# Patient Record
Sex: FEMALE | Race: WHITE | NOT HISPANIC OR LATINO
[De-identification: names, ages, dates, MRNs, and addresses within clinical notes are randomized per-mention and may not be internally consistent; named-entity substitution may affect disease eponyms.]

---

## 2018-05-22 ENCOUNTER — APPOINTMENT (OUTPATIENT)
Dept: INTERNAL MEDICINE | Facility: CLINIC | Age: 38
End: 2018-05-22

## 2018-09-26 ENCOUNTER — RESULT REVIEW (OUTPATIENT)
Age: 38
End: 2018-09-26

## 2018-10-03 ENCOUNTER — LABORATORY RESULT (OUTPATIENT)
Age: 38
End: 2018-10-03

## 2018-10-03 ENCOUNTER — APPOINTMENT (OUTPATIENT)
Dept: INTERNAL MEDICINE | Facility: CLINIC | Age: 38
End: 2018-10-03
Payer: MEDICAID

## 2018-10-03 VITALS
WEIGHT: 153 LBS | HEART RATE: 84 BPM | HEIGHT: 68 IN | BODY MASS INDEX: 23.19 KG/M2 | SYSTOLIC BLOOD PRESSURE: 103 MMHG | DIASTOLIC BLOOD PRESSURE: 71 MMHG | OXYGEN SATURATION: 98 %

## 2018-10-03 DIAGNOSIS — D64.9 ANEMIA, UNSPECIFIED: ICD-10-CM

## 2018-10-03 DIAGNOSIS — F12.90 CANNABIS USE, UNSPECIFIED, UNCOMPLICATED: ICD-10-CM

## 2018-10-03 DIAGNOSIS — T78.40XA ALLERGY, UNSPECIFIED, INITIAL ENCOUNTER: ICD-10-CM

## 2018-10-03 DIAGNOSIS — Z80.7 FAMILY HISTORY OF OTHER MALIGNANT NEOPLASMS OF LYMPHOID, HEMATOPOIETIC AND RELATED TISSUES: ICD-10-CM

## 2018-10-03 DIAGNOSIS — Z87.440 PERSONAL HISTORY OF URINARY (TRACT) INFECTIONS: ICD-10-CM

## 2018-10-03 DIAGNOSIS — Z78.9 OTHER SPECIFIED HEALTH STATUS: ICD-10-CM

## 2018-10-03 PROCEDURE — 36415 COLL VENOUS BLD VENIPUNCTURE: CPT

## 2018-10-03 PROCEDURE — G0444 DEPRESSION SCREEN ANNUAL: CPT

## 2018-10-03 PROCEDURE — 99204 OFFICE O/P NEW MOD 45 MIN: CPT | Mod: 25

## 2018-10-07 LAB
ALBUMIN SERPL ELPH-MCNC: 4.4 G/DL
ALP BLD-CCNC: 57 U/L
ALT SERPL-CCNC: 7 U/L
ANA PAT FLD IF-IMP: ABNORMAL
ANA SER IF-ACNC: ABNORMAL
ANION GAP SERPL CALC-SCNC: 15 MMOL/L
APPEARANCE: CLEAR
AST SERPL-CCNC: 25 U/L
BASOPHILS # BLD AUTO: 0.03 K/UL
BASOPHILS NFR BLD AUTO: 0.5 %
BILIRUB SERPL-MCNC: 0.3 MG/DL
BILIRUBIN URINE: NEGATIVE
BLOOD URINE: NEGATIVE
BUN SERPL-MCNC: 15 MG/DL
C TRACH RRNA SPEC QL NAA+PROBE: NOT DETECTED
CALCIUM SERPL-MCNC: 10.1 MG/DL
CHLORIDE SERPL-SCNC: 100 MMOL/L
CHOLEST SERPL-MCNC: 197 MG/DL
CHOLEST/HDLC SERPL: 1.6 RATIO
CO2 SERPL-SCNC: 25 MMOL/L
COLOR: YELLOW
CREAT SERPL-MCNC: 1.09 MG/DL
CRP SERPL-MCNC: 0.1 MG/DL
EOSINOPHIL # BLD AUTO: 0.18 K/UL
EOSINOPHIL NFR BLD AUTO: 3.1 %
ERYTHROCYTE [SEDIMENTATION RATE] IN BLOOD BY WESTERGREN METHOD: 10 MM/HR
GLUCOSE QUALITATIVE U: NEGATIVE MG/DL
GLUCOSE SERPL-MCNC: 46 MG/DL
HBA1C MFR BLD HPLC: 5.2 %
HBV CORE IGG+IGM SER QL: NONREACTIVE
HBV SURFACE AB SER QL: REACTIVE
HBV SURFACE AG SER QL: NONREACTIVE
HCT VFR BLD CALC: 45.6 %
HCV AB SER QL: NONREACTIVE
HCV S/CO RATIO: 0.24 S/CO
HDLC SERPL-MCNC: 120 MG/DL
HEPATITIS A IGG ANTIBODY: REACTIVE
HGB BLD-MCNC: 14.4 G/DL
HIV1+2 AB SPEC QL IA.RAPID: NONREACTIVE
IMM GRANULOCYTES NFR BLD AUTO: 0.2 %
KETONES URINE: NEGATIVE
LDLC SERPL CALC-MCNC: 70 MG/DL
LEUKOCYTE ESTERASE URINE: ABNORMAL
LYMPHOCYTES # BLD AUTO: 2.49 K/UL
LYMPHOCYTES NFR BLD AUTO: 42.9 %
MAN DIFF?: NORMAL
MCHC RBC-ENTMCNC: 28.1 PG
MCHC RBC-ENTMCNC: 31.6 GM/DL
MCV RBC AUTO: 88.9 FL
MONOCYTES # BLD AUTO: 0.42 K/UL
MONOCYTES NFR BLD AUTO: 7.2 %
N GONORRHOEA RRNA SPEC QL NAA+PROBE: NOT DETECTED
NEUTROPHILS # BLD AUTO: 2.68 K/UL
NEUTROPHILS NFR BLD AUTO: 46.1 %
NITRITE URINE: NEGATIVE
PH URINE: 5.5
PLATELET # BLD AUTO: 304 K/UL
POTASSIUM SERPL-SCNC: 5 MMOL/L
PROT SERPL-MCNC: 8.2 G/DL
PROTEIN URINE: NEGATIVE MG/DL
RBC # BLD: 5.13 M/UL
RBC # FLD: 13.4 %
SODIUM SERPL-SCNC: 140 MMOL/L
SOURCE AMPLIFICATION: NORMAL
SPECIFIC GRAVITY URINE: 1.01
T PALLIDUM AB SER QL IA: NEGATIVE
TRIGL SERPL-MCNC: 37 MG/DL
TSH SERPL-ACNC: 1.42 UIU/ML
UROBILINOGEN URINE: NEGATIVE MG/DL
WBC # FLD AUTO: 5.81 K/UL

## 2018-10-26 ENCOUNTER — APPOINTMENT (OUTPATIENT)
Dept: RHEUMATOLOGY | Facility: CLINIC | Age: 38
End: 2018-10-26

## 2018-12-03 ENCOUNTER — APPOINTMENT (OUTPATIENT)
Dept: RHEUMATOLOGY | Facility: CLINIC | Age: 38
End: 2018-12-03
Payer: MEDICAID

## 2018-12-03 VITALS
DIASTOLIC BLOOD PRESSURE: 70 MMHG | OXYGEN SATURATION: 99 % | HEART RATE: 100 BPM | WEIGHT: 151 LBS | SYSTOLIC BLOOD PRESSURE: 104 MMHG | BODY MASS INDEX: 22.96 KG/M2

## 2018-12-03 PROCEDURE — 36415 COLL VENOUS BLD VENIPUNCTURE: CPT

## 2018-12-03 PROCEDURE — 99205 OFFICE O/P NEW HI 60 MIN: CPT | Mod: GC,25

## 2018-12-12 LAB
APTT IMM NP/PRE NP PPP: NORMAL SEC
APTT INV RATIO PPP: 29 SEC
B2 GLYCOPROT1 IGA SERPL IA-ACNC: <5 SAU
B2 GLYCOPROT1 IGG SER-ACNC: <5 SGU
B2 GLYCOPROT1 IGM SER-ACNC: 13.9 SMU
C3 SERPL-MCNC: 106 MG/DL
C4 SERPL-MCNC: 18 MG/DL
CARDIOLIPIN IGM SER-MCNC: 11.2 MPL
CARDIOLIPIN IGM SER-MCNC: <5 GPL
CONFIRM: 24 SEC
DEPRECATED CARDIOLIPIN IGA SER: <5 APL
DRVVT IMM 1:2 NP PPP: NORMAL
DRVVT SCREEN TO CONFIRM RATIO: 0.89 RATIO
DSDNA AB SER-ACNC: <12 IU/ML
ENA RNP AB SER IA-ACNC: 0.2 AL
ENA SCL70 IGG SER IA-ACNC: <0.2 AL
ENA SM AB SER IA-ACNC: <0.2 AL
ENA SS-A AB SER IA-ACNC: >8 AL
ENA SS-B AB SER IA-ACNC: >8 AL
NPP NORMAL POOLED PLASMA: NORMAL SEC
SCREEN DRVVT: 26.4 SEC
SILICA CLOTTING TIME INTERPRETATION: NORMAL
SILICA CLOTTING TIME S/C: 1.03 RATIO
THYROGLOB AB SERPL-ACNC: <20 IU/ML
THYROPEROXIDASE AB SERPL IA-ACNC: <10 IU/ML
TSH SERPL-ACNC: 1.16 UIU/ML

## 2018-12-31 DIAGNOSIS — F51.04 PSYCHOPHYSIOLOGIC INSOMNIA: ICD-10-CM

## 2019-01-11 ENCOUNTER — APPOINTMENT (OUTPATIENT)
Dept: SURGERY | Facility: CLINIC | Age: 39
End: 2019-01-11

## 2019-01-11 ENCOUNTER — OUTPATIENT (OUTPATIENT)
Dept: OUTPATIENT SERVICES | Facility: HOSPITAL | Age: 39
LOS: 1 days | End: 2019-01-11
Payer: MEDICAID

## 2019-01-11 VITALS
OXYGEN SATURATION: 98 % | SYSTOLIC BLOOD PRESSURE: 107 MMHG | TEMPERATURE: 99 F | DIASTOLIC BLOOD PRESSURE: 70 MMHG | WEIGHT: 156.53 LBS | RESPIRATION RATE: 16 BRPM | HEIGHT: 68 IN | HEART RATE: 78 BPM

## 2019-01-11 DIAGNOSIS — Z90.5 ACQUIRED ABSENCE OF KIDNEY: Chronic | ICD-10-CM

## 2019-01-11 DIAGNOSIS — Z90.49 ACQUIRED ABSENCE OF OTHER SPECIFIED PARTS OF DIGESTIVE TRACT: Chronic | ICD-10-CM

## 2019-01-11 DIAGNOSIS — Z98.89 OTHER SPECIFIED POSTPROCEDURAL STATES: Chronic | ICD-10-CM

## 2019-01-11 DIAGNOSIS — Z01.818 ENCOUNTER FOR OTHER PREPROCEDURAL EXAMINATION: ICD-10-CM

## 2019-01-11 DIAGNOSIS — N80.9 ENDOMETRIOSIS, UNSPECIFIED: ICD-10-CM

## 2019-01-11 DIAGNOSIS — Q63.9 CONGENITAL MALFORMATION OF KIDNEY, UNSPECIFIED: Chronic | ICD-10-CM

## 2019-01-11 DIAGNOSIS — K08.409 PARTIAL LOSS OF TEETH, UNSPECIFIED CAUSE, UNSPECIFIED CLASS: Chronic | ICD-10-CM

## 2019-01-11 LAB
ALBUMIN SERPL ELPH-MCNC: 4.3 G/DL — SIGNIFICANT CHANGE UP (ref 3.3–5)
ALP SERPL-CCNC: 54 U/L — SIGNIFICANT CHANGE UP (ref 40–120)
ALT FLD-CCNC: 11 U/L — SIGNIFICANT CHANGE UP (ref 10–45)
ANION GAP SERPL CALC-SCNC: 13 MMOL/L — SIGNIFICANT CHANGE UP (ref 5–17)
APPEARANCE UR: CLEAR — SIGNIFICANT CHANGE UP
APTT BLD: 28.6 SEC — SIGNIFICANT CHANGE UP (ref 27.5–36.3)
AST SERPL-CCNC: 18 U/L — SIGNIFICANT CHANGE UP (ref 10–40)
BACTERIA # UR AUTO: PRESENT /HPF
BILIRUB SERPL-MCNC: 0.2 MG/DL — SIGNIFICANT CHANGE UP (ref 0.2–1.2)
BILIRUB UR-MCNC: NEGATIVE — SIGNIFICANT CHANGE UP
BLD GP AB SCN SERPL QL: NEGATIVE — SIGNIFICANT CHANGE UP
BUN SERPL-MCNC: 13 MG/DL — SIGNIFICANT CHANGE UP (ref 7–23)
CALCIUM SERPL-MCNC: 9.5 MG/DL — SIGNIFICANT CHANGE UP (ref 8.4–10.5)
CHLORIDE SERPL-SCNC: 101 MMOL/L — SIGNIFICANT CHANGE UP (ref 96–108)
CO2 SERPL-SCNC: 26 MMOL/L — SIGNIFICANT CHANGE UP (ref 22–31)
COLOR SPEC: YELLOW — SIGNIFICANT CHANGE UP
CREAT SERPL-MCNC: 1.07 MG/DL — SIGNIFICANT CHANGE UP (ref 0.5–1.3)
DIFF PNL FLD: ABNORMAL
EPI CELLS # UR: ABNORMAL /HPF (ref 0–5)
GLUCOSE SERPL-MCNC: 74 MG/DL — SIGNIFICANT CHANGE UP (ref 70–99)
GLUCOSE UR QL: NEGATIVE — SIGNIFICANT CHANGE UP
HCG SERPL-ACNC: <.1 MIU/ML — SIGNIFICANT CHANGE UP
HCT VFR BLD CALC: 42.3 % — SIGNIFICANT CHANGE UP (ref 34.5–45)
HGB BLD-MCNC: 13.9 G/DL — SIGNIFICANT CHANGE UP (ref 11.5–15.5)
INR BLD: 0.91 — SIGNIFICANT CHANGE UP (ref 0.88–1.16)
KETONES UR-MCNC: NEGATIVE — SIGNIFICANT CHANGE UP
LEUKOCYTE ESTERASE UR-ACNC: NEGATIVE — SIGNIFICANT CHANGE UP
MCHC RBC-ENTMCNC: 27.1 PG — SIGNIFICANT CHANGE UP (ref 27–34)
MCHC RBC-ENTMCNC: 32.9 G/DL — SIGNIFICANT CHANGE UP (ref 32–36)
MCV RBC AUTO: 82.6 FL — SIGNIFICANT CHANGE UP (ref 80–100)
NITRITE UR-MCNC: NEGATIVE — SIGNIFICANT CHANGE UP
PH UR: 6 — SIGNIFICANT CHANGE UP (ref 5–8)
PLATELET # BLD AUTO: 305 K/UL — SIGNIFICANT CHANGE UP (ref 150–400)
POTASSIUM SERPL-MCNC: 5 MMOL/L — SIGNIFICANT CHANGE UP (ref 3.5–5.3)
POTASSIUM SERPL-SCNC: 5 MMOL/L — SIGNIFICANT CHANGE UP (ref 3.5–5.3)
PROT SERPL-MCNC: 8.2 G/DL — SIGNIFICANT CHANGE UP (ref 6–8.3)
PROT UR-MCNC: NEGATIVE MG/DL — SIGNIFICANT CHANGE UP
PROTHROM AB SERPL-ACNC: 10.2 SEC — SIGNIFICANT CHANGE UP (ref 10–12.9)
RBC # BLD: 5.12 M/UL — SIGNIFICANT CHANGE UP (ref 3.8–5.2)
RBC # FLD: 12.6 % — SIGNIFICANT CHANGE UP (ref 10.3–16.9)
RBC CASTS # UR COMP ASSIST: ABNORMAL /HPF
RH IG SCN BLD-IMP: POSITIVE — SIGNIFICANT CHANGE UP
SODIUM SERPL-SCNC: 140 MMOL/L — SIGNIFICANT CHANGE UP (ref 135–145)
SP GR SPEC: 1.02 — SIGNIFICANT CHANGE UP (ref 1–1.03)
UROBILINOGEN FLD QL: 0.2 E.U./DL — SIGNIFICANT CHANGE UP
WBC # BLD: 6.8 K/UL — SIGNIFICANT CHANGE UP (ref 3.8–10.5)
WBC # FLD AUTO: 6.8 K/UL — SIGNIFICANT CHANGE UP (ref 3.8–10.5)
WBC UR QL: < 5 /HPF — SIGNIFICANT CHANGE UP

## 2019-01-11 PROCEDURE — 93010 ELECTROCARDIOGRAM REPORT: CPT

## 2019-01-11 NOTE — H&P PST ADULT - FAMILY HISTORY
Father  Still living? Yes, Estimated age: 71-80  Family history of hypertension, Age at diagnosis: Age Unknown

## 2019-01-11 NOTE — H&P PST ADULT - NSANTHOSAYNRD_GEN_A_CORE
No. DOMO screening performed.  STOP BANG Legend: 0-2 = LOW Risk; 3-4 = INTERMEDIATE Risk; 5-8 = HIGH Risk

## 2019-01-11 NOTE — ASU PATIENT PROFILE, ADULT - PSH
Congenital renal anomaly  Right kidney corrective procedure (?), ureteral stent placment 2008.  History of arthroscopy of shoulder  Right Labrum Tear Repair-2012  History of nephrectomy, unilateral  right kidney 2010  S/P laparoscopic procedure  for endometriosis affecting bladder/colon/with ovary adhesion to uterus  Status post appendectomy    Status post colon resection    [place] teeth extracted

## 2019-01-11 NOTE — H&P PST ADULT - HISTORY OF PRESENT ILLNESS
38 year old female with history of excessive pain over three years. ultrasound and mri confirmed endometriosis. present for history and physical examination prior to laparoscopic excision of endometriosis. 38 year old female with history of excessive lower abdominal pain over three years. Present for history and physical examination prior to laparoscopic excision of endometriosis. Patient had history of right kidney disease and surgically removed  right kidney 2010, now she remain with only left kidney.

## 2019-01-11 NOTE — H&P PST ADULT - PROBLEM SELECTOR PLAN 1
pending ekg and lab pending ekg Preop testing results reviewed. No further testings/consults requested for the proposed surgery.

## 2019-01-11 NOTE — H&P PST ADULT - PSH
Congenital renal anomaly  Right kidney corrective procedure (?), ureteral stent placment 2008.  History of arthroscopy of shoulder  Right Labrum Tear Repair-2012  History of nephrectomy, unilateral  right kidney 2010  S/P laparoscopic procedure  for endometriosis affecting bladder/colon/with ovary adhesion to uterus  Status post appendectomy    Status post colon resection    [place] teeth extracted

## 2019-01-11 NOTE — ASU PATIENT PROFILE, ADULT - PMH
Amenorrhea    Anxiety    Anxiety associated with depression    Congenital kidney disease  right kidney malformation.  Endometriosis    Febrile neutropenia  ER admission 12/05/16, had follow up with primary care MD.  Insomnia    Migraines    Panic disorder    UPJ stricture, acquired

## 2019-01-12 LAB
CANCER AG125 SERPL-ACNC: 22 U/ML — SIGNIFICANT CHANGE UP
CULTURE RESULTS: NO GROWTH — SIGNIFICANT CHANGE UP
SPECIMEN SOURCE: SIGNIFICANT CHANGE UP

## 2019-01-16 PROBLEM — F41.0 PANIC DISORDER [EPISODIC PAROXYSMAL ANXIETY]: Chronic | Status: ACTIVE | Noted: 2019-01-11

## 2019-01-16 PROBLEM — G47.00 INSOMNIA, UNSPECIFIED: Chronic | Status: ACTIVE | Noted: 2019-01-11

## 2019-01-16 PROBLEM — F41.9 ANXIETY DISORDER, UNSPECIFIED: Chronic | Status: ACTIVE | Noted: 2019-01-11

## 2019-01-17 LAB — ANTI-MULLERIAN HORMONE: 2.42 NG/ML — SIGNIFICANT CHANGE UP

## 2019-01-24 NOTE — ASU PATIENT PROFILE, ADULT - PMH
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  Insomnia    Migraines    Panic disorder    UPJ stricture, acquired

## 2019-01-24 NOTE — ASU PATIENT PROFILE, ADULT - PSH
Congenital renal anomaly  Right kidney corrective procedure (?), ureteral stent placment 2008.  History of appendectomy    History of arthroscopy of shoulder  Right Labrum Tear Repair-2012  History of nephrectomy, unilateral  right kidney 2010  S/P laparoscopic procedure  for endometriosis affecting bladder/colon/with ovary adhesion to uterus  Status post appendectomy    Status post colon resection    [place] teeth extracted

## 2019-01-24 NOTE — ASU PATIENT PROFILE, ADULT - SURGICAL SITE INCISION
Patient would like communication of their results via:        Cell Phone:   Telephone Information:   Mobile [phone]     Okay to leave a message containing results? Yes      no

## 2019-01-25 ENCOUNTER — INPATIENT (INPATIENT)
Facility: HOSPITAL | Age: 39
LOS: 0 days | Discharge: HOME | DRG: 331 | End: 2019-01-26
Attending: OBSTETRICS & GYNECOLOGY | Admitting: OBSTETRICS & GYNECOLOGY
Payer: COMMERCIAL

## 2019-01-25 ENCOUNTER — RESULT REVIEW (OUTPATIENT)
Age: 39
End: 2019-01-25

## 2019-01-25 VITALS
HEART RATE: 79 BPM | SYSTOLIC BLOOD PRESSURE: 110 MMHG | TEMPERATURE: 97 F | DIASTOLIC BLOOD PRESSURE: 59 MMHG | WEIGHT: 153 LBS | HEIGHT: 68 IN | RESPIRATION RATE: 16 BRPM

## 2019-01-25 DIAGNOSIS — N80.3 ENDOMETRIOSIS OF PELVIC PERITONEUM: ICD-10-CM

## 2019-01-25 DIAGNOSIS — Z98.89 OTHER SPECIFIED POSTPROCEDURAL STATES: Chronic | ICD-10-CM

## 2019-01-25 DIAGNOSIS — F41.0 PANIC DISORDER [EPISODIC PAROXYSMAL ANXIETY]: ICD-10-CM

## 2019-01-25 DIAGNOSIS — Z90.5 ACQUIRED ABSENCE OF KIDNEY: ICD-10-CM

## 2019-01-25 DIAGNOSIS — Z90.5 ACQUIRED ABSENCE OF KIDNEY: Chronic | ICD-10-CM

## 2019-01-25 DIAGNOSIS — K08.409 PARTIAL LOSS OF TEETH, UNSPECIFIED CAUSE, UNSPECIFIED CLASS: Chronic | ICD-10-CM

## 2019-01-25 DIAGNOSIS — Z90.49 ACQUIRED ABSENCE OF OTHER SPECIFIED PARTS OF DIGESTIVE TRACT: Chronic | ICD-10-CM

## 2019-01-25 DIAGNOSIS — F41.8 OTHER SPECIFIED ANXIETY DISORDERS: ICD-10-CM

## 2019-01-25 DIAGNOSIS — N80.8 OTHER ENDOMETRIOSIS: ICD-10-CM

## 2019-01-25 DIAGNOSIS — N80.5 ENDOMETRIOSIS OF INTESTINE: ICD-10-CM

## 2019-01-25 DIAGNOSIS — G47.00 INSOMNIA, UNSPECIFIED: ICD-10-CM

## 2019-01-25 DIAGNOSIS — Z88.0 ALLERGY STATUS TO PENICILLIN: ICD-10-CM

## 2019-01-25 DIAGNOSIS — Q63.9 CONGENITAL MALFORMATION OF KIDNEY, UNSPECIFIED: Chronic | ICD-10-CM

## 2019-01-25 RX ORDER — METOCLOPRAMIDE HCL 10 MG
10 TABLET ORAL EVERY 6 HOURS
Qty: 0 | Refills: 0 | Status: DISCONTINUED | OUTPATIENT
Start: 2019-01-25 | End: 2019-01-26

## 2019-01-25 RX ORDER — OXYCODONE HYDROCHLORIDE 5 MG/1
10 TABLET ORAL EVERY 6 HOURS
Qty: 0 | Refills: 0 | Status: DISCONTINUED | OUTPATIENT
Start: 2019-01-25 | End: 2019-01-26

## 2019-01-25 RX ORDER — PHENAZOPYRIDINE HCL 100 MG
100 TABLET ORAL EVERY 8 HOURS
Qty: 0 | Refills: 0 | Status: DISCONTINUED | OUTPATIENT
Start: 2019-01-25 | End: 2019-01-26

## 2019-01-25 RX ORDER — OXYCODONE HYDROCHLORIDE 5 MG/1
5 TABLET ORAL EVERY 4 HOURS
Qty: 0 | Refills: 0 | Status: DISCONTINUED | OUTPATIENT
Start: 2019-01-25 | End: 2019-01-26

## 2019-01-25 RX ORDER — MORPHINE SULFATE 50 MG/1
4 CAPSULE, EXTENDED RELEASE ORAL
Qty: 0 | Refills: 0 | Status: DISCONTINUED | OUTPATIENT
Start: 2019-01-25 | End: 2019-01-25

## 2019-01-25 RX ORDER — SODIUM CHLORIDE 9 MG/ML
1000 INJECTION, SOLUTION INTRAVENOUS
Qty: 0 | Refills: 0 | Status: DISCONTINUED | OUTPATIENT
Start: 2019-01-25 | End: 2019-01-26

## 2019-01-25 RX ORDER — ALPRAZOLAM 0.25 MG
0.5 TABLET ORAL AT BEDTIME
Qty: 0 | Refills: 0 | Status: DISCONTINUED | OUTPATIENT
Start: 2019-01-25 | End: 2019-01-26

## 2019-01-25 RX ORDER — ONDANSETRON 8 MG/1
4 TABLET, FILM COATED ORAL EVERY 4 HOURS
Qty: 0 | Refills: 0 | Status: DISCONTINUED | OUTPATIENT
Start: 2019-01-25 | End: 2019-01-26

## 2019-01-25 RX ORDER — KETOROLAC TROMETHAMINE 30 MG/ML
30 SYRINGE (ML) INJECTION EVERY 6 HOURS
Qty: 0 | Refills: 0 | Status: DISCONTINUED | OUTPATIENT
Start: 2019-01-25 | End: 2019-01-26

## 2019-01-25 RX ORDER — ACETAMINOPHEN 500 MG
975 TABLET ORAL EVERY 8 HOURS
Qty: 0 | Refills: 0 | Status: DISCONTINUED | OUTPATIENT
Start: 2019-01-25 | End: 2019-01-26

## 2019-01-25 RX ADMIN — ONDANSETRON 4 MILLIGRAM(S): 8 TABLET, FILM COATED ORAL at 17:11

## 2019-01-25 RX ADMIN — MORPHINE SULFATE 4 MILLIGRAM(S): 50 CAPSULE, EXTENDED RELEASE ORAL at 17:11

## 2019-01-25 RX ADMIN — Medication 0.5 MILLIGRAM(S): at 22:16

## 2019-01-25 RX ADMIN — ONDANSETRON 4 MILLIGRAM(S): 8 TABLET, FILM COATED ORAL at 22:15

## 2019-01-25 RX ADMIN — Medication 975 MILLIGRAM(S): at 23:49

## 2019-01-25 RX ADMIN — MORPHINE SULFATE 4 MILLIGRAM(S): 50 CAPSULE, EXTENDED RELEASE ORAL at 23:50

## 2019-01-25 RX ADMIN — Medication 30 MILLIGRAM(S): at 23:36

## 2019-01-25 RX ADMIN — Medication 975 MILLIGRAM(S): at 20:02

## 2019-01-25 RX ADMIN — Medication 30 MILLIGRAM(S): at 22:15

## 2019-01-25 RX ADMIN — SODIUM CHLORIDE 125 MILLILITER(S): 9 INJECTION, SOLUTION INTRAVENOUS at 19:27

## 2019-01-25 RX ADMIN — MORPHINE SULFATE 4 MILLIGRAM(S): 50 CAPSULE, EXTENDED RELEASE ORAL at 19:27

## 2019-01-25 RX ADMIN — MORPHINE SULFATE 4 MILLIGRAM(S): 50 CAPSULE, EXTENDED RELEASE ORAL at 20:22

## 2019-01-25 NOTE — H&P ADULT - PSH
Congenital renal anomaly  Right kidney corrective procedure (?), ureteral stent placment 2008.  History of appendectomy    History of arthroscopy of shoulder  Right Labrum Tear Repair-2012  History of nephrectomy, unilateral  right kidney 2010  S/P laparoscopic procedure  for endometriosis affecting bladder/colon/with ovary adhesion to uterus  Status post appendectomy    Status post colon resection    [place] teeth extracted

## 2019-01-25 NOTE — BRIEF OPERATIVE NOTE - PROCEDURE
<<-----Click on this checkbox to enter Procedure Repair of ileum  01/25/2019  resection of nodule on ileum and repair of ileum  Active  CZOTTOLA  Repair of colon  01/25/2019  distal sigmoid nodule resection and repair of colon  Active  CZOTTOLA  Excision of endometriosis of cul-de-sac  01/25/2019    Active  CZOTTOLA  Cystoscopy in female patient  01/25/2019    Active  CZOTTOLA  Resection of right ureter  01/25/2019    Active  CZOTTOLA  Hysteroscopy with dilation and curettage of uterus  01/25/2019    Active  CZOTTOLA  Resection of endometriosis  01/25/2019  Laparoscopic  Active  Sebas Peace

## 2019-01-25 NOTE — PROGRESS NOTE ADULT - SUBJECTIVE AND OBJECTIVE BOX
Team 1 Surgery Post-Op Note, PCN:     Pre-Op Dx: endometriosis  Procedure: Hysteroscopy with dilation and curettage of uterus  Resection of right ureter  Cystoscopy in female patient  Excision of endometriosis of cul-de-sac  Repair of colon  Repair of ileum  Resection of endometriosis    Surgeon: Berry Alexandre Krishnan    Subjective: Postoperatively, she feels well; her pain is well-controlled. No headache, chest pain, dyspnea, nausea, vomiting, calf pain. Complains of mild Herman pain. Has not passed flatus, had a BM or been out of bed.      Vital Signs Last 24 Hrs  T(C): 36.3 (25 Jan 2019 16:25), Max: 36.3 (25 Jan 2019 10:44)  T(F): 97.4 (25 Jan 2019 16:25), Max: 97.4 (25 Jan 2019 10:44)  HR: 66 (25 Jan 2019 17:48) (64 - 79)  BP: 110/65 (25 Jan 2019 17:48) (100/54 - 110/65)  BP(mean): 85 (25 Jan 2019 17:48) (71 - 85)  RR: 25 (25 Jan 2019 17:48) (9 - 25)  SpO2: 100% (25 Jan 2019 17:48) (100% - 100%)    Physical Exam:  General: NAD, resting comfortably in bed  Pulmonary: Nonlabored breathing, no respiratory distress  Abdominal: soft, mild RLQ and LLQ tenderness, nondistended, incisions CDI with Steri-Strips and gauzein place, no monik-incisional erythema or induration  Extremities: WWP, normal strength, no calf tenderness, SCDs in place  Neuro: no focal deficits  Psych: pleasant      LABS:            CAPILLARY BLOOD GLUCOSE            ABO Interpretation: A (01-25 @ 12:27)

## 2019-01-25 NOTE — PROGRESS NOTE ADULT - ASSESSMENT
38y Female POD# 0   s/p L/s endo excision, hysteroscopy D&C, cystoscopy s/p b/l stents, ileum nodule removal and repair, stable and meeting postop milestones appropriately.                                        1. Neuro/Pain:  toradol atc, tylenol atc, oxy prn  2  CV:   VS per routine  3. Pulm: Encourage ISS  4. GI: reg  5. :  Herman in place, TOV in AM with removal of ovarian suspension  6. Heme: AM CBC, BMP  7. ID: --  8. DVT ppx: SCDs  9. Dispo: per attending

## 2019-01-25 NOTE — H&P ADULT - ASSESSMENT
38y G0  with Last Menstrual Period 1/7/19 presenting for scheduled for laparoscopic surgery.  -consent obtained and questions answered  -likely to be admitted until post operative day #1

## 2019-01-25 NOTE — BRIEF OPERATIVE NOTE - OPERATION/FINDINGS
Preop Dx: Endometriosis  Postop Dx: Same as above  Procedure: Laparoscopic Resection of Endometriosis  Findings: Implants noted on distal ileum and rectum. These were excised and the resulting defects closed primarily. The terminal ileum defect closed with 1x simple interrupted suture and the rectal defect closed with 3x interrupted sutures. Prior ileocolonic anastomosis free of disease. Small bowel run proximally and free of disease. The remaining ascending colon, transverse/descending/sigmoid colon free of implants. Diaphragm inspected and free of implants. Further operative details per gyn operative note. Preop Dx: Endometriosis  Postop Dx: Same as above  Procedure: Laparoscopic Resection of Endometriosis  Findings: Implants noted on distal ileum and rectum. These were excised and the resulting defects closed primarily. The terminal ileum defect closed with 1x simple interrupted suture and the rectal defect closed with 3x interrupted sutures. Methylene blue test negative for leak. Prior ileocolonic anastomosis free of disease. Small bowel run proximally and free of disease. The remaining ascending colon, transverse/descending/sigmoid colon free of implants. Diaphragm inspected and free of implants. Further operative details per gyn operative note. Preop Dx: Endometriosis  Postop Dx: Same as above  Procedure: Laparoscopic Resection of Endometriosis  Findings: Implants noted on distal ileum and rectum. These were excised and the resulting defects closed primarily. The terminal ileum defect closed with 1x simple interrupted suture and the rectal defect closed with 4x interrupted sutures. Methylene blue test negative for leak. Prior ileocolonic anastomosis free of disease. Small bowel run proximally and free of disease. The remaining ascending colon, transverse/descending/sigmoid colon free of implants. Diaphragm inspected and free of implants. Methylene blue test of sigmoid repair was good.  Further operative details per gyn operative note. Preop Dx: Endometriosis  Postop Dx: Same as above  Procedure: Laparoscopic Resection of Endometriosis  Findings: Implants noted on distal ileum and distal sigmoid colon.  These were excised and the resulting defects closed primarily. The terminal ileum defect closed with 1x simple interrupted suture and the sigmoid defect closed with 4x interrupted sutures. Methylene blue test negative for leak. Prior ileocolonic anastomosis free of disease. Small bowel run proximally and free of disease. The remaining ascending colon, transverse/descending/sigmoid colon free of implants. Diaphragm inspected and free of implants. Further operative details per gyn operative note.

## 2019-01-25 NOTE — PROGRESS NOTE ADULT - SUBJECTIVE AND OBJECTIVE BOX
GYN POC   Patient seen at bedside.  Pain controlled. Tolerating sips.  No OOB.  Herman in place.    Denies CP, palpitations, SOB, fever, chills, nausea, vomiting.    Vital Signs Last 24 Hrs  T(C): 36.5 (26 Jan 2019 00:57), Max: 36.6 (25 Jan 2019 19:18)  T(F): 97.7 (26 Jan 2019 00:57), Max: 97.9 (25 Jan 2019 19:18)  HR: 68 (26 Jan 2019 00:57) (54 - 79)  BP: 102/61 (26 Jan 2019 00:57) (100/54 - 110/65)  BP(mean): 76 (25 Jan 2019 19:18) (71 - 85)  RR: 18 (26 Jan 2019 00:57) (9 - 25)  SpO2: 99% (26 Jan 2019 00:57) (99% - 100%)    Physical Exam:  Gen: No Acute Distress  Pulm: Clear to auscultation bilaterally  GI: soft, appropriately tender, nondistended, +BS, no rebound, no guarding.  Dressings C/D/I, right ovarian suspension  : Herman in place  Ext: SCDs in place, wnl    I&O's Summary    25 Jan 2019 07:01  -  26 Jan 2019 03:16  --------------------------------------------------------  IN: 1845 mL / OUT: 1175 mL / NET: 670 mL      MEDICATIONS  (STANDING):  acetaminophen   Tablet .. 975 milliGRAM(s) Oral every 8 hours  ALPRAZolam 0.5 milliGRAM(s) Oral at bedtime  ketorolac   Injectable 30 milliGRAM(s) IV Push every 6 hours  phenazopyridine 100 milliGRAM(s) Oral every 8 hours  promethazine IVPB 25 milliGRAM(s) IV Intermittent once    MEDICATIONS  (PRN):  metoclopramide Injectable 10 milliGRAM(s) IV Push every 6 hours PRN Nausea and/or Vomiting  ondansetron Injectable 4 milliGRAM(s) IV Push every 4 hours PRN Postoperative Nausea and/or Vomiting  oxyCODONE    IR 5 milliGRAM(s) Oral every 4 hours PRN Moderate Pain (4 - 6)  oxyCODONE    IR 10 milliGRAM(s) Oral every 6 hours PRN Severe Pain (7 - 10)    Allergies    adhesives (Hives; Rash)  chlorhexidine topical (Rash)  Dilaudid (Unknown)  Lamictal (Other)  penicillin (Hives)  Steri strips:    big blood blisters (Other)    Intolerances

## 2019-01-25 NOTE — PROGRESS NOTE ADULT - ASSESSMENT
38F with prior endometrioma resection that included a right ileocolic resection (4/8/2016) here with recurrent pelvic pain, now s/p laparoscopic endometrioma resection (1/25). General surgery excised endometrioma from terminal ileum and rectum. Both resulting defects closed with interrupted sutures    Care per gyn primary.   Agree with regular diet.  Team 1C will continue to follow.  Case discussed with chief resident. 38F with prior endometrioma resection that included a right ileocolic resection (4/8/2016) here with recurrent pelvic pain, now s/p laparoscopic endometrioma resection (1/25). General surgery excised endometrioma from terminal ileum and rectum. Both resulting defects closed with interrupted sutures    Care per gyn primary.   Agree with regular diet.  Please encourage IS use.  Team 1C will continue to follow.  Case discussed with chief resident.

## 2019-01-25 NOTE — BRIEF OPERATIVE NOTE - PROCEDURE
<<-----Click on this checkbox to enter Procedure Resection of endometriosis  01/25/2019  Laparoscopic  Active  [id number]

## 2019-01-25 NOTE — BRIEF OPERATIVE NOTE - OPERATION/FINDINGS
Hysteroscopy and dilation and curettage. Cystoscopy and bilateral ureteral stent placement by Dr. Morris. Laparoscopic endometriosis excisions. Dr. Alexandre resected nodule from ileum and distal sigmoid. Repaired resection sites of ileum and sigmoid. Endometriosis excisions of vesicovaginal region. Oversew bladder wall at resection site. Right ureterolysis and removal of right ureter to pelvic brim. Removal of right ureter (hx of right nephrectomy) and right stent.  Removed left ureteral stent. Right ovarian suspension and butler catheter left in place.  EBL <20cc

## 2019-01-26 ENCOUNTER — TRANSCRIPTION ENCOUNTER (OUTPATIENT)
Age: 39
End: 2019-01-26

## 2019-01-26 VITALS
OXYGEN SATURATION: 99 % | HEART RATE: 71 BPM | DIASTOLIC BLOOD PRESSURE: 70 MMHG | SYSTOLIC BLOOD PRESSURE: 107 MMHG | RESPIRATION RATE: 16 BRPM | TEMPERATURE: 98 F

## 2019-01-26 LAB
ANION GAP SERPL CALC-SCNC: 8 MMOL/L — SIGNIFICANT CHANGE UP (ref 5–17)
BASOPHILS NFR BLD AUTO: 0.1 % — SIGNIFICANT CHANGE UP (ref 0–2)
BUN SERPL-MCNC: 14 MG/DL — SIGNIFICANT CHANGE UP (ref 7–23)
CALCIUM SERPL-MCNC: 8.8 MG/DL — SIGNIFICANT CHANGE UP (ref 8.4–10.5)
CHLORIDE SERPL-SCNC: 105 MMOL/L — SIGNIFICANT CHANGE UP (ref 96–108)
CO2 SERPL-SCNC: 23 MMOL/L — SIGNIFICANT CHANGE UP (ref 22–31)
CREAT SERPL-MCNC: 1.12 MG/DL — SIGNIFICANT CHANGE UP (ref 0.5–1.3)
EOSINOPHIL NFR BLD AUTO: 0 % — SIGNIFICANT CHANGE UP (ref 0–6)
GLUCOSE SERPL-MCNC: 150 MG/DL — HIGH (ref 70–99)
HCT VFR BLD CALC: 35.9 % — SIGNIFICANT CHANGE UP (ref 34.5–45)
HGB BLD-MCNC: 11.9 G/DL — SIGNIFICANT CHANGE UP (ref 11.5–15.5)
LYMPHOCYTES # BLD AUTO: 12.8 % — LOW (ref 13–44)
MCHC RBC-ENTMCNC: 26.4 PG — LOW (ref 27–34)
MCHC RBC-ENTMCNC: 33.1 G/DL — SIGNIFICANT CHANGE UP (ref 32–36)
MCV RBC AUTO: 79.8 FL — LOW (ref 80–100)
MONOCYTES NFR BLD AUTO: 7.5 % — SIGNIFICANT CHANGE UP (ref 2–14)
NEUTROPHILS NFR BLD AUTO: 79.6 % — HIGH (ref 43–77)
PLATELET # BLD AUTO: 269 K/UL — SIGNIFICANT CHANGE UP (ref 150–400)
POTASSIUM SERPL-MCNC: 4.2 MMOL/L — SIGNIFICANT CHANGE UP (ref 3.5–5.3)
POTASSIUM SERPL-SCNC: 4.2 MMOL/L — SIGNIFICANT CHANGE UP (ref 3.5–5.3)
RBC # BLD: 4.5 M/UL — SIGNIFICANT CHANGE UP (ref 3.8–5.2)
RBC # FLD: 12.4 % — SIGNIFICANT CHANGE UP (ref 10.3–16.9)
SODIUM SERPL-SCNC: 136 MMOL/L — SIGNIFICANT CHANGE UP (ref 135–145)
WBC # BLD: 10.7 K/UL — HIGH (ref 3.8–10.5)
WBC # FLD AUTO: 10.7 K/UL — HIGH (ref 3.8–10.5)

## 2019-01-26 RX ORDER — MIRTAZAPINE 45 MG/1
2 TABLET, ORALLY DISINTEGRATING ORAL
Qty: 0 | Refills: 0 | COMMUNITY

## 2019-01-26 RX ORDER — SIMETHICONE 80 MG/1
80 TABLET, CHEWABLE ORAL
Qty: 0 | Refills: 0 | Status: DISCONTINUED | OUTPATIENT
Start: 2019-01-26 | End: 2019-01-26

## 2019-01-26 RX ORDER — NARATRIPTAN HYDROCHLORIDE 1 MG/1
1 TABLET, FILM COATED ORAL
Qty: 0 | Refills: 0 | COMMUNITY

## 2019-01-26 RX ADMIN — Medication 975 MILLIGRAM(S): at 07:41

## 2019-01-26 RX ADMIN — Medication 975 MILLIGRAM(S): at 14:45

## 2019-01-26 RX ADMIN — SIMETHICONE 80 MILLIGRAM(S): 80 TABLET, CHEWABLE ORAL at 05:38

## 2019-01-26 RX ADMIN — Medication 100 MILLIGRAM(S): at 14:03

## 2019-01-26 RX ADMIN — Medication 100 MILLIGRAM(S): at 05:38

## 2019-01-26 RX ADMIN — OXYCODONE HYDROCHLORIDE 10 MILLIGRAM(S): 5 TABLET ORAL at 08:20

## 2019-01-26 RX ADMIN — Medication 30 MILLIGRAM(S): at 04:17

## 2019-01-26 RX ADMIN — OXYCODONE HYDROCHLORIDE 5 MILLIGRAM(S): 5 TABLET ORAL at 14:45

## 2019-01-26 RX ADMIN — Medication 975 MILLIGRAM(S): at 05:38

## 2019-01-26 RX ADMIN — Medication 100 MILLIGRAM(S): at 00:44

## 2019-01-26 RX ADMIN — ONDANSETRON 4 MILLIGRAM(S): 8 TABLET, FILM COATED ORAL at 08:02

## 2019-01-26 RX ADMIN — Medication 975 MILLIGRAM(S): at 14:03

## 2019-01-26 RX ADMIN — Medication 30 MILLIGRAM(S): at 09:30

## 2019-01-26 RX ADMIN — Medication 30 MILLIGRAM(S): at 15:20

## 2019-01-26 RX ADMIN — Medication 30 MILLIGRAM(S): at 09:15

## 2019-01-26 RX ADMIN — Medication 30 MILLIGRAM(S): at 15:09

## 2019-01-26 RX ADMIN — OXYCODONE HYDROCHLORIDE 10 MILLIGRAM(S): 5 TABLET ORAL at 07:58

## 2019-01-26 RX ADMIN — OXYCODONE HYDROCHLORIDE 5 MILLIGRAM(S): 5 TABLET ORAL at 14:03

## 2019-01-26 NOTE — PROGRESS NOTE ADULT - SUBJECTIVE AND OBJECTIVE BOX
GYN Progress Note    Patient seen at bedside.  Pain controlled on OPMs, toradol.   Tolerating regular diet. Has not passed flatus.   OOB  s/p butler, has not voided yet.     Denies CP, palpitations, SOB, fever, chills, nausea, vomiting.    Vital Signs Last 24 Hrs  T(C): 36.5 (26 Jan 2019 14:45), Max: 37.4 (26 Jan 2019 08:45)  T(F): 97.7 (26 Jan 2019 14:45), Max: 99.3 (26 Jan 2019 08:45)  HR: 71 (26 Jan 2019 14:45) (54 - 74)  BP: 107/70 (26 Jan 2019 14:45) (100/54 - 110/65)  BP(mean): 76 (25 Jan 2019 19:18) (71 - 85)  RR: 16 (26 Jan 2019 14:45) (9 - 25)  SpO2: 99% (26 Jan 2019 14:45) (99% - 100%)    Physical Exam:  Gen: No Acute Distress  Pulm: Normal work of breathing  GI: soft, appropriately tender, nondistended, +BS, no rebound, no guarding.  Incision C/D/I; ovarian suspensions removed  Ext: SCDs in place, wnl    I&O's Summary    25 Jan 2019 07:01  -  26 Jan 2019 07:00  --------------------------------------------------------  IN: 3145 mL / OUT: 2375 mL / NET: 770 mL    26 Jan 2019 07:01  -  26 Jan 2019 14:53  --------------------------------------------------------  IN: 600 mL / OUT: 200 mL / NET: 400 mL      MEDICATIONS  (STANDING):  acetaminophen   Tablet .. 975 milliGRAM(s) Oral every 8 hours  ALPRAZolam 0.5 milliGRAM(s) Oral at bedtime  ketorolac   Injectable 30 milliGRAM(s) IV Push every 6 hours  phenazopyridine 100 milliGRAM(s) Oral every 8 hours  promethazine IVPB 25 milliGRAM(s) IV Intermittent once    MEDICATIONS  (PRN):  metoclopramide Injectable 10 milliGRAM(s) IV Push every 6 hours PRN Nausea and/or Vomiting  ondansetron Injectable 4 milliGRAM(s) IV Push every 4 hours PRN Postoperative Nausea and/or Vomiting  oxyCODONE    IR 5 milliGRAM(s) Oral every 4 hours PRN Moderate Pain (4 - 6)  oxyCODONE    IR 10 milliGRAM(s) Oral every 6 hours PRN Severe Pain (7 - 10)  simethicone 80 milliGRAM(s) Chew two times a day PRN Gas      LABS:                        11.9   10.7  )-----------( 269      ( 26 Jan 2019 06:48 )             35.9     01-26    136  |  105  |  14  ----------------------------<  150<H>  4.2   |  23  |  1.12    Ca    8.8      26 Jan 2019 06:48

## 2019-01-26 NOTE — PROGRESS NOTE ADULT - REASON FOR ADMISSION
persistent pelvic pain; hx of endometriosis
scheduled laparoscopic surgery

## 2019-01-26 NOTE — PROGRESS NOTE ADULT - ASSESSMENT
POD#1 removal of bowel wall endometrial implants, doing well  1. advance diet as tolerated  2. OOB  3.  discharge planning as per Dr. Smart's team.

## 2019-01-26 NOTE — PROGRESS NOTE ADULT - ASSESSMENT
38y s/p hysteroscopy D&C, cystoscopy s/p b/l stents, laparoscopic endometriosis excision, ileum nodule removal and repair, right retroperitoneal dissection, R ureterectomy POD 1 stable    -ambulate once butler and suspensions removed  -reg diet  -pain control  -intraop findings and post-op instructions d/w patient  -leukocytosis - likely reactive; s/p intraop abx and afebrile  -discharge home today or tomorrow once passing flatus    I have personally seen and examined patient on the above date and sunshine Smart. We agree with findings and plan as detailed per resident note

## 2019-01-26 NOTE — PROGRESS NOTE ADULT - SUBJECTIVE AND OBJECTIVE BOX
Attending Surgeon Progress Note (Covering for Dr. Alexandre)    STATUS POST:  resection/ablation of endometriosis  POST OPERATIVE DAY #: 1    SUBJECTIVE: feels OK, pain controlled  Flatus: Y  Bowel movement: N     PHYSICAL EXAM:  Alert, oriented  Lungs:  CTA bilaterally, good inspiratory effort  Cor:  RRR  Abdomen:  soft, nondistended, incisional tenderness, +bowel sounds, dressings clean dry, intact  Ext:  no edema, well-perfused

## 2019-01-26 NOTE — DISCHARGE NOTE ADULT - PATIENT PORTAL LINK FT
You can access the [facility] Patient Portal, offered by [facility], by registering with the following website: [email or web address]

## 2019-01-26 NOTE — DISCHARGE NOTE ADULT - CARE PLAN
Principal Discharge DX:	Endometriosis  Goal:	healthy recovery  Assessment and plan of treatment:	Please follow up with Dr. Smart in 1-2 weeks. Call your doctor if you have severe, worsening pain, heavy vaginal bleeding, or fevers greater than 100.4 F. It was a pleasure taking care of you.

## 2019-01-26 NOTE — DISCHARGE NOTE ADULT - MEDICATION SUMMARY - MEDICATIONS TO TAKE
I will START or STAY ON the medications listed below when I get home from the hospital:    acetaminophen 325 mg oral tablet  -- 3 tab(s) by mouth every 8 hours  -- Indication: For Pain    Advil 200 mg oral tablet  -- 1 tab(s) by mouth every 6 hours  -- Indication: For Pain    levocetirizine 5 mg oral tablet  -- 1 tab(s) by mouth once a day (in the evening)  -- Indication: For Allergies    Xanax 0.5 mg oral tablet  -- orally once a day  -- Indication: For Anxiety

## 2019-01-26 NOTE — PROGRESS NOTE ADULT - ASSESSMENT
38y Female POD#1 s/p l/s endo excision, D&C, hysteroscoy, cystoscopy with b/l ureteral stents, right ureter resection, ileum nodule removal and repair, distal sigmoid nodule removal and repair, bladder serosal repair                                        1. Neuro/Pain:  OPM, xanax 0.5mg qhs  2  CV:   VS per routine  3. Pulm: Encourage ISS  4. GI: Reg  5. :  f/u TOV  6. Heme: Stable  7. ID: --  8. DVT ppx: SCDs  9. Dispo: Likely POD#1 or 2

## 2019-01-26 NOTE — DISCHARGE NOTE ADULT - PLAN OF CARE
healthy recovery Please follow up with Dr. Smart in 1-2 weeks. Call your doctor if you have severe, worsening pain, heavy vaginal bleeding, or fevers greater than 100.4 F. It was a pleasure taking care of you.

## 2019-01-26 NOTE — PROGRESS NOTE ADULT - SUBJECTIVE AND OBJECTIVE BOX
pain tolerable  wants to ambulate once suspensions, catheter out  tolerating liquids with no n/v  no flatus  no overnight events    VSS  labs reviewed    Abd soft, appropriately TTP, incisions cdi, mild distention

## 2019-01-26 NOTE — PROGRESS NOTE ADULT - ASSESSMENT
38y Female POD#1   s/p L/s endo excision, hysteroscopy D&C, cystoscopy s/p b/l stents, ileum nodule removal and repair, stable and meeting postop milestones appropriately.                                        1. Neuro/Pain:  toradol atc, tylenol atc, oxy prn  2  CV:   VS per routine  3. Pulm: Encourage ISS  4. GI: reg  5. :  Herman in place, TOV at 09:00 with removal of ovarian suspension  6. Heme: AM hemoglobin stable  7. ID: --  8. DVT ppx: SCDs  9. Dispo: per attending

## 2019-01-28 ENCOUNTER — APPOINTMENT (OUTPATIENT)
Dept: PULMONOLOGY | Facility: CLINIC | Age: 39
End: 2019-01-28

## 2019-02-01 LAB — SURGICAL PATHOLOGY STUDY: SIGNIFICANT CHANGE UP

## 2019-02-03 PROBLEM — T78.40XA ALLERGY: Status: ACTIVE | Noted: 2018-10-03

## 2019-02-27 ENCOUNTER — TRANSCRIPTION ENCOUNTER (OUTPATIENT)
Age: 39
End: 2019-02-27

## 2019-03-12 ENCOUNTER — APPOINTMENT (OUTPATIENT)
Dept: RHEUMATOLOGY | Facility: CLINIC | Age: 39
End: 2019-03-12
Payer: MEDICAID

## 2019-03-12 VITALS
OXYGEN SATURATION: 99 % | TEMPERATURE: 98.7 F | BODY MASS INDEX: 22.73 KG/M2 | DIASTOLIC BLOOD PRESSURE: 73 MMHG | WEIGHT: 150 LBS | HEART RATE: 87 BPM | SYSTOLIC BLOOD PRESSURE: 112 MMHG | HEIGHT: 68 IN

## 2019-03-12 PROCEDURE — 36415 COLL VENOUS BLD VENIPUNCTURE: CPT

## 2019-03-12 PROCEDURE — 99215 OFFICE O/P EST HI 40 MIN: CPT | Mod: 25

## 2019-03-13 LAB
ALBUMIN SERPL ELPH-MCNC: 4.1 G/DL
ALP BLD-CCNC: 49 U/L
ALT SERPL-CCNC: 11 U/L
ANION GAP SERPL CALC-SCNC: 13 MMOL/L
AST SERPL-CCNC: 15 U/L
BASOPHILS # BLD AUTO: 0.03 K/UL
BASOPHILS NFR BLD AUTO: 0.5 %
BILIRUB SERPL-MCNC: 0.2 MG/DL
BUN SERPL-MCNC: 10 MG/DL
C3 SERPL-MCNC: 111 MG/DL
C4 SERPL-MCNC: 21 MG/DL
CALCIUM SERPL-MCNC: 9.6 MG/DL
CHLORIDE SERPL-SCNC: 103 MMOL/L
CO2 SERPL-SCNC: 25 MMOL/L
CREAT SERPL-MCNC: 1.05 MG/DL
CRP SERPL-MCNC: <0.1 MG/DL
EOSINOPHIL # BLD AUTO: 0.17 K/UL
EOSINOPHIL NFR BLD AUTO: 2.6 %
ERYTHROCYTE [SEDIMENTATION RATE] IN BLOOD BY WESTERGREN METHOD: 7 MM/HR
GLUCOSE SERPL-MCNC: 85 MG/DL
HCT VFR BLD CALC: 45.1 %
HGB BLD-MCNC: 13.4 G/DL
IGA SER QL IEP: 358 MG/DL
IGG SER QL IEP: 1339 MG/DL
IGM SER QL IEP: 220 MG/DL
IMM GRANULOCYTES NFR BLD AUTO: 0.2 %
LYMPHOCYTES # BLD AUTO: 2.49 K/UL
LYMPHOCYTES NFR BLD AUTO: 38.1 %
MAN DIFF?: NORMAL
MCHC RBC-ENTMCNC: 26.9 PG
MCHC RBC-ENTMCNC: 29.7 GM/DL
MCV RBC AUTO: 90.6 FL
MONOCYTES # BLD AUTO: 0.47 K/UL
MONOCYTES NFR BLD AUTO: 7.2 %
NEUTROPHILS # BLD AUTO: 3.37 K/UL
NEUTROPHILS NFR BLD AUTO: 51.4 %
PLATELET # BLD AUTO: 310 K/UL
POTASSIUM SERPL-SCNC: 4.9 MMOL/L
PROT SERPL-MCNC: 7.2 G/DL
RBC # BLD: 4.98 M/UL
RBC # FLD: 13.2 %
SODIUM SERPL-SCNC: 141 MMOL/L
WBC # FLD AUTO: 6.54 K/UL

## 2019-03-13 NOTE — REASON FOR VISIT
CC:  Emelina Golden is here today for epigastric pain.    Medications: medications verified and updated  Refills needed today?  NO  denies Latex allergy or sensitivity  Patient would like communication of their results via:      Cell Phone:   Telephone Information:   Mobile [phone]     Okay to leave a message containing results? Yes  Health Maintenance   Topic Date Due   • Breast Cancer Screening  12/12/2018   • DTaP/Tdap/Td Vaccine (2 - Td) 01/07/2020   • Cervical Cancer Screening HPV CO-Testing  02/20/2022   • Influenza Vaccine  Completed        [Follow-Up: _____] : a [unfilled] follow-up visit

## 2019-03-13 NOTE — ASSESSMENT
[FreeTextEntry1] : 38 year old presents for follow up evaluation of MAX 1:640. SSa/SSb positive.\par Patient now with dry eyes, diagnosed by ophtho by schirmer test. \par She denies additional sicca symptoms but does note fatigue since her surgery. It is possible that surgery has led to the onset of her autoimmune condition. We discussed her options, including symptomatic treatment for sicca, evaluation with a lip biopsy and plaquenil. She chose to continue symptomatic treatment with eye drops for now. Will continue to monitor for additional symptoms, and consider Plaquenil should her fatigue persist. \par A lip biopsy would ultimately be very helpful to definitively diagnose her, but she is tried from recent surgery/doctors appointments and thus we will hold off for now.

## 2019-03-13 NOTE — REVIEW OF SYSTEMS
[As Noted in HPI] : as noted in HPI [Dizziness] : dizziness [Negative] : Integumentary [Fever] : no fever [Chills] : no chills [Dry Eyes] : no dryness of the eyes [Chest Pain] : no chest pain [Lower Ext Edema] : no lower extremity edema

## 2019-03-13 NOTE — HISTORY OF PRESENT ILLNESS
[___ Month(s) Ago] : [unfilled] month(s) ago [FreeTextEntry1] : Initial Visit: \par Patient seen and examined with Dr. Solorzano. \par Agree with history, physical exam, assessment and plan as described above with exception of the following additions and changes:\par 28 year old presents for evaluation of MAX 1:640. \par Patient with severe endometriosis which likely explains this particularly as she has no other symptoms or signs suggesting an autoimmune condition. \par Will send serologic work up today. \par

## 2019-03-13 NOTE — PHYSICAL EXAM
[General Appearance - Alert] : alert [General Appearance - In No Acute Distress] : in no acute distress [General Appearance - Well Nourished] : well nourished [General Appearance - Well Developed] : well developed [Extraocular Movements] : extraocular movements were intact [Oropharynx] : the oropharynx was normal [Respiration, Rhythm And Depth] : normal respiratory rhythm and effort [Auscultation Breath Sounds / Voice Sounds] : lungs were clear to auscultation bilaterally [Heart Rate And Rhythm] : heart rate was normal and rhythm regular [Heart Sounds] : normal S1 and S2 [Heart Sounds Gallop] : no gallops [Murmurs] : no murmurs [Heart Sounds Pericardial Friction Rub] : no pericardial rub [Edema] : there was no peripheral edema [Abdomen Soft] : soft [Abdomen Tenderness] : non-tender [No Spinal Tenderness] : no spinal tenderness [Abnormal Walk] : normal gait [Musculoskeletal - Swelling] : no joint swelling seen [Motor Tone] : muscle strength and tone were normal [] : no rash [Skin Lesions] : no skin lesions [Cranial Nerves] : cranial nerves 2-12 were intact [No Focal Deficits] : no focal deficits [Outer Ear] : the ears and nose were normal in appearance [Nasal Cavity] : the nasal mucosa and septum were normal [Neck Appearance] : the appearance of the neck was normal

## 2019-04-03 ENCOUNTER — TRANSCRIPTION ENCOUNTER (OUTPATIENT)
Age: 39
End: 2019-04-03

## 2019-04-03 ENCOUNTER — MEDICATION RENEWAL (OUTPATIENT)
Age: 39
End: 2019-04-03

## 2019-07-09 ENCOUNTER — TRANSCRIPTION ENCOUNTER (OUTPATIENT)
Age: 39
End: 2019-07-09

## 2019-07-09 DIAGNOSIS — M75.100 UNSPECIFIED ROTATOR CUFF TEAR OR RUPTURE OF UNSPECIFIED SHOULDER, NOT SPECIFIED AS TRAUMATIC: ICD-10-CM

## 2019-07-10 ENCOUNTER — TRANSCRIPTION ENCOUNTER (OUTPATIENT)
Age: 39
End: 2019-07-10

## 2019-07-10 PROBLEM — M75.100 ROTATOR CUFF TEAR: Status: ACTIVE | Noted: 2019-07-10

## 2019-07-10 PROCEDURE — 36415 COLL VENOUS BLD VENIPUNCTURE: CPT

## 2019-07-10 PROCEDURE — 86901 BLOOD TYPING SEROLOGIC RH(D): CPT

## 2019-07-10 PROCEDURE — 85025 COMPLETE CBC W/AUTO DIFF WBC: CPT

## 2019-07-10 PROCEDURE — 80048 BASIC METABOLIC PNL TOTAL CA: CPT

## 2019-07-10 PROCEDURE — 86900 BLOOD TYPING SEROLOGIC ABO: CPT

## 2019-07-10 PROCEDURE — 88305 TISSUE EXAM BY PATHOLOGIST: CPT

## 2019-07-10 PROCEDURE — 86850 RBC ANTIBODY SCREEN: CPT

## 2019-07-11 ENCOUNTER — TRANSCRIPTION ENCOUNTER (OUTPATIENT)
Age: 39
End: 2019-07-11

## 2019-07-29 ENCOUNTER — APPOINTMENT (OUTPATIENT)
Dept: INTERNAL MEDICINE | Facility: CLINIC | Age: 39
End: 2019-07-29

## 2019-10-10 ENCOUNTER — APPOINTMENT (OUTPATIENT)
Dept: INTERNAL MEDICINE | Facility: CLINIC | Age: 39
End: 2019-10-10
Payer: MEDICAID

## 2019-10-10 ENCOUNTER — NON-APPOINTMENT (OUTPATIENT)
Age: 39
End: 2019-10-10

## 2019-10-10 VITALS
HEART RATE: 87 BPM | SYSTOLIC BLOOD PRESSURE: 103 MMHG | DIASTOLIC BLOOD PRESSURE: 69 MMHG | HEIGHT: 68 IN | BODY MASS INDEX: 23.04 KG/M2 | TEMPERATURE: 98.2 F | OXYGEN SATURATION: 98 % | WEIGHT: 152 LBS

## 2019-10-10 DIAGNOSIS — Z11.3 ENCOUNTER FOR SCREENING FOR INFECTIONS WITH A PREDOMINANTLY SEXUAL MODE OF TRANSMISSION: ICD-10-CM

## 2019-10-10 PROCEDURE — 99214 OFFICE O/P EST MOD 30 MIN: CPT | Mod: 25

## 2019-10-10 PROCEDURE — 93000 ELECTROCARDIOGRAM COMPLETE: CPT

## 2019-10-10 PROCEDURE — 36415 COLL VENOUS BLD VENIPUNCTURE: CPT

## 2019-10-10 RX ORDER — NITROFURANTOIN (MONOHYDRATE/MACROCRYSTALS) 25; 75 MG/1; MG/1
100 CAPSULE ORAL
Qty: 10 | Refills: 0 | Status: DISCONTINUED | COMMUNITY
Start: 2018-10-03 | End: 2019-10-10

## 2019-10-10 RX ORDER — MIRTAZAPINE 7.5 MG/1
7.5 TABLET, FILM COATED ORAL
Qty: 90 | Refills: 0 | Status: DISCONTINUED | COMMUNITY
Start: 2018-12-31 | End: 2019-10-10

## 2019-10-11 ENCOUNTER — TRANSCRIPTION ENCOUNTER (OUTPATIENT)
Age: 39
End: 2019-10-11

## 2019-10-11 LAB
ALBUMIN SERPL ELPH-MCNC: 4.4 G/DL
ALP BLD-CCNC: 49 U/L
ALT SERPL-CCNC: 10 U/L
ANION GAP SERPL CALC-SCNC: 14 MMOL/L
APPEARANCE: CLEAR
APTT BLD: 27.8 SEC
AST SERPL-CCNC: 16 U/L
BASOPHILS # BLD AUTO: 0.05 K/UL
BASOPHILS NFR BLD AUTO: 0.6 %
BILIRUB SERPL-MCNC: 0.4 MG/DL
BILIRUBIN URINE: NEGATIVE
BLOOD URINE: NEGATIVE
BUN SERPL-MCNC: 16 MG/DL
CALCIUM SERPL-MCNC: 9.5 MG/DL
CHLORIDE SERPL-SCNC: 101 MMOL/L
CO2 SERPL-SCNC: 23 MMOL/L
COLOR: NORMAL
CREAT SERPL-MCNC: 0.93 MG/DL
EOSINOPHIL # BLD AUTO: 0.36 K/UL
EOSINOPHIL NFR BLD AUTO: 4 %
GLUCOSE QUALITATIVE U: NEGATIVE
GLUCOSE SERPL-MCNC: 86 MG/DL
HCG SERPL-MCNC: <1 MIU/ML
HCT VFR BLD CALC: 44.3 %
HCV AB SER QL: NONREACTIVE
HCV S/CO RATIO: 0.23 S/CO
HGB BLD-MCNC: 13.9 G/DL
HIV1+2 AB SPEC QL IA.RAPID: NONREACTIVE
IMM GRANULOCYTES NFR BLD AUTO: 0.2 %
INR PPP: 0.99 RATIO
KETONES URINE: NEGATIVE
LEUKOCYTE ESTERASE URINE: NEGATIVE
LYMPHOCYTES # BLD AUTO: 2.16 K/UL
LYMPHOCYTES NFR BLD AUTO: 23.8 %
MAN DIFF?: NORMAL
MCHC RBC-ENTMCNC: 27.9 PG
MCHC RBC-ENTMCNC: 31.4 GM/DL
MCV RBC AUTO: 88.8 FL
MONOCYTES # BLD AUTO: 0.57 K/UL
MONOCYTES NFR BLD AUTO: 6.3 %
NEUTROPHILS # BLD AUTO: 5.9 K/UL
NEUTROPHILS NFR BLD AUTO: 65.1 %
NITRITE URINE: NEGATIVE
PH URINE: 7.5
PLATELET # BLD AUTO: 241 K/UL
POTASSIUM SERPL-SCNC: 4.8 MMOL/L
PROT SERPL-MCNC: 7.4 G/DL
PROTEIN URINE: NEGATIVE
PT BLD: 11.3 SEC
RBC # BLD: 4.99 M/UL
RBC # FLD: 13 %
SODIUM SERPL-SCNC: 138 MMOL/L
SPECIFIC GRAVITY URINE: 1.01
T PALLIDUM AB SER QL IA: NEGATIVE
UROBILINOGEN URINE: NORMAL
WBC # FLD AUTO: 9.06 K/UL

## 2019-10-19 ENCOUNTER — TRANSCRIPTION ENCOUNTER (OUTPATIENT)
Age: 39
End: 2019-10-19

## 2019-10-21 ENCOUNTER — OUTPATIENT (OUTPATIENT)
Dept: OUTPATIENT SERVICES | Facility: HOSPITAL | Age: 39
LOS: 1 days | Discharge: HOME | End: 2019-10-21

## 2019-10-21 ENCOUNTER — RESULT REVIEW (OUTPATIENT)
Age: 39
End: 2019-10-21

## 2019-10-21 DIAGNOSIS — Z90.49 ACQUIRED ABSENCE OF OTHER SPECIFIED PARTS OF DIGESTIVE TRACT: Chronic | ICD-10-CM

## 2019-10-21 DIAGNOSIS — Z98.89 OTHER SPECIFIED POSTPROCEDURAL STATES: Chronic | ICD-10-CM

## 2019-10-21 DIAGNOSIS — Q63.9 CONGENITAL MALFORMATION OF KIDNEY, UNSPECIFIED: Chronic | ICD-10-CM

## 2019-10-21 DIAGNOSIS — Z90.5 ACQUIRED ABSENCE OF KIDNEY: Chronic | ICD-10-CM

## 2019-10-21 DIAGNOSIS — K08.409 PARTIAL LOSS OF TEETH, UNSPECIFIED CAUSE, UNSPECIFIED CLASS: Chronic | ICD-10-CM

## 2019-10-22 ENCOUNTER — TRANSCRIPTION ENCOUNTER (OUTPATIENT)
Age: 39
End: 2019-10-22

## 2019-10-22 LAB — SURGICAL PATHOLOGY STUDY: SIGNIFICANT CHANGE UP

## 2019-10-24 ENCOUNTER — OTHER (OUTPATIENT)
Age: 39
End: 2019-10-24

## 2019-10-24 ENCOUNTER — TRANSCRIPTION ENCOUNTER (OUTPATIENT)
Age: 39
End: 2019-10-24

## 2019-10-25 ENCOUNTER — OTHER (OUTPATIENT)
Age: 39
End: 2019-10-25

## 2019-10-28 ENCOUNTER — APPOINTMENT (OUTPATIENT)
Dept: INTERNAL MEDICINE | Facility: CLINIC | Age: 39
End: 2019-10-28
Payer: MEDICAID

## 2019-10-28 VITALS — HEIGHT: 68 IN | BODY MASS INDEX: 23.04 KG/M2 | TEMPERATURE: 97.3 F | WEIGHT: 152 LBS | OXYGEN SATURATION: 98 %

## 2019-10-28 DIAGNOSIS — I80.9 PHLEBITIS AND THROMBOPHLEBITIS OF UNSPECIFIED SITE: ICD-10-CM

## 2019-10-28 PROCEDURE — 99213 OFFICE O/P EST LOW 20 MIN: CPT

## 2019-10-30 ENCOUNTER — TRANSCRIPTION ENCOUNTER (OUTPATIENT)
Age: 39
End: 2019-10-30

## 2019-10-31 ENCOUNTER — TRANSCRIPTION ENCOUNTER (OUTPATIENT)
Age: 39
End: 2019-10-31

## 2019-10-31 ENCOUNTER — FORM ENCOUNTER (OUTPATIENT)
Age: 39
End: 2019-10-31

## 2019-10-31 ENCOUNTER — RX RENEWAL (OUTPATIENT)
Age: 39
End: 2019-10-31

## 2019-11-01 ENCOUNTER — APPOINTMENT (OUTPATIENT)
Dept: INTERNAL MEDICINE | Facility: CLINIC | Age: 39
End: 2019-11-01
Payer: MEDICAID

## 2019-11-01 ENCOUNTER — OUTPATIENT (OUTPATIENT)
Dept: OUTPATIENT SERVICES | Facility: HOSPITAL | Age: 39
LOS: 1 days | End: 2019-11-01
Payer: COMMERCIAL

## 2019-11-01 VITALS — TEMPERATURE: 97.9 F | WEIGHT: 152 LBS | HEIGHT: 68 IN | BODY MASS INDEX: 23.04 KG/M2 | OXYGEN SATURATION: 98 %

## 2019-11-01 DIAGNOSIS — M25.531 PAIN IN RIGHT WRIST: ICD-10-CM

## 2019-11-01 DIAGNOSIS — Z90.49 ACQUIRED ABSENCE OF OTHER SPECIFIED PARTS OF DIGESTIVE TRACT: Chronic | ICD-10-CM

## 2019-11-01 DIAGNOSIS — Z98.89 OTHER SPECIFIED POSTPROCEDURAL STATES: Chronic | ICD-10-CM

## 2019-11-01 DIAGNOSIS — Q63.9 CONGENITAL MALFORMATION OF KIDNEY, UNSPECIFIED: Chronic | ICD-10-CM

## 2019-11-01 DIAGNOSIS — Z90.5 ACQUIRED ABSENCE OF KIDNEY: Chronic | ICD-10-CM

## 2019-11-01 DIAGNOSIS — K08.409 PARTIAL LOSS OF TEETH, UNSPECIFIED CAUSE, UNSPECIFIED CLASS: Chronic | ICD-10-CM

## 2019-11-01 PROCEDURE — 73110 X-RAY EXAM OF WRIST: CPT

## 2019-11-01 PROCEDURE — 73110 X-RAY EXAM OF WRIST: CPT | Mod: 26,RT

## 2019-11-01 PROCEDURE — 99213 OFFICE O/P EST LOW 20 MIN: CPT

## 2019-11-04 ENCOUNTER — TRANSCRIPTION ENCOUNTER (OUTPATIENT)
Age: 39
End: 2019-11-04

## 2019-11-05 ENCOUNTER — APPOINTMENT (OUTPATIENT)
Dept: RHEUMATOLOGY | Facility: CLINIC | Age: 39
End: 2019-11-05
Payer: MEDICAID

## 2019-11-05 VITALS
BODY MASS INDEX: 22.73 KG/M2 | DIASTOLIC BLOOD PRESSURE: 71 MMHG | SYSTOLIC BLOOD PRESSURE: 104 MMHG | OXYGEN SATURATION: 99 % | HEART RATE: 82 BPM | HEIGHT: 68 IN | TEMPERATURE: 98.7 F | WEIGHT: 150 LBS

## 2019-11-05 PROCEDURE — 99214 OFFICE O/P EST MOD 30 MIN: CPT | Mod: 25

## 2019-11-05 PROCEDURE — 36415 COLL VENOUS BLD VENIPUNCTURE: CPT

## 2019-11-05 NOTE — PHYSICAL EXAM
[General Appearance - Alert] : alert [General Appearance - In No Acute Distress] : in no acute distress [General Appearance - Well Nourished] : well nourished [General Appearance - Well Developed] : well developed [Extraocular Movements] : extraocular movements were intact [Outer Ear] : the ears and nose were normal in appearance [Nasal Cavity] : the nasal mucosa and septum were normal [Oropharynx] : the oropharynx was normal [Neck Appearance] : the appearance of the neck was normal [Respiration, Rhythm And Depth] : normal respiratory rhythm and effort [Auscultation Breath Sounds / Voice Sounds] : lungs were clear to auscultation bilaterally [Heart Rate And Rhythm] : heart rate was normal and rhythm regular [Heart Sounds] : normal S1 and S2 [Heart Sounds Gallop] : no gallops [Murmurs] : no murmurs [Heart Sounds Pericardial Friction Rub] : no pericardial rub [Edema] : there was no peripheral edema [Abdomen Soft] : soft [Abdomen Tenderness] : non-tender [No Spinal Tenderness] : no spinal tenderness [Abnormal Walk] : normal gait [Musculoskeletal - Swelling] : no joint swelling seen [Motor Tone] : muscle strength and tone were normal [] : no rash [Skin Lesions] : no skin lesions [Cranial Nerves] : cranial nerves 2-12 were intact [No Focal Deficits] : no focal deficits

## 2019-11-06 LAB
ALBUMIN SERPL ELPH-MCNC: 4.5 G/DL
ALP BLD-CCNC: 47 U/L
ALT SERPL-CCNC: 9 U/L
ANION GAP SERPL CALC-SCNC: 13 MMOL/L
AST SERPL-CCNC: 14 U/L
BASOPHILS # BLD AUTO: 0.04 K/UL
BASOPHILS NFR BLD AUTO: 0.5 %
BILIRUB SERPL-MCNC: 0.6 MG/DL
BUN SERPL-MCNC: 15 MG/DL
C3 SERPL-MCNC: 109 MG/DL
C4 SERPL-MCNC: 23 MG/DL
CALCIUM SERPL-MCNC: 9.8 MG/DL
CCP AB SER IA-ACNC: <8 UNITS
CHLORIDE SERPL-SCNC: 102 MMOL/L
CO2 SERPL-SCNC: 24 MMOL/L
CREAT SERPL-MCNC: 1.07 MG/DL
CRP SERPL-MCNC: <0.1 MG/DL
EOSINOPHIL # BLD AUTO: 0.63 K/UL
EOSINOPHIL NFR BLD AUTO: 8.4 %
ERYTHROCYTE [SEDIMENTATION RATE] IN BLOOD BY WESTERGREN METHOD: 14 MM/HR
GLUCOSE SERPL-MCNC: 91 MG/DL
HCT VFR BLD CALC: 44.8 %
HGB BLD-MCNC: 14.1 G/DL
IGA SER QL IEP: 368 MG/DL
IGG SER QL IEP: 1254 MG/DL
IGM SER QL IEP: 208 MG/DL
IMM GRANULOCYTES NFR BLD AUTO: 0.3 %
LYMPHOCYTES # BLD AUTO: 2.35 K/UL
LYMPHOCYTES NFR BLD AUTO: 31.5 %
MAN DIFF?: NORMAL
MCHC RBC-ENTMCNC: 28 PG
MCHC RBC-ENTMCNC: 31.5 GM/DL
MCV RBC AUTO: 89.1 FL
MONOCYTES # BLD AUTO: 0.55 K/UL
MONOCYTES NFR BLD AUTO: 7.4 %
NEUTROPHILS # BLD AUTO: 3.87 K/UL
NEUTROPHILS NFR BLD AUTO: 51.9 %
PLATELET # BLD AUTO: 260 K/UL
POTASSIUM SERPL-SCNC: 4.5 MMOL/L
PROT SERPL-MCNC: 7.5 G/DL
RBC # BLD: 5.03 M/UL
RBC # FLD: 12.7 %
RF+CCP IGG SER-IMP: NEGATIVE
RHEUMATOID FACT SER QL: 11 IU/ML
SODIUM SERPL-SCNC: 139 MMOL/L
WBC # FLD AUTO: 7.46 K/UL

## 2019-11-07 ENCOUNTER — TRANSCRIPTION ENCOUNTER (OUTPATIENT)
Age: 39
End: 2019-11-07

## 2019-11-07 NOTE — HISTORY OF PRESENT ILLNESS
[___ Month(s) Ago] : [unfilled] month(s) ago [FreeTextEntry1] : Office Visit 3/12/19:\par Initially patient noted to have +serologies for Sjogren's syndrome, however she denied any sicca symptoms.\par Patient had surgery for her endometriosis Jan 25 and following this she developed xeropthalmia. \par Diagnosed by ophtho with dry eyes a few weeks ago - told she could have Sj?gren's based on her ocular findings. Noted corneal damage and blepharitis. She was given prednisone drops, artificial tears with relief. Has not tried restasis yet.\par Her mouth is not dry. Does not need water to swallow food. She drinks a lot of water though. \par Feels very tired right now and notes she is sleeping more often.\par Mild body aches, no arthralgias or pain in small joints of hands \par No vaginal dryness \par Plan: Patient now with dry eyes, diagnosed by ophtho by schirmer test. \par She denies additional sicca symptoms but does note fatigue since her surgery. It is possible that surgery has led to the onset of her autoimmune condition. We discussed her options, including symptomatic treatment for sicca, evaluation with a lip biopsy and plaquenil. She chose to continue symptomatic treatment with eye drops for now. Will continue to monitor for additional symptoms, and consider Plaquenil should her fatigue persist. \par A lip biopsy would ultimately be very helpful to definitively diagnose her, but she is tried from recent surgery/doctors appointments and thus we will hold off for now. \par \par Initial Visit: \par Patient seen and examined with Dr. Solorzano. \par Agree with history, physical exam, assessment and plan as described above with exception of the following additions and changes:\par 28 year old presents for evaluation of MAX 1:640. \par Patient with severe endometriosis which likely explains this particularly as she has no other symptoms or signs suggesting an autoimmune condition. \par Will send serologic work up today. \par

## 2019-11-07 NOTE — ASSESSMENT
[FreeTextEntry1] : 39 year old presents for follow up evaluation of MAX 1:640. SSa/SSb positive.\par Start Plaquenil for fatigue / arthralgias\par Will await surgical pathology from L shoulder surgery\par Check labs for disease activity

## 2019-11-22 ENCOUNTER — TRANSCRIPTION ENCOUNTER (OUTPATIENT)
Age: 39
End: 2019-11-22

## 2019-11-24 ENCOUNTER — TRANSCRIPTION ENCOUNTER (OUTPATIENT)
Age: 39
End: 2019-11-24

## 2019-11-25 ENCOUNTER — TRANSCRIPTION ENCOUNTER (OUTPATIENT)
Age: 39
End: 2019-11-25

## 2020-01-06 ENCOUNTER — APPOINTMENT (OUTPATIENT)
Dept: RHEUMATOLOGY | Facility: CLINIC | Age: 40
End: 2020-01-06

## 2020-01-19 NOTE — ASU PATIENT PROFILE, ADULT - NSALCOHOLAMT_GEN_A_CORE_SD
Introduced self to patient. Oriented to environment. Explained process and plan of care.  Ambulated to bathroom to void.  Gait steady. Tolerating activity well   1-2 drinks

## 2020-01-28 ENCOUNTER — EMERGENCY (EMERGENCY)
Facility: HOSPITAL | Age: 40
LOS: 1 days | Discharge: HOME | End: 2020-01-28
Attending: EMERGENCY MEDICINE | Admitting: EMERGENCY MEDICINE
Payer: COMMERCIAL

## 2020-01-28 VITALS
OXYGEN SATURATION: 97 % | RESPIRATION RATE: 16 BRPM | TEMPERATURE: 98 F | DIASTOLIC BLOOD PRESSURE: 65 MMHG | HEART RATE: 87 BPM | SYSTOLIC BLOOD PRESSURE: 109 MMHG

## 2020-01-28 VITALS
TEMPERATURE: 98 F | RESPIRATION RATE: 16 BRPM | SYSTOLIC BLOOD PRESSURE: 105 MMHG | OXYGEN SATURATION: 98 % | DIASTOLIC BLOOD PRESSURE: 72 MMHG | HEART RATE: 80 BPM

## 2020-01-28 DIAGNOSIS — Z90.5 ACQUIRED ABSENCE OF KIDNEY: Chronic | ICD-10-CM

## 2020-01-28 DIAGNOSIS — Q63.9 CONGENITAL MALFORMATION OF KIDNEY, UNSPECIFIED: Chronic | ICD-10-CM

## 2020-01-28 DIAGNOSIS — Z98.89 OTHER SPECIFIED POSTPROCEDURAL STATES: Chronic | ICD-10-CM

## 2020-01-28 DIAGNOSIS — N83.202 UNSPECIFIED OVARIAN CYST, LEFT SIDE: ICD-10-CM

## 2020-01-28 DIAGNOSIS — Z90.49 ACQUIRED ABSENCE OF OTHER SPECIFIED PARTS OF DIGESTIVE TRACT: Chronic | ICD-10-CM

## 2020-01-28 DIAGNOSIS — R10.2 PELVIC AND PERINEAL PAIN: ICD-10-CM

## 2020-01-28 DIAGNOSIS — K08.409 PARTIAL LOSS OF TEETH, UNSPECIFIED CAUSE, UNSPECIFIED CLASS: Chronic | ICD-10-CM

## 2020-01-28 LAB
ALBUMIN SERPL ELPH-MCNC: 4.2 G/DL — SIGNIFICANT CHANGE UP (ref 3.3–5)
ALP SERPL-CCNC: 47 U/L — SIGNIFICANT CHANGE UP (ref 40–120)
ALT FLD-CCNC: 12 U/L — SIGNIFICANT CHANGE UP (ref 10–45)
ANION GAP SERPL CALC-SCNC: 12 MMOL/L — SIGNIFICANT CHANGE UP (ref 5–17)
APPEARANCE UR: CLEAR — SIGNIFICANT CHANGE UP
AST SERPL-CCNC: 20 U/L — SIGNIFICANT CHANGE UP (ref 10–40)
BASOPHILS # BLD AUTO: 0.06 K/UL — SIGNIFICANT CHANGE UP (ref 0–0.2)
BASOPHILS NFR BLD AUTO: 0.6 % — SIGNIFICANT CHANGE UP (ref 0–2)
BILIRUB SERPL-MCNC: 0.3 MG/DL — SIGNIFICANT CHANGE UP (ref 0.2–1.2)
BILIRUB UR-MCNC: NEGATIVE — SIGNIFICANT CHANGE UP
BLD GP AB SCN SERPL QL: NEGATIVE — SIGNIFICANT CHANGE UP
BUN SERPL-MCNC: 9 MG/DL — SIGNIFICANT CHANGE UP (ref 7–23)
CALCIUM SERPL-MCNC: 9.2 MG/DL — SIGNIFICANT CHANGE UP (ref 8.4–10.5)
CHLORIDE SERPL-SCNC: 102 MMOL/L — SIGNIFICANT CHANGE UP (ref 96–108)
CO2 SERPL-SCNC: 24 MMOL/L — SIGNIFICANT CHANGE UP (ref 22–31)
COLOR SPEC: SIGNIFICANT CHANGE UP
CREAT SERPL-MCNC: 1.01 MG/DL — SIGNIFICANT CHANGE UP (ref 0.5–1.3)
DIFF PNL FLD: NEGATIVE — SIGNIFICANT CHANGE UP
EOSINOPHIL # BLD AUTO: 0.17 K/UL — SIGNIFICANT CHANGE UP (ref 0–0.5)
EOSINOPHIL NFR BLD AUTO: 1.8 % — SIGNIFICANT CHANGE UP (ref 0–6)
GLUCOSE SERPL-MCNC: 98 MG/DL — SIGNIFICANT CHANGE UP (ref 70–99)
GLUCOSE UR QL: NEGATIVE — SIGNIFICANT CHANGE UP
HCG SERPL-ACNC: 36 MIU/ML — HIGH
HCT VFR BLD CALC: 40.4 % — SIGNIFICANT CHANGE UP (ref 34.5–45)
HGB BLD-MCNC: 13.7 G/DL — SIGNIFICANT CHANGE UP (ref 11.5–15.5)
IMM GRANULOCYTES NFR BLD AUTO: 0.3 % — SIGNIFICANT CHANGE UP (ref 0–1.5)
KETONES UR-MCNC: NEGATIVE — SIGNIFICANT CHANGE UP
LEUKOCYTE ESTERASE UR-ACNC: NEGATIVE — SIGNIFICANT CHANGE UP
LYMPHOCYTES # BLD AUTO: 2.95 K/UL — SIGNIFICANT CHANGE UP (ref 1–3.3)
LYMPHOCYTES # BLD AUTO: 31 % — SIGNIFICANT CHANGE UP (ref 13–44)
MCHC RBC-ENTMCNC: 28.2 PG — SIGNIFICANT CHANGE UP (ref 27–34)
MCHC RBC-ENTMCNC: 33.9 GM/DL — SIGNIFICANT CHANGE UP (ref 32–36)
MCV RBC AUTO: 83.1 FL — SIGNIFICANT CHANGE UP (ref 80–100)
MONOCYTES # BLD AUTO: 0.65 K/UL — SIGNIFICANT CHANGE UP (ref 0–0.9)
MONOCYTES NFR BLD AUTO: 6.8 % — SIGNIFICANT CHANGE UP (ref 2–14)
NEUTROPHILS # BLD AUTO: 5.66 K/UL — SIGNIFICANT CHANGE UP (ref 1.8–7.4)
NEUTROPHILS NFR BLD AUTO: 59.5 % — SIGNIFICANT CHANGE UP (ref 43–77)
NITRITE UR-MCNC: NEGATIVE — SIGNIFICANT CHANGE UP
NRBC # BLD: 0 /100 WBCS — SIGNIFICANT CHANGE UP (ref 0–0)
PH UR: 7 — SIGNIFICANT CHANGE UP (ref 5–8)
PLATELET # BLD AUTO: 309 K/UL — SIGNIFICANT CHANGE UP (ref 150–400)
POTASSIUM SERPL-MCNC: 4.5 MMOL/L — SIGNIFICANT CHANGE UP (ref 3.5–5.3)
POTASSIUM SERPL-SCNC: 4.5 MMOL/L — SIGNIFICANT CHANGE UP (ref 3.5–5.3)
PROT SERPL-MCNC: 7.5 G/DL — SIGNIFICANT CHANGE UP (ref 6–8.3)
PROT UR-MCNC: NEGATIVE MG/DL — SIGNIFICANT CHANGE UP
RBC # BLD: 4.86 M/UL — SIGNIFICANT CHANGE UP (ref 3.8–5.2)
RBC # FLD: 12.2 % — SIGNIFICANT CHANGE UP (ref 10.3–14.5)
RH IG SCN BLD-IMP: POSITIVE — SIGNIFICANT CHANGE UP
SODIUM SERPL-SCNC: 138 MMOL/L — SIGNIFICANT CHANGE UP (ref 135–145)
SP GR SPEC: <=1.005 — SIGNIFICANT CHANGE UP (ref 1–1.03)
UROBILINOGEN FLD QL: 0.2 E.U./DL — SIGNIFICANT CHANGE UP
WBC # BLD: 9.52 K/UL — SIGNIFICANT CHANGE UP (ref 3.8–10.5)
WBC # FLD AUTO: 9.52 K/UL — SIGNIFICANT CHANGE UP (ref 3.8–10.5)

## 2020-01-28 PROCEDURE — 76856 US EXAM PELVIC COMPLETE: CPT

## 2020-01-28 PROCEDURE — 86901 BLOOD TYPING SEROLOGIC RH(D): CPT

## 2020-01-28 PROCEDURE — 86850 RBC ANTIBODY SCREEN: CPT

## 2020-01-28 PROCEDURE — 76856 US EXAM PELVIC COMPLETE: CPT | Mod: 26

## 2020-01-28 PROCEDURE — 96374 THER/PROPH/DIAG INJ IV PUSH: CPT

## 2020-01-28 PROCEDURE — 84702 CHORIONIC GONADOTROPIN TEST: CPT

## 2020-01-28 PROCEDURE — 85025 COMPLETE CBC W/AUTO DIFF WBC: CPT

## 2020-01-28 PROCEDURE — 80053 COMPREHEN METABOLIC PANEL: CPT

## 2020-01-28 PROCEDURE — 76830 TRANSVAGINAL US NON-OB: CPT | Mod: 26

## 2020-01-28 PROCEDURE — 96375 TX/PRO/DX INJ NEW DRUG ADDON: CPT

## 2020-01-28 PROCEDURE — 99284 EMERGENCY DEPT VISIT MOD MDM: CPT | Mod: 25

## 2020-01-28 PROCEDURE — 81003 URINALYSIS AUTO W/O SCOPE: CPT

## 2020-01-28 PROCEDURE — 36415 COLL VENOUS BLD VENIPUNCTURE: CPT

## 2020-01-28 PROCEDURE — 99285 EMERGENCY DEPT VISIT HI MDM: CPT

## 2020-01-28 PROCEDURE — 76830 TRANSVAGINAL US NON-OB: CPT

## 2020-01-28 RX ORDER — ACETAMINOPHEN 500 MG
1000 TABLET ORAL ONCE
Refills: 0 | Status: COMPLETED | OUTPATIENT
Start: 2020-01-28 | End: 2020-01-28

## 2020-01-28 RX ORDER — KETOROLAC TROMETHAMINE 30 MG/ML
30 SYRINGE (ML) INJECTION ONCE
Refills: 0 | Status: DISCONTINUED | OUTPATIENT
Start: 2020-01-28 | End: 2020-01-28

## 2020-01-28 RX ORDER — ALPRAZOLAM 0.25 MG
0.5 TABLET ORAL ONCE
Refills: 0 | Status: DISCONTINUED | OUTPATIENT
Start: 2020-01-28 | End: 2020-01-28

## 2020-01-28 RX ORDER — ONDANSETRON 8 MG/1
4 TABLET, FILM COATED ORAL ONCE
Refills: 0 | Status: COMPLETED | OUTPATIENT
Start: 2020-01-28 | End: 2020-01-28

## 2020-01-28 RX ADMIN — Medication 30 MILLIGRAM(S): at 17:06

## 2020-01-28 RX ADMIN — Medication 1000 MILLIGRAM(S): at 21:01

## 2020-01-28 RX ADMIN — Medication 30 MILLIGRAM(S): at 21:02

## 2020-01-28 RX ADMIN — ONDANSETRON 4 MILLIGRAM(S): 8 TABLET, FILM COATED ORAL at 17:06

## 2020-01-28 RX ADMIN — Medication 0.5 MILLIGRAM(S): at 17:06

## 2020-01-28 NOTE — ED PROVIDER NOTE - PMH
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  Insomnia    Migraines    Panic disorder    Sjogren's syndrome    UPJ stricture, acquired

## 2020-01-28 NOTE — CONSULT NOTE ADULT - ASSESSMENT
40 yo P0 with known extensive h/o endometriosis with 6 prior surgeries presenting to ED for evaluation of worsening pelvic pain over the past few weeks. Patient with 8.5cm left ovarian cyst on U/S with appropriate Doppler flow seen to both ovaries. Patient with TTP over LLQ, only received toradol IV x 1 and did not require any more pain medicine. Patient has appointment with Dr. Finkelstein tomorrow 1/29.    Patient d/w Dr. Ledbetter

## 2020-01-28 NOTE — ED PROVIDER NOTE - OBJECTIVE STATEMENT
40 y/o F with h/o endometriosis, right nephrectomy, colon resection, appendectomy, left shoulder arthroscopy, anxiety and questionable hx of Sjogren's syndrome presents to ED today with complaints of feeling feverish and having abdominal pain over the weekend. She went to her GYN first because she thought she had a UTI but labs were found negative; she went to a clinic which originally told her that she was positive for a UTI and gave her antibiotics, and then the culture was later found to be negative. She reports that she was still feeling bad even after treatment. Pt f/u at urgent care this morning where she got an US, impression: L ovary septated cyst 9.1 x 5.5 x 5.6cm. Pt reports that she got pregnant in November, but had a surgical  3.5 weeks ago, and that she has fany having pain since then. Pt also takes Valium for anxiety.

## 2020-01-28 NOTE — ED PROVIDER NOTE - PROGRESS NOTE DETAILS
Pt seen and examined by gyn. PT has appt w/ Dr Finkelstein tomorrow. Pt can f/u in office tomorrow for surgical planning

## 2020-01-28 NOTE — ED ADULT TRIAGE NOTE - OTHER COMPLAINTS
Patient states on history of endometriosis. Patient also [facility] . She currently denies vaginal bleeding.

## 2020-01-28 NOTE — ED PROVIDER NOTE - NSFOLLOWUPINSTRUCTIONS_ED_ALL_ED_FT
You were evaluated in the ED for ovarian cyst. You were evaluated by ob/gyn. Please keep your appt with Dr Finkelstein tomorrow for surgical planning and further evaluation.  Ovarian Cyst    WHAT YOU NEED TO KNOW:    An ovarian cyst is a sac that grows on an ovary. This sac usually contains fluid, but may sometimes have blood or tissue in it. Most ovarian cysts are harmless and go away without treatment in a few months. Some cysts can grow large, cause pain, or break open.     DISCHARGE INSTRUCTIONS:    Call 911 for any of the following:     You are too weak or dizzy to stand up.        Return to the emergency department if:     You have severe abdominal pain. The pain may be sharp and sudden.      You have a fever.    Contact your healthcare provider if:     Your periods are early, late, or more painful than usual.      You have bleeding from your vagina that is not your period.      You have abdominal pain all the time.      Your abdomen is swollen.      You have feelings of fullness, pressure, or discomfort in your abdomen.      You have trouble urinating or emptying your bladder completely.      You have pain during sex.      You are losing weight without trying.      You have questions or concerns about your condition or care.    Medicines: You may need any of the following:     NSAIDs, such as ibuprofen, help decrease swelling, pain, and fever. This medicine is available with or without a doctor's order. NSAIDs can cause stomach bleeding or kidney problems in certain people. If you take blood thinner medicine, always ask if NSAIDs are safe for you. Always read the medicine label and follow directions. Do not give these medicines to children under 6 months of age without direction from your child's healthcare provider.      Birth control pills may help to control your periods, prevent cysts, or cause them to shrink.      Take your medicine as directed. Contact your healthcare provider if you think your medicine is not helping or if you have side effects. Tell him or her if you are allergic to any medicine. Keep a list of the medicines, vitamins, and herbs you take. Include the amounts, and when and why you take them. Bring the list or the pill bottles to follow-up visits. Carry your medicine list with you in case of an emergency.    Follow up with your healthcare provider as directed: Write down your questions so you remember to ask them during your visits.     Apply heat to decrease pain and cramping: Sit in a warm bath, or place a heating pad (turned on low) or a hot water bottle on your abdomen. Do this for 15 to 20 minutes every hour for as many days as directed.

## 2020-01-28 NOTE — ED PROVIDER NOTE - CARE PROVIDER_API CALL
Finkelstein, Seth A (MD)  Obstetrics and Gynecology  [address] [place] [address]  Phone: [phone]  Fax: [phone]  Follow Up Time:

## 2020-01-28 NOTE — ED PROVIDER NOTE - PHYSICAL EXAMINATION
General: Patient is well developed and well nourished. Patient is alert and oriented to person, place and date. Patient is laying comfortably in stretcher and appears in no acute distress.  HEENT: Head is normocephalic and atraumatic. Pupils are equal, round and reactive. Extraocular movements intact. No evidence of nystagmus, conjunctival injection, or scleral icterus. External ears symmetric without evidence of discharge.  Nose is symmetric, non-tender, patent without evidence of discharge. Teeth in good repair. Uvula midline.   Neck: Supple with no evidence of lymphadenopathy.  Full range of motion.  Heart: Regular rate and rhythm. No murmurs, rubs or gallops.   Lungs: Clear to auscultation bilaterally with equal chest expansion. No note of wheezes, rhonchi, rales. Equal chest expansion. No note of retractions.  Abdomen: Bowel sounds present in all four quadrants. Soft, non-tender, non-distended without signs of masses, rebound or guarding. No note of hepatosplenomegaly. No CVA tenderness bilaterally. Negative Gutierrez sign. No pain present over McBurney's point.  Neuro: GCS 15. Moving all extremities without discomfort. gait steady   Skin: Warm, dry and intact without evidence of rashes, bruising, pallor, jaundice or cyanosis.   Psych: Mood and affect appropriate.

## 2020-01-28 NOTE — ED PROVIDER NOTE - PATIENT PORTAL LINK FT
You can access the FollowMyHealth Patient Portal offered by [facility] by registering at the following website: [email or web address]. By joining [facility]’s FollowMyHealth portal, you will also be able to view your health information using other applications (apps) compatible with our system.

## 2020-01-28 NOTE — ED ADULT NURSE NOTE - OBJECTIVE STATEMENT
c/o RLQ and suprapubic pain x ~3 weeks accompanied with subjective fever. Pt states bladder feels like it's brenda. Denies dysuria, vomiting or diarrhea. Pt states had an  via D&C ~ 3weeks ago.

## 2020-01-28 NOTE — CONSULT NOTE ADULT - SUBJECTIVE AND OBJECTIVE BOX
Patient evaluated at bedside.   38 yo P0 with known extensive h/o endometriosis with 6 prior surgeries presenting to ED for evaluation of worsening pelvic pain over the past few weeks. [facility] and reports that since then pain has been getting worse. Patient reporting 7/10 pain at its worst that radiates to her lower back. She initially thought it was just related to her endometriosis but went to urgent care today who did sonogram which showed left ovarian cyst and sent her to ED for further evaluation.   Patient denies fever, chills, chest pain, SOB, abdominal pain, nausea, vomiting, vaginal bleeding      OBHx:   GYN Hx:  PMHx:   SHx:  Meds:   Allergies:     PHYSICAL EXAM:   Vital Signs Last 24 Hrs  T(C): 36.8 (28 Jan 2020 19:07), Max: 36.9 (28 Jan 2020 16:09)  T(F): 98.2 (28 Jan 2020 19:07), Max: 98.4 (28 Jan 2020 16:09)  HR: 82 (28 Jan 2020 19:07) (82 - 87)  BP: 103/70 (28 Jan 2020 19:07) (103/70 - 109/65)  BP(mean): --  RR: 16 (28 Jan 2020 19:07) (16 - 16)  SpO2: 98% (28 Jan 2020 19:07) (97% - 98%)    **************************  Constitutional: Alert & Oriented x3, No acute distress  Respiratory: Clear to ausculation bilaterally; no wheezing, rhonchi, or crackles  Cardiovascular: regular rate and rhythm, no murmurs, or gallops  Gastrointestinal: soft, non tender, positive bowel sounds, no rebound or guarding   Pelvic exam:   Extremities: no calf tenderness or swelling      LABS:                        13.7   9.52  )-----------( 309      ( 28 Jan 2020 16:59 )             40.4     01-28    138  |  102  |  9   ----------------------------<  98  4.5   |  24  |  1.01    Ca    9.2      28 Jan 2020 16:59    TPro  7.5  /  Alb  4.2  /  TBili  0.3  /  DBili  x   /  AST  20  /  ALT  12  /  AlkPhos  47  01-28      Urinalysis Basic - ( 28 Jan 2020 17:12 )    Color: Straw / Appearance: Clear / SG: <=1.005 / pH: x  Gluc: x / Ketone: NEGATIVE  / Bili: Negative / Urobili: 0.2 E.U./dL   Blood: x / Protein: NEGATIVE mg/dL / Nitrite: NEGATIVE   Leuk Esterase: NEGATIVE / RBC: x / WBC x   Sq Epi: x / Non Sq Epi: x / Bacteria: x      HCG Quantitative, Serum: 36 mIU/mL (01-28 @ 16:59)      RADIOLOGY & ADDITIONAL STUDIES: Patient evaluated at bedside.   40 yo P0 with known extensive h/o endometriosis with 6 prior surgeries presenting to ED for evaluation of worsening pelvic pain over the past few weeks. [facility] and reports that since then pain has been getting worse. Patient reporting 7/10 pain at its worst that radiates to her lower back. She initially thought it was just related to her endometriosis but went to urgent care today who did sonogram which showed left ovarian cyst and sent her to ED for further evaluation.     Patient denies fever, chills, chest pain, SOB, abdominal pain, nausea, vomiting, vaginal bleeding      OBHx: G1 s/p vTOP d+c in december  GYN Hx: endometriosis s/p 6 prior resection   PMHx: Sjogren's syndrome   SHx: 6 prior endometriosis resections with bowel resection, right nephrectomy 2/2 endo 10 years ago,   Meds: plaquenil   Allergies: NKDA    PHYSICAL EXAM:   Vital Signs Last 24 Hrs  T(C): 36.8 (28 Jan 2020 19:07), Max: 36.9 (28 Jan 2020 16:09)  T(F): 98.2 (28 Jan 2020 19:07), Max: 98.4 (28 Jan 2020 16:09)  HR: 82 (28 Jan 2020 19:07) (82 - 87)  BP: 103/70 (28 Jan 2020 19:07) (103/70 - 109/65)  BP(mean): --  RR: 16 (28 Jan 2020 19:07) (16 - 16)  SpO2: 98% (28 Jan 2020 19:07) (97% - 98%)    **************************  Constitutional: Alert & Oriented x3, No acute distress  Respiratory: Clear to ausculation bilaterally  Cardiovascular: regular rate and rhythm,  Gastrointestinal: soft, mild tenderness to palpation over LLQ, positive bowel sounds, no rebound or guarding   Pelvic exam: fullness of left adnexa, no CMT, TTP over left adnexa  Extremities: no calf tenderness or swelling      LABS:                        13.7   9.52  )-----------( 309      ( 28 Jan 2020 16:59 )             40.4     01-28    138  |  102  |  9   ----------------------------<  98  4.5   |  24  |  1.01    Ca    9.2      28 Jan 2020 16:59    TPro  7.5  /  Alb  4.2  /  TBili  0.3  /  DBili  x   /  AST  20  /  ALT  12  /  AlkPhos  47  01-28      Urinalysis Basic - ( 28 Jan 2020 17:12 )    Color: Straw / Appearance: Clear / SG: <=1.005 / pH: x  Gluc: x / Ketone: NEGATIVE  / Bili: Negative / Urobili: 0.2 E.U./dL   Blood: x / Protein: NEGATIVE mg/dL / Nitrite: NEGATIVE   Leuk Esterase: NEGATIVE / RBC: x / WBC x   Sq Epi: x / Non Sq Epi: x / Bacteria: x      HCG Quantitative, Serum: 36 mIU/mL (01-28 @ 16:59)      RADIOLOGY & ADDITIONAL STUDIES:  < from: US Transvaginal (01.28.20 @ 18:24) >    IMPRESSION:   1. 8.5cm septated left ovarian cyst which was not seen on prior ultrasound from 12/5/2015. Consider further evaluation with nonemergent MRI. Enlarged left ovarian volume due to cyst. Can't exclude intermittent ovarian torsion. Follow-up 4-6 weeks to ensureresolution.    2. No intrauterine gestation is seen which is consistent with reported recent D&C and low Beta-hCG.    3. Small amount of free fluid in the cul-de-sac and right adnexa.      < end of copied text >

## 2020-01-28 NOTE — ED PROVIDER NOTE - ATTENDING CONTRIBUTION TO CARE
Pt w/ PMHx endometriosis, PSHx appendectomy, colon resection. R nephrectomy all 2/2 endometriosis. s/p D&C last month for VTOP, p/w LLQ for 3-4 days. Pt went to  was sent for pelvic US, found to have 9cm cyst on L ovary w/ septation, no flow documented. No vag bleeding. No change in vag discharge. Gyn Dr Kinsey. Pt w/ PMHx endometriosis, PSHx appendectomy, colon resection. R nephrectomy all 2/2 endometriosis. s/p D&C last month for VTOP, p/w LLQ for 3-4 days. Pt went to  was sent for pelvic US, found to have 9cm cyst on L ovary w/ septation, no flow documented. No vag bleeding. No change in vag discharge. Gyn Dr Finkelstein.  Constitutional: Well appearing, well nourished, awake, alert, oriented to person, place, time/situation and in no apparent distress.  ENMT: Airway patent. Normal MM  Eyes: Clear bilaterally  Gastrointestinal: Abd soft, ND, NABS. + mild L pelvic ttp. No guarding, rebound, or rigidity. No pulsatile abdominal masses. No organomegaly appreciated.   deferred to gyn  Musculoskeletal: Range of motion is not limited  Neuro: Alert and oriented x 3, face symmetric and speech fluent. Strength 5/5 x 4 ext and symmetric, nml gross motor movement, nml gait. No focal deficits noted.  Skin: Skin normal color for race, warm, dry and intact. No evidence of rash.  Psych: Alert and oriented to person, place, time/situation. normal mood and affect. no apparent risk to self or others.  Pt sent to ED for OSH US showing large L ovarian cyst w/o comment on flow. No severe pain on exam, but discomfort. Gyn consulted. Pt seen and examined by gyn, US reviewed. No acute gyn intervention. Cyst will need excision, but not emergently. Gyn feels exam not c/w torsion. Pt has f/u appt tomorrow w/ gyn

## 2020-01-28 NOTE — ED PROVIDER NOTE - PSH
Congenital renal anomaly  Right kidney corrective procedure (?), ureteral stent placment 2008.  History of appendectomy    History of arthroscopy of shoulder  Right Labrum Tear Repair-2012  History of nephrectomy, unilateral  right kidney 2010  S/P laparoscopic procedure  for endometriosis affecting bladder/colon/with ovary adhesion to uterus  Status post appendectomy    Status post colon resection    [place] teeth extracted

## 2020-01-28 NOTE — ED PROVIDER NOTE - CLINICAL SUMMARY MEDICAL DECISION MAKING FREE TEXT BOX
48 y/o F PMHx endometriosis, right nephrectomy, appendectomy, colon resection presents to ED with LLQ pain that began this past weekend, she presumed it was pain due to endometriosis. States that she got an US at urgent care which showed a 9cm ovarian cyst with septation, and was sent to the ED because the test did not evaluate blood flow to the ovary. On exam pt is well appearing, non-toxic, discomfort with palpation to LLQ and suprapubic regions. Plan for labs, meds and US.

## 2020-01-30 ENCOUNTER — TRANSCRIPTION ENCOUNTER (OUTPATIENT)
Age: 40
End: 2020-01-30

## 2020-01-31 ENCOUNTER — TRANSCRIPTION ENCOUNTER (OUTPATIENT)
Age: 40
End: 2020-01-31

## 2020-02-01 ENCOUNTER — OUTPATIENT (OUTPATIENT)
Dept: OUTPATIENT SERVICES | Facility: HOSPITAL | Age: 40
LOS: 1 days | End: 2020-02-01
Payer: MEDICAID

## 2020-02-01 DIAGNOSIS — Z90.49 ACQUIRED ABSENCE OF OTHER SPECIFIED PARTS OF DIGESTIVE TRACT: Chronic | ICD-10-CM

## 2020-02-01 DIAGNOSIS — Z90.5 ACQUIRED ABSENCE OF KIDNEY: Chronic | ICD-10-CM

## 2020-02-01 DIAGNOSIS — K08.409 PARTIAL LOSS OF TEETH, UNSPECIFIED CAUSE, UNSPECIFIED CLASS: Chronic | ICD-10-CM

## 2020-02-01 DIAGNOSIS — Z98.89 OTHER SPECIFIED POSTPROCEDURAL STATES: Chronic | ICD-10-CM

## 2020-02-01 DIAGNOSIS — Q63.9 CONGENITAL MALFORMATION OF KIDNEY, UNSPECIFIED: Chronic | ICD-10-CM

## 2020-02-06 ENCOUNTER — RX RENEWAL (OUTPATIENT)
Age: 40
End: 2020-02-06

## 2020-02-10 ENCOUNTER — RX RENEWAL (OUTPATIENT)
Age: 40
End: 2020-02-10

## 2020-02-24 ENCOUNTER — APPOINTMENT (OUTPATIENT)
Dept: INTERNAL MEDICINE | Facility: CLINIC | Age: 40
End: 2020-02-24
Payer: MEDICAID

## 2020-02-24 VITALS
OXYGEN SATURATION: 97 % | HEIGHT: 68 IN | DIASTOLIC BLOOD PRESSURE: 66 MMHG | SYSTOLIC BLOOD PRESSURE: 106 MMHG | WEIGHT: 154 LBS | BODY MASS INDEX: 23.34 KG/M2 | TEMPERATURE: 97.7 F | HEART RATE: 82 BPM

## 2020-02-24 DIAGNOSIS — M25.571 PAIN IN RIGHT ANKLE AND JOINTS OF RIGHT FOOT: ICD-10-CM

## 2020-02-24 DIAGNOSIS — K64.9 UNSPECIFIED HEMORRHOIDS: ICD-10-CM

## 2020-02-24 DIAGNOSIS — M25.512 PAIN IN LEFT SHOULDER: ICD-10-CM

## 2020-02-24 DIAGNOSIS — Z71.89 OTHER SPECIFIED COUNSELING: ICD-10-CM

## 2020-02-24 PROBLEM — M35.00 SJOGREN SYNDROME, UNSPECIFIED: Chronic | Status: ACTIVE | Noted: 2020-01-30

## 2020-02-24 PROCEDURE — 99214 OFFICE O/P EST MOD 30 MIN: CPT

## 2020-02-25 ENCOUNTER — TRANSCRIPTION ENCOUNTER (OUTPATIENT)
Age: 40
End: 2020-02-25

## 2020-02-26 ENCOUNTER — TRANSCRIPTION ENCOUNTER (OUTPATIENT)
Age: 40
End: 2020-02-26

## 2020-02-27 ENCOUNTER — TRANSCRIPTION ENCOUNTER (OUTPATIENT)
Age: 40
End: 2020-02-27

## 2020-03-01 PROCEDURE — G9001: CPT

## 2020-03-03 ENCOUNTER — TRANSCRIPTION ENCOUNTER (OUTPATIENT)
Age: 40
End: 2020-03-03

## 2020-03-09 ENCOUNTER — TRANSCRIPTION ENCOUNTER (OUTPATIENT)
Age: 40
End: 2020-03-09

## 2020-03-09 ENCOUNTER — RX RENEWAL (OUTPATIENT)
Age: 40
End: 2020-03-09

## 2020-03-11 ENCOUNTER — TRANSCRIPTION ENCOUNTER (OUTPATIENT)
Age: 40
End: 2020-03-11

## 2020-03-20 ENCOUNTER — TRANSCRIPTION ENCOUNTER (OUTPATIENT)
Age: 40
End: 2020-03-20

## 2020-03-23 ENCOUNTER — TRANSCRIPTION ENCOUNTER (OUTPATIENT)
Age: 40
End: 2020-03-23

## 2020-03-24 ENCOUNTER — TRANSCRIPTION ENCOUNTER (OUTPATIENT)
Age: 40
End: 2020-03-24

## 2020-03-25 ENCOUNTER — TRANSCRIPTION ENCOUNTER (OUTPATIENT)
Age: 40
End: 2020-03-25

## 2020-03-27 ENCOUNTER — TRANSCRIPTION ENCOUNTER (OUTPATIENT)
Age: 40
End: 2020-03-27

## 2020-03-28 ENCOUNTER — TRANSCRIPTION ENCOUNTER (OUTPATIENT)
Age: 40
End: 2020-03-28

## 2020-05-26 ENCOUNTER — TRANSCRIPTION ENCOUNTER (OUTPATIENT)
Age: 40
End: 2020-05-26

## 2020-05-27 ENCOUNTER — TRANSCRIPTION ENCOUNTER (OUTPATIENT)
Age: 40
End: 2020-05-27

## 2020-06-25 ENCOUNTER — TRANSCRIPTION ENCOUNTER (OUTPATIENT)
Age: 40
End: 2020-06-25

## 2020-07-13 ENCOUNTER — TRANSCRIPTION ENCOUNTER (OUTPATIENT)
Age: 40
End: 2020-07-13

## 2020-07-14 ENCOUNTER — APPOINTMENT (OUTPATIENT)
Dept: INTERNAL MEDICINE | Facility: CLINIC | Age: 40
End: 2020-07-14

## 2020-07-31 ENCOUNTER — APPOINTMENT (OUTPATIENT)
Dept: INTERNAL MEDICINE | Facility: CLINIC | Age: 40
End: 2020-07-31
Payer: MEDICAID

## 2020-07-31 VITALS
TEMPERATURE: 98.7 F | SYSTOLIC BLOOD PRESSURE: 106 MMHG | HEIGHT: 68 IN | WEIGHT: 144 LBS | DIASTOLIC BLOOD PRESSURE: 73 MMHG | HEART RATE: 84 BPM | OXYGEN SATURATION: 98 % | BODY MASS INDEX: 21.82 KG/M2

## 2020-07-31 DIAGNOSIS — N30.10 INTERSTITIAL CYSTITIS (CHRONIC) W/OUT HEMATURIA: ICD-10-CM

## 2020-07-31 PROCEDURE — 93000 ELECTROCARDIOGRAM COMPLETE: CPT

## 2020-07-31 PROCEDURE — 99214 OFFICE O/P EST MOD 30 MIN: CPT | Mod: 25

## 2020-07-31 PROCEDURE — 36415 COLL VENOUS BLD VENIPUNCTURE: CPT

## 2020-07-31 RX ORDER — NORTRIPTYLINE HYDROCHLORIDE 10 MG/1
10 CAPSULE ORAL
Qty: 90 | Refills: 0 | Status: DISCONTINUED | COMMUNITY
Start: 2019-10-10 | End: 2020-07-31

## 2020-07-31 RX ORDER — CELECOXIB 200 MG/1
200 CAPSULE ORAL
Qty: 30 | Refills: 0 | Status: DISCONTINUED | COMMUNITY
Start: 2020-01-09 | End: 2020-07-31

## 2020-08-02 ENCOUNTER — TRANSCRIPTION ENCOUNTER (OUTPATIENT)
Age: 40
End: 2020-08-02

## 2020-08-02 LAB
25(OH)D3 SERPL-MCNC: 44.6 NG/ML
ALBUMIN SERPL ELPH-MCNC: 4.8 G/DL
ALP BLD-CCNC: 53 U/L
ALT SERPL-CCNC: 14 U/L
ANION GAP SERPL CALC-SCNC: 16 MMOL/L
APPEARANCE: CLEAR
APTT BLD: 29.1 SEC
AST SERPL-CCNC: 21 U/L
BACTERIA: NEGATIVE
BASOPHILS # BLD AUTO: 0.03 K/UL
BASOPHILS NFR BLD AUTO: 0.5 %
BILIRUB SERPL-MCNC: 0.5 MG/DL
BILIRUBIN URINE: NEGATIVE
BLOOD URINE: NEGATIVE
BUN SERPL-MCNC: 15 MG/DL
CALCIUM SERPL-MCNC: 10 MG/DL
CHLORIDE SERPL-SCNC: 100 MMOL/L
CHOLEST SERPL-MCNC: 188 MG/DL
CHOLEST/HDLC SERPL: 1.5 RATIO
CO2 SERPL-SCNC: 24 MMOL/L
COLOR: NORMAL
CREAT SERPL-MCNC: 1.2 MG/DL
EOSINOPHIL # BLD AUTO: 0.23 K/UL
EOSINOPHIL NFR BLD AUTO: 4.2 %
GLUCOSE QUALITATIVE U: NEGATIVE
GLUCOSE SERPL-MCNC: 94 MG/DL
HCT VFR BLD CALC: 47 %
HDLC SERPL-MCNC: 122 MG/DL
HGB BLD-MCNC: 14.8 G/DL
HYALINE CASTS: 1 /LPF
IMM GRANULOCYTES NFR BLD AUTO: 0.2 %
INR PPP: 0.93 RATIO
KETONES URINE: NEGATIVE
LDLC SERPL CALC-MCNC: 53 MG/DL
LEUKOCYTE ESTERASE URINE: NEGATIVE
LYMPHOCYTES # BLD AUTO: 2.16 K/UL
LYMPHOCYTES NFR BLD AUTO: 39 %
MAN DIFF?: NORMAL
MCHC RBC-ENTMCNC: 28.1 PG
MCHC RBC-ENTMCNC: 31.5 GM/DL
MCV RBC AUTO: 89.4 FL
MICROSCOPIC-UA: NORMAL
MONOCYTES # BLD AUTO: 0.44 K/UL
MONOCYTES NFR BLD AUTO: 7.9 %
NEUTROPHILS # BLD AUTO: 2.67 K/UL
NEUTROPHILS NFR BLD AUTO: 48.2 %
NITRITE URINE: NEGATIVE
PH URINE: 8
PLATELET # BLD AUTO: 300 K/UL
POTASSIUM SERPL-SCNC: 4.8 MMOL/L
PROT SERPL-MCNC: 7.8 G/DL
PROTEIN URINE: NEGATIVE
PT BLD: 11 SEC
RBC # BLD: 5.26 M/UL
RBC # FLD: 12.7 %
RED BLOOD CELLS URINE: 0 /HPF
SODIUM SERPL-SCNC: 141 MMOL/L
SPECIFIC GRAVITY URINE: 1.01
SQUAMOUS EPITHELIAL CELLS: 1 /HPF
TRIGL SERPL-MCNC: 63 MG/DL
UROBILINOGEN URINE: NORMAL
WBC # FLD AUTO: 5.54 K/UL
WHITE BLOOD CELLS URINE: 0 /HPF

## 2020-08-03 LAB — ABO + RH PNL BLD: NORMAL

## 2020-08-10 ENCOUNTER — TRANSCRIPTION ENCOUNTER (OUTPATIENT)
Age: 40
End: 2020-08-10

## 2020-08-11 ENCOUNTER — OUTPATIENT (OUTPATIENT)
Dept: OUTPATIENT SERVICES | Facility: HOSPITAL | Age: 40
LOS: 1 days | Discharge: HOME | End: 2020-08-11

## 2020-08-11 DIAGNOSIS — Q63.9 CONGENITAL MALFORMATION OF KIDNEY, UNSPECIFIED: Chronic | ICD-10-CM

## 2020-08-11 DIAGNOSIS — Z90.49 ACQUIRED ABSENCE OF OTHER SPECIFIED PARTS OF DIGESTIVE TRACT: Chronic | ICD-10-CM

## 2020-08-11 DIAGNOSIS — Z90.5 ACQUIRED ABSENCE OF KIDNEY: Chronic | ICD-10-CM

## 2020-08-11 DIAGNOSIS — K08.409 PARTIAL LOSS OF TEETH, UNSPECIFIED CAUSE, UNSPECIFIED CLASS: Chronic | ICD-10-CM

## 2020-08-11 DIAGNOSIS — Z98.89 OTHER SPECIFIED POSTPROCEDURAL STATES: Chronic | ICD-10-CM

## 2020-08-13 LAB
CULTURE RESULTS: NO GROWTH — SIGNIFICANT CHANGE UP
SPECIMEN SOURCE: SIGNIFICANT CHANGE UP

## 2020-08-24 ENCOUNTER — RX RENEWAL (OUTPATIENT)
Age: 40
End: 2020-08-24

## 2020-10-06 ENCOUNTER — TRANSCRIPTION ENCOUNTER (OUTPATIENT)
Age: 40
End: 2020-10-06

## 2020-10-29 ENCOUNTER — APPOINTMENT (OUTPATIENT)
Dept: ORTHOPEDIC SURGERY | Facility: CLINIC | Age: 40
End: 2020-10-29
Payer: MEDICAID

## 2020-10-29 VITALS — RESPIRATION RATE: 16 BRPM | BODY MASS INDEX: 21.22 KG/M2 | WEIGHT: 140 LBS | HEIGHT: 68 IN

## 2020-10-29 PROCEDURE — 73130 X-RAY EXAM OF HAND: CPT | Mod: LT

## 2020-10-29 PROCEDURE — 73060 X-RAY EXAM OF HUMERUS: CPT | Mod: LT

## 2020-10-29 PROCEDURE — 99204 OFFICE O/P NEW MOD 45 MIN: CPT

## 2020-10-29 PROCEDURE — 99072 ADDL SUPL MATRL&STAF TM PHE: CPT

## 2020-11-03 ENCOUNTER — APPOINTMENT (OUTPATIENT)
Dept: INTERNAL MEDICINE | Facility: CLINIC | Age: 40
End: 2020-11-03
Payer: MEDICAID

## 2020-11-03 VITALS
TEMPERATURE: 97.9 F | DIASTOLIC BLOOD PRESSURE: 68 MMHG | SYSTOLIC BLOOD PRESSURE: 113 MMHG | BODY MASS INDEX: 22.13 KG/M2 | WEIGHT: 146 LBS | HEART RATE: 80 BPM | OXYGEN SATURATION: 98 % | HEIGHT: 68 IN

## 2020-11-03 DIAGNOSIS — Z01.818 ENCOUNTER FOR OTHER PREPROCEDURAL EXAMINATION: ICD-10-CM

## 2020-11-03 DIAGNOSIS — Z23 ENCOUNTER FOR IMMUNIZATION: ICD-10-CM

## 2020-11-03 DIAGNOSIS — R22.30 LOCALIZED SWELLING, MASS AND LUMP, UNSPECIFIED UPPER LIMB: ICD-10-CM

## 2020-11-03 DIAGNOSIS — Z11.59 ENCOUNTER FOR SCREENING FOR OTHER VIRAL DISEASES: ICD-10-CM

## 2020-11-03 PROCEDURE — 36415 COLL VENOUS BLD VENIPUNCTURE: CPT

## 2020-11-03 PROCEDURE — G0008: CPT

## 2020-11-03 PROCEDURE — 99072 ADDL SUPL MATRL&STAF TM PHE: CPT

## 2020-11-03 PROCEDURE — 99214 OFFICE O/P EST MOD 30 MIN: CPT | Mod: 25

## 2020-11-03 PROCEDURE — 90686 IIV4 VACC NO PRSV 0.5 ML IM: CPT

## 2020-11-04 ENCOUNTER — TRANSCRIPTION ENCOUNTER (OUTPATIENT)
Age: 40
End: 2020-11-04

## 2020-11-04 LAB
ALBUMIN SERPL ELPH-MCNC: 4.5 G/DL
ALP BLD-CCNC: 50 U/L
ALT SERPL-CCNC: 13 U/L
ANION GAP SERPL CALC-SCNC: 13 MMOL/L
AST SERPL-CCNC: 22 U/L
BASOPHILS # BLD AUTO: 0.06 K/UL
BASOPHILS NFR BLD AUTO: 1 %
BILIRUB SERPL-MCNC: 0.6 MG/DL
BUN SERPL-MCNC: 12 MG/DL
CALCIUM SERPL-MCNC: 9.7 MG/DL
CHLORIDE SERPL-SCNC: 103 MMOL/L
CO2 SERPL-SCNC: 24 MMOL/L
CREAT SERPL-MCNC: 1.17 MG/DL
EOSINOPHIL # BLD AUTO: 0.18 K/UL
EOSINOPHIL NFR BLD AUTO: 3 %
GLUCOSE SERPL-MCNC: 92 MG/DL
HCT VFR BLD CALC: 46.2 %
HGB BLD-MCNC: 14.7 G/DL
IMM GRANULOCYTES NFR BLD AUTO: 0.2 %
LYMPHOCYTES # BLD AUTO: 2.17 K/UL
LYMPHOCYTES NFR BLD AUTO: 36.7 %
MAN DIFF?: NORMAL
MCHC RBC-ENTMCNC: 28.4 PG
MCHC RBC-ENTMCNC: 31.8 GM/DL
MCV RBC AUTO: 89.2 FL
MONOCYTES # BLD AUTO: 0.51 K/UL
MONOCYTES NFR BLD AUTO: 8.6 %
NEUTROPHILS # BLD AUTO: 2.98 K/UL
NEUTROPHILS NFR BLD AUTO: 50.5 %
PLATELET # BLD AUTO: 259 K/UL
POTASSIUM SERPL-SCNC: 5.1 MMOL/L
PROT SERPL-MCNC: 7.1 G/DL
RBC # BLD: 5.18 M/UL
RBC # FLD: 13.1 %
SARS-COV-2 IGG SERPL IA-ACNC: 0.08 INDEX
SARS-COV-2 IGG SERPL QL IA: NEGATIVE
SODIUM SERPL-SCNC: 139 MMOL/L
WBC # FLD AUTO: 5.91 K/UL

## 2020-11-12 ENCOUNTER — LABORATORY RESULT (OUTPATIENT)
Age: 40
End: 2020-11-12

## 2020-11-15 ENCOUNTER — TRANSCRIPTION ENCOUNTER (OUTPATIENT)
Age: 40
End: 2020-11-15

## 2020-11-16 ENCOUNTER — RESULT REVIEW (OUTPATIENT)
Age: 40
End: 2020-11-16

## 2020-11-16 ENCOUNTER — APPOINTMENT (OUTPATIENT)
Dept: ORTHOPEDIC SURGERY | Facility: AMBULATORY SURGERY CENTER | Age: 40
End: 2020-11-16

## 2020-11-16 ENCOUNTER — OUTPATIENT (OUTPATIENT)
Dept: OUTPATIENT SERVICES | Facility: HOSPITAL | Age: 40
LOS: 1 days | Discharge: HOME | End: 2020-11-16
Payer: MEDICAID

## 2020-11-16 DIAGNOSIS — Z98.89 OTHER SPECIFIED POSTPROCEDURAL STATES: Chronic | ICD-10-CM

## 2020-11-16 DIAGNOSIS — K08.409 PARTIAL LOSS OF TEETH, UNSPECIFIED CAUSE, UNSPECIFIED CLASS: Chronic | ICD-10-CM

## 2020-11-16 DIAGNOSIS — Z90.49 ACQUIRED ABSENCE OF OTHER SPECIFIED PARTS OF DIGESTIVE TRACT: Chronic | ICD-10-CM

## 2020-11-16 DIAGNOSIS — Z90.5 ACQUIRED ABSENCE OF KIDNEY: Chronic | ICD-10-CM

## 2020-11-16 DIAGNOSIS — Q63.9 CONGENITAL MALFORMATION OF KIDNEY, UNSPECIFIED: Chronic | ICD-10-CM

## 2020-11-16 PROCEDURE — 88304 TISSUE EXAM BY PATHOLOGIST: CPT | Mod: 26

## 2020-11-16 PROCEDURE — 26160 REMOVE TENDON SHEATH LESION: CPT | Mod: F3

## 2020-11-24 ENCOUNTER — APPOINTMENT (OUTPATIENT)
Dept: ORTHOPEDIC SURGERY | Facility: CLINIC | Age: 40
End: 2020-11-24
Payer: MEDICAID

## 2020-11-24 VITALS — HEIGHT: 68 IN | BODY MASS INDEX: 22.13 KG/M2 | WEIGHT: 146 LBS | RESPIRATION RATE: 16 BRPM

## 2020-11-24 PROCEDURE — 99024 POSTOP FOLLOW-UP VISIT: CPT

## 2020-12-16 PROBLEM — Z87.440 HISTORY OF URINARY TRACT INFECTION: Status: RESOLVED | Noted: 2018-10-03 | Resolved: 2020-12-16

## 2020-12-18 ENCOUNTER — TRANSCRIPTION ENCOUNTER (OUTPATIENT)
Age: 40
End: 2020-12-18

## 2020-12-18 ENCOUNTER — RX RENEWAL (OUTPATIENT)
Age: 40
End: 2020-12-18

## 2020-12-22 ENCOUNTER — APPOINTMENT (OUTPATIENT)
Dept: RHEUMATOLOGY | Facility: CLINIC | Age: 40
End: 2020-12-22
Payer: MEDICAID

## 2020-12-22 VITALS
TEMPERATURE: 97.8 F | WEIGHT: 146 LBS | HEIGHT: 68 IN | BODY MASS INDEX: 22.13 KG/M2 | OXYGEN SATURATION: 99 % | DIASTOLIC BLOOD PRESSURE: 66 MMHG | HEART RATE: 86 BPM | SYSTOLIC BLOOD PRESSURE: 99 MMHG

## 2020-12-22 PROCEDURE — 99214 OFFICE O/P EST MOD 30 MIN: CPT

## 2020-12-22 PROCEDURE — 99072 ADDL SUPL MATRL&STAF TM PHE: CPT

## 2020-12-23 PROBLEM — M65.342 TRIGGER RING FINGER OF LEFT HAND: Status: ACTIVE | Noted: 2020-11-24

## 2020-12-23 LAB
ALBUMIN SERPL ELPH-MCNC: 4.2 G/DL
ALP BLD-CCNC: 45 U/L
ALT SERPL-CCNC: 11 U/L
ANION GAP SERPL CALC-SCNC: 12 MMOL/L
AST SERPL-CCNC: 16 U/L
BASOPHILS # BLD AUTO: 0.05 K/UL
BASOPHILS NFR BLD AUTO: 0.8 %
BILIRUB SERPL-MCNC: 0.2 MG/DL
BUN SERPL-MCNC: 12 MG/DL
C3 SERPL-MCNC: 85 MG/DL
C4 SERPL-MCNC: 14 MG/DL
CALCIUM SERPL-MCNC: 9.5 MG/DL
CHLORIDE SERPL-SCNC: 104 MMOL/L
CO2 SERPL-SCNC: 23 MMOL/L
CREAT SERPL-MCNC: 1.09 MG/DL
CRP SERPL-MCNC: <0.1 MG/DL
EOSINOPHIL # BLD AUTO: 0.15 K/UL
EOSINOPHIL NFR BLD AUTO: 2.3 %
ERYTHROCYTE [SEDIMENTATION RATE] IN BLOOD BY WESTERGREN METHOD: 4 MM/HR
ESTIMATED AVERAGE GLUCOSE: 103 MG/DL
GLUCOSE SERPL-MCNC: 91 MG/DL
HBA1C MFR BLD HPLC: 5.2 %
HCT VFR BLD CALC: 43 %
HGB BLD-MCNC: 13.8 G/DL
IGA SER QL IEP: 310 MG/DL
IGG SER QL IEP: 1056 MG/DL
IGM SER QL IEP: 159 MG/DL
IMM GRANULOCYTES NFR BLD AUTO: 0.3 %
LYMPHOCYTES # BLD AUTO: 2.52 K/UL
LYMPHOCYTES NFR BLD AUTO: 38 %
MAN DIFF?: NORMAL
MCHC RBC-ENTMCNC: 28.5 PG
MCHC RBC-ENTMCNC: 32.1 GM/DL
MCV RBC AUTO: 88.7 FL
MONOCYTES # BLD AUTO: 0.59 K/UL
MONOCYTES NFR BLD AUTO: 8.9 %
NEUTROPHILS # BLD AUTO: 3.3 K/UL
NEUTROPHILS NFR BLD AUTO: 49.7 %
PLATELET # BLD AUTO: 265 K/UL
POTASSIUM SERPL-SCNC: 4.9 MMOL/L
PROT SERPL-MCNC: 6.7 G/DL
RBC # BLD: 4.85 M/UL
RBC # FLD: 12.2 %
RHEUMATOID FACT SER QL: <10 IU/ML
SODIUM SERPL-SCNC: 140 MMOL/L
TSH SERPL-ACNC: 1.35 UIU/ML
WBC # FLD AUTO: 6.63 K/UL

## 2020-12-23 NOTE — HISTORY OF PRESENT ILLNESS
[___ Month(s) Ago] : [unfilled] month(s) ago [FreeTextEntry1] : Office Visit 11/5/19:\par Recurrence of tear on L shoulder - Bilateral labral tears \par Saw ortho for arthroscopic repair, send out pathology which is still pending, surgeon thinks that this is c/w RA per patient. Starting to feel better, denies swelling currently. \par Superficial thrombophlebitis - from IV site of surgery \par No sicca today but very fatigued\par Mild arthralgias, none today \par Plan: Start Plaquenil for fatigue / arthralgias\par Will await surgical pathology from L shoulder surgery\par Check labs for disease activity\par \par Office Visit 3/12/19:\par Initially patient noted to have +serologies for Sjogren's syndrome, however she denied any sicca symptoms.\par Patient had surgery for her endometriosis Jan 25 and following this she developed xeropthalmia. \par Diagnosed by ophtho with dry eyes a few weeks ago - told she could have Sj?gren's based on her ocular findings. Noted corneal damage and blepharitis. She was given prednisone drops, artificial tears with relief. Has not tried restasis yet.\par Her mouth is not dry. Does not need water to swallow food. She drinks a lot of water though. \par Feels very tired right now and notes she is sleeping more often.\par Mild body aches, no arthralgias or pain in small joints of hands \par No vaginal dryness \par Plan: Patient now with dry eyes, diagnosed by ophtho by schirmer test. \par She denies additional sicca symptoms but does note fatigue since her surgery. It is possible that surgery has led to the onset of her autoimmune condition. We discussed her options, including symptomatic treatment for sicca, evaluation with a lip biopsy and plaquenil. She chose to continue symptomatic treatment with eye drops for now. Will continue to monitor for additional symptoms, and consider Plaquenil should her fatigue persist. \par A lip biopsy would ultimately be very helpful to definitively diagnose her, but she is tried from recent surgery/doctors appointments and thus we will hold off for now. \par \par Initial Visit: \par Patient seen and examined with Dr. Solorzano. \par Agree with history, physical exam, assessment and plan as described above with exception of the following additions and changes:\par 28 year old presents for evaluation of MAX 1:640. \par Patient with severe endometriosis which likely explains this particularly as she has no other symptoms or signs suggesting an autoimmune condition. \par Will send serologic work up today. \par

## 2020-12-24 ENCOUNTER — APPOINTMENT (OUTPATIENT)
Dept: ORTHOPEDIC SURGERY | Facility: CLINIC | Age: 40
End: 2020-12-24
Payer: MEDICAID

## 2020-12-24 DIAGNOSIS — M65.342 TRIGGER FINGER, LEFT RING FINGER: ICD-10-CM

## 2020-12-24 PROCEDURE — 99024 POSTOP FOLLOW-UP VISIT: CPT

## 2021-01-05 ENCOUNTER — TRANSCRIPTION ENCOUNTER (OUTPATIENT)
Age: 41
End: 2021-01-05

## 2021-01-08 ENCOUNTER — TRANSCRIPTION ENCOUNTER (OUTPATIENT)
Age: 41
End: 2021-01-08

## 2021-01-08 ENCOUNTER — APPOINTMENT (OUTPATIENT)
Dept: RHEUMATOLOGY | Facility: CLINIC | Age: 41
End: 2021-01-08
Payer: MEDICAID

## 2021-01-08 PROCEDURE — 36415 COLL VENOUS BLD VENIPUNCTURE: CPT

## 2021-01-08 PROCEDURE — 99072 ADDL SUPL MATRL&STAF TM PHE: CPT

## 2021-01-11 LAB
ALBUMIN SERPL ELPH-MCNC: 4.3 G/DL
ALP BLD-CCNC: 45 U/L
ALT SERPL-CCNC: 14 U/L
ANION GAP SERPL CALC-SCNC: 17 MMOL/L
AST SERPL-CCNC: 20 U/L
BASOPHILS # BLD AUTO: 0.04 K/UL
BASOPHILS NFR BLD AUTO: 0.6 %
BILIRUB SERPL-MCNC: 0.5 MG/DL
BUN SERPL-MCNC: 9 MG/DL
CALCIUM SERPL-MCNC: 9.8 MG/DL
CHLORIDE SERPL-SCNC: 103 MMOL/L
CO2 SERPL-SCNC: 21 MMOL/L
CREAT SERPL-MCNC: 1.17 MG/DL
CRP SERPL-MCNC: <0.1 MG/DL
EOSINOPHIL # BLD AUTO: 0.17 K/UL
EOSINOPHIL NFR BLD AUTO: 2.6 %
ERYTHROCYTE [SEDIMENTATION RATE] IN BLOOD BY WESTERGREN METHOD: 5 MM/HR
GLUCOSE SERPL-MCNC: 84 MG/DL
HCT VFR BLD CALC: 45.9 %
HGB BLD-MCNC: 14.6 G/DL
IMM GRANULOCYTES NFR BLD AUTO: 0.2 %
LYMPHOCYTES # BLD AUTO: 1.98 K/UL
LYMPHOCYTES NFR BLD AUTO: 30 %
MAN DIFF?: NORMAL
MCHC RBC-ENTMCNC: 28.6 PG
MCHC RBC-ENTMCNC: 31.8 GM/DL
MCV RBC AUTO: 89.8 FL
MONOCYTES # BLD AUTO: 0.53 K/UL
MONOCYTES NFR BLD AUTO: 8 %
NEUTROPHILS # BLD AUTO: 3.87 K/UL
NEUTROPHILS NFR BLD AUTO: 58.6 %
PLATELET # BLD AUTO: 186 K/UL
POTASSIUM SERPL-SCNC: 4.5 MMOL/L
PROT SERPL-MCNC: 6.8 G/DL
RBC # BLD: 5.11 M/UL
RBC # FLD: 12.6 %
SODIUM SERPL-SCNC: 141 MMOL/L
WBC # FLD AUTO: 6.6 K/UL

## 2021-01-15 ENCOUNTER — TRANSCRIPTION ENCOUNTER (OUTPATIENT)
Age: 41
End: 2021-01-15

## 2021-01-21 ENCOUNTER — APPOINTMENT (OUTPATIENT)
Dept: RHEUMATOLOGY | Facility: CLINIC | Age: 41
End: 2021-01-21
Payer: MEDICAID

## 2021-01-21 VITALS
HEIGHT: 68 IN | WEIGHT: 146 LBS | SYSTOLIC BLOOD PRESSURE: 104 MMHG | TEMPERATURE: 96.7 F | BODY MASS INDEX: 22.13 KG/M2 | DIASTOLIC BLOOD PRESSURE: 69 MMHG | HEART RATE: 85 BPM | OXYGEN SATURATION: 98 %

## 2021-01-21 DIAGNOSIS — D70.9 NEUTROPENIA, UNSPECIFIED: ICD-10-CM

## 2021-01-21 DIAGNOSIS — R50.81 NEUTROPENIA, UNSPECIFIED: ICD-10-CM

## 2021-01-21 PROCEDURE — 99213 OFFICE O/P EST LOW 20 MIN: CPT | Mod: 25

## 2021-01-21 PROCEDURE — 99072 ADDL SUPL MATRL&STAF TM PHE: CPT

## 2021-01-21 RX ORDER — HYDROXYCHLOROQUINE SULFATE 200 MG/1
200 TABLET, FILM COATED ORAL TWICE DAILY
Qty: 60 | Refills: 1 | Status: DISCONTINUED | COMMUNITY
Start: 2019-11-05 | End: 2021-01-21

## 2021-01-21 NOTE — HISTORY OF PRESENT ILLNESS
[___ Month(s) Ago] : [unfilled] month(s) ago [FreeTextEntry1] : \par Office Visit 12/22/20:\par Shoulder surgery pathology revealed both acute and chronic inflammatory changes \par Recently had a retinacular cyst removed. This exacerbated a dupuytren's which is bothering her. \par Minimal relief with Plaquenil, still with pain in hands. Pain is in palmar aspects and dorsal digits. \par Plan: Ongoing joint pain, exam with notable tendonitis also, likely 2/2 sjogren's\par Medrol dosepack for relief of pain \par Low dose MTX for tendonitis / syonvitis a/w sjogren's \par Check serologies today \par \par Office Visit 11/5/19:\par Recurrence of tear on L shoulder - Bilateral labral tears \par Saw ortho for arthroscopic repair, send out pathology which is still pending, surgeon thinks that this is c/w RA per patient. Starting to feel better, denies swelling currently. \par Superficial thrombophlebitis - from IV site of surgery \par No sicca today but very fatigued\par Mild arthralgias, none today \par Plan: Start Plaquenil for fatigue / arthralgias\par Will await surgical pathology from L shoulder surgery\par Check labs for disease activity\par \par Office Visit 3/12/19:\par Initially patient noted to have +serologies for Sjogren's syndrome, however she denied any sicca symptoms.\par Patient had surgery for her endometriosis Jan 25 and following this she developed xeropthalmia. \par Diagnosed by ophtho with dry eyes a few weeks ago - told she could have Sj?gren's based on her ocular findings. Noted corneal damage and blepharitis. She was given prednisone drops, artificial tears with relief. Has not tried restasis yet.\par Her mouth is not dry. Does not need water to swallow food. She drinks a lot of water though. \par Feels very tired right now and notes she is sleeping more often.\par Mild body aches, no arthralgias or pain in small joints of hands \par No vaginal dryness \par Plan: Patient now with dry eyes, diagnosed by ophtho by schirmer test. \par She denies additional sicca symptoms but does note fatigue since her surgery. It is possible that surgery has led to the onset of her autoimmune condition. We discussed her options, including symptomatic treatment for sicca, evaluation with a lip biopsy and plaquenil. She chose to continue symptomatic treatment with eye drops for now. Will continue to monitor for additional symptoms, and consider Plaquenil should her fatigue persist. \par A lip biopsy would ultimately be very helpful to definitively diagnose her, but she is tried from recent surgery/doctors appointments and thus we will hold off for now. \par \par Initial Visit: \par Patient seen and examined with Dr. Solorzano. \par Agree with history, physical exam, assessment and plan as described above with exception of the following additions and changes:\par 28 year old presents for evaluation of MAX 1:640. \par Patient with severe endometriosis which likely explains this particularly as she has no other symptoms or signs suggesting an autoimmune condition. \par Will send serologic work up today. \par

## 2021-01-21 NOTE — ASSESSMENT
[FreeTextEntry1] : 40 year old presents for follow up evaluation of Sjogren's (dry eyes, MAX 1:640. SSa/SSb positive)\par Continue MTX but will increase to 15mg weekly\par Discussed her use of Tylonel - she takes this 4-5 times a week (325mg) for her endometriosis pain. She should follow up with her gynecologist to discussed this. Would like her to not have to take this so regularly. \par Check labs.

## 2021-01-21 NOTE — PHYSICAL EXAM
[General Appearance - Alert] : alert [General Appearance - Well Nourished] : well nourished [General Appearance - In No Acute Distress] : in no acute distress <<-----Click on this checkbox to enter Procedure [General Appearance - Well Developed] : well developed Hip replacement  01/11/2018  Right anterior total hip replacement  Active  [id number] [Extraocular Movements] : extraocular movements were intact [Outer Ear] : the ears and nose were normal in appearance [Oropharynx] : the oropharynx was normal [Nasal Cavity] : the nasal mucosa and septum were normal [Neck Appearance] : the appearance of the neck was normal [Respiration, Rhythm And Depth] : normal respiratory rhythm and effort [Auscultation Breath Sounds / Voice Sounds] : lungs were clear to auscultation bilaterally [Heart Rate And Rhythm] : heart rate was normal and rhythm regular [Heart Sounds] : normal S1 and S2 [Heart Sounds Gallop] : no gallops [Murmurs] : no murmurs [Heart Sounds Pericardial Friction Rub] : no pericardial rub [Edema] : there was no peripheral edema [Abdomen Soft] : soft [No Spinal Tenderness] : no spinal tenderness [Abdomen Tenderness] : non-tender [Abnormal Walk] : normal gait [Musculoskeletal - Swelling] : no joint swelling seen [Motor Tone] : muscle strength and tone were normal [] : no rash [Skin Lesions] : no skin lesions [No Focal Deficits] : no focal deficits [Cranial Nerves] : cranial nerves 2-12 were intact

## 2021-01-22 LAB
BASOPHILS # BLD AUTO: 0.03 K/UL
BASOPHILS NFR BLD AUTO: 0.7 %
EOSINOPHIL # BLD AUTO: 0.13 K/UL
EOSINOPHIL NFR BLD AUTO: 2.8 %
ERYTHROCYTE [SEDIMENTATION RATE] IN BLOOD BY WESTERGREN METHOD: 7 MM/HR
HCT VFR BLD CALC: 43.5 %
HGB BLD-MCNC: 13.9 G/DL
IMM GRANULOCYTES NFR BLD AUTO: 0.4 %
LYMPHOCYTES # BLD AUTO: 1.61 K/UL
LYMPHOCYTES NFR BLD AUTO: 35.2 %
MAN DIFF?: NORMAL
MCHC RBC-ENTMCNC: 28.3 PG
MCHC RBC-ENTMCNC: 32 GM/DL
MCV RBC AUTO: 88.6 FL
MONOCYTES # BLD AUTO: 0.44 K/UL
MONOCYTES NFR BLD AUTO: 9.6 %
NEUTROPHILS # BLD AUTO: 2.35 K/UL
NEUTROPHILS NFR BLD AUTO: 51.3 %
PLATELET # BLD AUTO: 272 K/UL
RBC # BLD: 4.91 M/UL
RBC # FLD: 13 %
WBC # FLD AUTO: 4.58 K/UL

## 2021-01-25 LAB
ALBUMIN SERPL ELPH-MCNC: 4.5 G/DL
ALP BLD-CCNC: 46 U/L
ALT SERPL-CCNC: 16 U/L
ANION GAP SERPL CALC-SCNC: 17 MMOL/L
AST SERPL-CCNC: 18 U/L
BILIRUB SERPL-MCNC: 0.2 MG/DL
BUN SERPL-MCNC: 8 MG/DL
C3 SERPL-MCNC: 98 MG/DL
C4 SERPL-MCNC: 21 MG/DL
CALCIUM SERPL-MCNC: 9.7 MG/DL
CHLORIDE SERPL-SCNC: 106 MMOL/L
CO2 SERPL-SCNC: 18 MMOL/L
CREAT SERPL-MCNC: 1.18 MG/DL
CRP SERPL-MCNC: <0.1 MG/DL
GLUCOSE SERPL-MCNC: 91 MG/DL
IGA SER QL IEP: 336 MG/DL
IGG SER QL IEP: 1152 MG/DL
IGM SER QL IEP: 165 MG/DL
POTASSIUM SERPL-SCNC: 4.8 MMOL/L
PROT SERPL-MCNC: 7 G/DL
SODIUM SERPL-SCNC: 141 MMOL/L

## 2021-02-11 ENCOUNTER — TRANSCRIPTION ENCOUNTER (OUTPATIENT)
Age: 41
End: 2021-02-11

## 2021-02-19 ENCOUNTER — TRANSCRIPTION ENCOUNTER (OUTPATIENT)
Age: 41
End: 2021-02-19

## 2021-02-19 ENCOUNTER — RX RENEWAL (OUTPATIENT)
Age: 41
End: 2021-02-19

## 2021-02-24 ENCOUNTER — TRANSCRIPTION ENCOUNTER (OUTPATIENT)
Age: 41
End: 2021-02-24

## 2021-03-18 ENCOUNTER — TRANSCRIPTION ENCOUNTER (OUTPATIENT)
Age: 41
End: 2021-03-18

## 2021-03-24 ENCOUNTER — TRANSCRIPTION ENCOUNTER (OUTPATIENT)
Age: 41
End: 2021-03-24

## 2021-03-25 ENCOUNTER — APPOINTMENT (OUTPATIENT)
Dept: COLORECTAL SURGERY | Facility: CLINIC | Age: 41
End: 2021-03-25
Payer: MEDICAID

## 2021-03-25 VITALS
HEART RATE: 97 BPM | HEIGHT: 68 IN | TEMPERATURE: 98.2 F | SYSTOLIC BLOOD PRESSURE: 120 MMHG | BODY MASS INDEX: 22.13 KG/M2 | WEIGHT: 146 LBS | DIASTOLIC BLOOD PRESSURE: 85 MMHG

## 2021-03-25 DIAGNOSIS — Z83.3 FAMILY HISTORY OF DIABETES MELLITUS: ICD-10-CM

## 2021-03-25 DIAGNOSIS — K59.09 OTHER CONSTIPATION: ICD-10-CM

## 2021-03-25 PROCEDURE — 99072 ADDL SUPL MATRL&STAF TM PHE: CPT

## 2021-03-25 PROCEDURE — 99203 OFFICE O/P NEW LOW 30 MIN: CPT | Mod: 25

## 2021-03-25 PROCEDURE — 46600 DIAGNOSTIC ANOSCOPY SPX: CPT

## 2021-03-25 NOTE — REVIEW OF SYSTEMS
[Constipation] : constipation [Diarrhea] : diarrhea [Negative] : Heme/Lymph [FreeTextEntry4] : dizziness/lightheadednes [FreeTextEntry7] : blood per rectum, anorectal pain, anal itchiness, anorectal swelling [de-identified] : sleep problems, depression, anxiety [FreeTextEntry1] : headaches/migraines

## 2021-03-25 NOTE — HISTORY OF PRESENT ILLNESS
[FreeTextEntry1] : 41 y/o F presents for evaluation of possible hemorrhoids\par H/o stage IV endomitoses, Sjogren's syndrome, febrile neutropenia \par PSH of appendectomy, nephrectomy, laparoscopic excision of rectovaginal septum endometriosis and laparoscopic ileocecectomy 4/8/16 with Dr. Alexandre and Dr. Smart, laparoscopic excision of terminal ileal nodule and laparoscopic excision of sigmoid colon nodule on 1/25/19 with Dr. Alexandre and Dr. Smart, cystoscopy hydrodistention with Dr. Finkelstein on 8/11/20\par \par Has not seen Dr. Smart since last surgery. Followed regularly by GYN, Dr. Seth Finkelstein\par \par MRI Pelvis completed 10/16/18 at [facility] \par - deep endometriosis along the anterior, left and posterior lower uterine segment/cervix, partially surrounding the left ovary with adhesion between the sigmoid and lower uterine segment and suspected adhesion between the anterior lower uterine segment and the urinary bladder\par - a 5-6 mm hemorrhagic focus in the left ovary may represent an endometrioma or hemorrhagic cyst follicle  \par \par MRI of the pelvis with focus on the bladder completed in September at [facility] \par \par C/o anorectal discomfort and pain that last throughout the day and is worse with BM's for the last 7 years \par Reports several bumps near the anus that have increased in size in the last several years. She is uncertain if they are hemorrhoids vs. skin tag\par Admits to occasional itching and BRBPR on TP and in the bowl with most but not all BM's\par BH: BID\par BM's fluctuate between constipation and diarrhea\par Taking Colace 1 tab BID, except on days she has diarrhea. Taking a fiber supplement, was previously taking daily but is currently only taking sporadically currently \par Currently following a gluten free, dairy free diet. Reports adequate dietary fiber intake, states her diet is similar to low FODMAP currently. Currently drinking ~ 2L of water daily \par Last colonoscopy completed ~6 years ago with Dr. Alexandre, no abnormal findings per patient  \par No ASA last 7 days. Took Advil yesterday

## 2021-03-25 NOTE — PHYSICAL EXAM
[FreeTextEntry1] : Medical assistant present for duration of physical examination\par \par General no acute distress, alert and oriented\par Psych calm, pleasant demeanor, responding appropriately to questions\par Nonlabored breathing\par Ambulating without assistance\par Skin normal color and pigment, no visible lesions or rashes\par \par Anorectal Exam:\par Inspection no erythema, induration or fluctuance, no skin excoriation, posterior midline anal fissure with exposed sphincter muscle and associated anal tag/pile, right anterior soft small nonthrombosed external hemorrhoidal tag\par PERI nontender, no masses palpated, no blood on gloved finger\par \par Procedure: Anoscopy\par \par Pre procedure Diagnosis: anal fissure\par Post procedure Diagnosis: anal fissure\par Anesthesia: none\par Estimated blood loss: none\par Specimen: none\par Complications: none\par \par Consent obtained. Anoscopy was performed by passing a lighted anoscope with lubricant jelly into the anal canal and the entire anal mucosal surface was inspected. Findings included posterior midline anal fissure, noninflamed internal hemorrhoid tissue, no visible masses or lesions\par \par Patient tolerated examination and procedure well.\par \par \par

## 2021-03-25 NOTE — ASSESSMENT
[FreeTextEntry1] : Exam findings and diagnosis were discussed at length with patient. \par Recommendations including increased fiber intake, adequate daily hydration, stool softeners as needed, and sitz baths as needed and after bowel movements were discussed.\par On low FODMAP diet, at advice of her accupuncturist. Has not seen GI. Consider GI evaluation if continues to have fluctuating bowel habits between constipation and diarrhea despite dietary changes and holistic care.\par Medical management, such diltiazem cream TID, was discussed.\par Patient understands that surgical options do exist for chronic fissures refractory to medical management, however we discussed a trial of medical management first.\par Patient will follow in 6 weeks or sooner as needed if symptoms persist or progress\par All questions answered, patient expressed understanding and is agreeable to this plan.\par

## 2021-04-01 ENCOUNTER — TRANSCRIPTION ENCOUNTER (OUTPATIENT)
Age: 41
End: 2021-04-01

## 2021-04-12 ENCOUNTER — TRANSCRIPTION ENCOUNTER (OUTPATIENT)
Age: 41
End: 2021-04-12

## 2021-04-13 ENCOUNTER — TRANSCRIPTION ENCOUNTER (OUTPATIENT)
Age: 41
End: 2021-04-13

## 2021-04-28 ENCOUNTER — TRANSCRIPTION ENCOUNTER (OUTPATIENT)
Age: 41
End: 2021-04-28

## 2021-05-06 ENCOUNTER — APPOINTMENT (OUTPATIENT)
Dept: COLORECTAL SURGERY | Facility: CLINIC | Age: 41
End: 2021-05-06
Payer: MEDICAID

## 2021-05-06 VITALS
HEART RATE: 89 BPM | SYSTOLIC BLOOD PRESSURE: 105 MMHG | DIASTOLIC BLOOD PRESSURE: 74 MMHG | TEMPERATURE: 98 F | WEIGHT: 149 LBS | BODY MASS INDEX: 22.58 KG/M2 | HEIGHT: 68 IN

## 2021-05-06 DIAGNOSIS — K60.2 ANAL FISSURE, UNSPECIFIED: ICD-10-CM

## 2021-05-06 PROCEDURE — 99072 ADDL SUPL MATRL&STAF TM PHE: CPT

## 2021-05-06 PROCEDURE — 46600 DIAGNOSTIC ANOSCOPY SPX: CPT

## 2021-05-06 NOTE — PHYSICAL EXAM
[FreeTextEntry1] : Medical assistant present for duration of physical examination\par \par General no acute distress, alert and oriented\par Psych calm, pleasant \par Nonlabored breathing\par Ambulating without assistance\par Skin normal color and pigment, no visible lesions or rashes\par \par Anorectal Exam:\par Inspection no erythema, induration or fluctuance, no skin excoriation, posterior midline anal tag - previoulsy noted anal fissure resolved, right anterior soft small nonthrombosed external hemorrhoidal tag\par PERI nontender, no masses palpated, no blood on gloved finger\par \par Procedure: Anoscopy\par \par Pre procedure Diagnosis: h/o anal fissure\par Post procedure Diagnosis: h/o anal fissure\par Anesthesia: none\par Estimated blood loss: none\par Specimen: none\par Complications: none\par \par Consent obtained. Anoscopy was performed by passing a lighted anoscope with lubricant jelly into the anal canal and the entire anal mucosal surface was inspected. Findings included previously seen anal fissure has resolved, noninflamed internal hemorrhoid tissue\par \par Patient tolerated examination and procedure well.\par \par \par

## 2021-05-06 NOTE — ASSESSMENT
[FreeTextEntry1] : Exam findings and diagnosis were discussed at length with patient.\par Anal fissure resolved with supportive care\par If fissure symptoms return, advised patient to restart diltiazem ointment and return for followup evaluation.\par All questions were answered, patient expressed understanding, and is agreeable to this plan.

## 2021-05-06 NOTE — HISTORY OF PRESENT ILLNESS
[FreeTextEntry1] : 40 y/o F presents for anal fissue\par H/o stage IV endomitoses, Sjogren's syndrome, febrile neutropenia \par PSH of appendectomy, nephrectomy, laparoscopic excision of rectovaginal septum endometriosis and laparoscopic ileocecectomy 4/8/16 with Dr. Alexandre and Dr. Smart, laparoscopic excision of terminal ileal nodule and laparoscopic excision of sigmoid colon nodule on 1/25/19 with Dr. Alexandre and Dr. Smart, cystoscopy hydrodistention with Dr. Finkelstein on 8/11/20\par \par Last seen in the office on 3/25/21. Posterior midline anal fissure with exposed sphincter muscle and associated anal tag, right anterior soft small non thrombosed external hemorrhoidal tag, and noninflamed internal hemorrhoids seen on exam. Advised to begin using Diltiazem ointment TID, currently using 2-3 times daily with moderate improvement in symptoms\par \par Reports intermittent episodes sharp pain with BM's that resolves quickly, now only occurring several times per week. Reports rare episodes of mild itching \par Denies BRBPR\par No sitz baths \par BH:BID\par Denies constipation, diarrhea or straining\par Continues to use Colace 1 tab BID \par Following Low FODMAP diet\par Last colonoscopy completed ~6 years ago with Dr. Alexandre, no abnormal findings per patient \par Took Excedrin this morning. Has used other NSAIDs throughout the last 7 days 2/2 pain since switching to a progesterone only OC

## 2021-05-07 ENCOUNTER — RX RENEWAL (OUTPATIENT)
Age: 41
End: 2021-05-07

## 2021-05-27 ENCOUNTER — APPOINTMENT (OUTPATIENT)
Dept: RHEUMATOLOGY | Facility: CLINIC | Age: 41
End: 2021-05-27
Payer: MEDICAID

## 2021-05-27 VITALS
WEIGHT: 148 LBS | HEIGHT: 68 IN | TEMPERATURE: 97.8 F | OXYGEN SATURATION: 98 % | DIASTOLIC BLOOD PRESSURE: 67 MMHG | SYSTOLIC BLOOD PRESSURE: 101 MMHG | HEART RATE: 84 BPM | BODY MASS INDEX: 22.43 KG/M2

## 2021-05-27 PROCEDURE — 99215 OFFICE O/P EST HI 40 MIN: CPT | Mod: 25

## 2021-05-27 NOTE — PHYSICAL EXAM
[General Appearance - Alert] : alert [General Appearance - In No Acute Distress] : in no acute distress [General Appearance - Well Nourished] : well nourished [General Appearance - Well Developed] : well developed [Outer Ear] : the ears and nose were normal in appearance [Nasal Cavity] : the nasal mucosa and septum were normal [Neck Appearance] : the appearance of the neck was normal [Respiration, Rhythm And Depth] : normal respiratory rhythm and effort [Auscultation Breath Sounds / Voice Sounds] : lungs were clear to auscultation bilaterally [Heart Rate And Rhythm] : heart rate was normal and rhythm regular [Heart Sounds] : normal S1 and S2 [Heart Sounds Gallop] : no gallops [Murmurs] : no murmurs [Heart Sounds Pericardial Friction Rub] : no pericardial rub [Edema] : there was no peripheral edema [Abdomen Soft] : soft [Abdomen Tenderness] : non-tender [No Spinal Tenderness] : no spinal tenderness [Abnormal Walk] : normal gait [Musculoskeletal - Swelling] : no joint swelling seen [Motor Tone] : muscle strength and tone were normal [] : no rash [Skin Lesions] : no skin lesions [No Focal Deficits] : no focal deficits [Sclera] : the sclera and conjunctiva were normal [Examination Of The Oral Cavity] : the lips and gums were normal [Cervical Lymph Nodes Enlarged Posterior Bilaterally] : posterior cervical [Cervical Lymph Nodes Enlarged Anterior Bilaterally] : anterior cervical [Supraclavicular Lymph Nodes Enlarged Bilaterally] : supraclavicular [Oriented To Time, Place, And Person] : oriented to person, place, and time [Impaired Insight] : insight and judgment were intact [FreeTextEntry1] : NO evidence of tenderness or synovitis of any joints

## 2021-05-27 NOTE — REVIEW OF SYSTEMS
[Dizziness] : dizziness [Dry Eyes] : dryness of the eyes [As Noted in HPI] : as noted in HPI [Negative] : Musculoskeletal [Fever] : no fever [Chills] : no chills [Feeling Poorly] : not feeling poorly [Feeling Tired] : not feeling tired [Eye Pain] : no eye pain [Red Eyes] : eyes not red [Eyesight Problems] : no eyesight problems [Discharge From Eyes] : no purulent discharge from the eyes [Chest Pain] : no chest pain [Lower Ext Edema] : no lower extremity edema [Muscle Weakness] : no muscle weakness [Easy Bleeding] : no tendency for easy bleeding [Swollen Glands] : no swollen glands [Swollen Glands In The Neck] : no swollen glands in the neck

## 2021-05-27 NOTE — ASSESSMENT
[FreeTextEntry1] : 41 year old  female has very complicated  stage IV endomitoses, febrile neutropenia \par PSH of appendectomy, nephrectomy, laparoscopic excision of rectovaginal septum endometriosis and laparoscopic ileocecectomy 4/8/16,  laparoscopic excision of terminal ileal nodule and laparoscopic excision of sigmoid colon nodule on 1/25/19 cystoscopy hydrodistention. \par She follows with Rheumatology for  Sjogren's (dry eyes, MAX 1:640. SSa/SSb both strongly positive)\par She has not done lip biopsy and diagnoses made clinically. \par For past 18 months she has been treated for recurrent UTI with antibiotics, takes nitrofurantoin and \par currently on cipro \par denies MSK symptoms no joint pain or swelling \par MRI b/l hip reviewed, no evidence of synovitis , b/l  labral tear noted - she will follow up with ortho \par \par Pt on plaquenil 200mg BID ( not done follow up eye exam)- reminded about it today \par Given recurrent infections( UTI few times a month) and no MSK manifestation will start tapering down MTX- she can take 7.5mg from 10mg and if recurrence continues will stop MTX and watch. \par For hair thinning: double up the folate ( 2tab a day)\par Check labs today, AVISE today- drawn in office . \par Possible peripheral neuropathy: will refer to Dr. Koroma for EMG, start gabapentin 200mg at bedtime and if tolerates can gradually increase the dose. \par \par f/u in 3 months \par \par

## 2021-06-01 LAB
ALBUMIN MFR SERPL ELPH: 58.2 %
ALBUMIN SERPL ELPH-MCNC: 4.7 G/DL
ALBUMIN SERPL-MCNC: 4.4 G/DL
ALBUMIN/GLOB SERPL: 1.4 RATIO
ALP BLD-CCNC: 53 U/L
ALPHA1 GLOB MFR SERPL ELPH: 3.5 %
ALPHA1 GLOB SERPL ELPH-MCNC: 0.3 G/DL
ALPHA2 GLOB MFR SERPL ELPH: 7.6 %
ALPHA2 GLOB SERPL ELPH-MCNC: 0.6 G/DL
ALT SERPL-CCNC: 15 U/L
ANION GAP SERPL CALC-SCNC: 11 MMOL/L
AST SERPL-CCNC: 17 U/L
B-GLOBULIN MFR SERPL ELPH: 12.3 %
B-GLOBULIN SERPL ELPH-MCNC: 0.9 G/DL
BASOPHILS # BLD AUTO: 0.05 K/UL
BASOPHILS NFR BLD AUTO: 0.6 %
BILIRUB SERPL-MCNC: 0.5 MG/DL
BUN SERPL-MCNC: 16 MG/DL
CALCIUM SERPL-MCNC: 9.7 MG/DL
CHLORIDE SERPL-SCNC: 104 MMOL/L
CO2 SERPL-SCNC: 24 MMOL/L
CREAT SERPL-MCNC: 1.14 MG/DL
CRP SERPL-MCNC: <3 MG/L
DEPRECATED KAPPA LC FREE/LAMBDA SER: 1.23 RATIO
EOSINOPHIL # BLD AUTO: 0.16 K/UL
EOSINOPHIL NFR BLD AUTO: 1.9 %
ERYTHROCYTE [SEDIMENTATION RATE] IN BLOOD BY WESTERGREN METHOD: 6 MM/HR
GAMMA GLOB FLD ELPH-MCNC: 1.4 G/DL
GAMMA GLOB MFR SERPL ELPH: 18.4 %
GLUCOSE SERPL-MCNC: 100 MG/DL
HCT VFR BLD CALC: 49 %
HGB BLD-MCNC: 15.1 G/DL
IGA SER QL IEP: 374 MG/DL
IGG SER QL IEP: 1459 MG/DL
IGM SER QL IEP: 230 MG/DL
IMM GRANULOCYTES NFR BLD AUTO: 0.1 %
INTERPRETATION SERPL IEP-IMP: NORMAL
KAPPA LC CSF-MCNC: 2.23 MG/DL
KAPPA LC SERPL-MCNC: 2.74 MG/DL
LYMPHOCYTES # BLD AUTO: 2.47 K/UL
LYMPHOCYTES NFR BLD AUTO: 29.7 %
M PROTEIN SPEC IFE-MCNC: NORMAL
MAN DIFF?: NORMAL
MCHC RBC-ENTMCNC: 27.2 PG
MCHC RBC-ENTMCNC: 30.8 GM/DL
MCV RBC AUTO: 88.3 FL
MONOCYTES # BLD AUTO: 0.62 K/UL
MONOCYTES NFR BLD AUTO: 7.5 %
NEUTROPHILS # BLD AUTO: 5 K/UL
NEUTROPHILS NFR BLD AUTO: 60.2 %
PLATELET # BLD AUTO: 260 K/UL
POTASSIUM SERPL-SCNC: 4.9 MMOL/L
PROT SERPL-MCNC: 7.5 G/DL
PROT SERPL-MCNC: 7.5 G/DL
RBC # BLD: 5.55 M/UL
RBC # FLD: 13.2 %
SODIUM SERPL-SCNC: 139 MMOL/L
WBC # FLD AUTO: 8.31 K/UL

## 2021-06-04 ENCOUNTER — RX RENEWAL (OUTPATIENT)
Age: 41
End: 2021-06-04

## 2021-06-05 ENCOUNTER — RX RENEWAL (OUTPATIENT)
Age: 41
End: 2021-06-05

## 2021-06-07 LAB
MISCELLANEOUS TEST: NORMAL
PROC NAME: NORMAL

## 2021-06-14 ENCOUNTER — TRANSCRIPTION ENCOUNTER (OUTPATIENT)
Age: 41
End: 2021-06-14

## 2021-06-18 ENCOUNTER — APPOINTMENT (OUTPATIENT)
Dept: UROLOGY | Facility: CLINIC | Age: 41
End: 2021-06-18
Payer: MEDICAID

## 2021-06-18 ENCOUNTER — NON-APPOINTMENT (OUTPATIENT)
Age: 41
End: 2021-06-18

## 2021-06-18 VITALS
HEIGHT: 68 IN | DIASTOLIC BLOOD PRESSURE: 70 MMHG | WEIGHT: 148 LBS | BODY MASS INDEX: 22.43 KG/M2 | HEART RATE: 71 BPM | SYSTOLIC BLOOD PRESSURE: 118 MMHG | TEMPERATURE: 97.8 F

## 2021-06-18 DIAGNOSIS — Z00.00 ENCOUNTER FOR GENERAL ADULT MEDICAL EXAMINATION W/OUT ABNORMAL FINDINGS: ICD-10-CM

## 2021-06-18 DIAGNOSIS — Z90.5 ACQUIRED ABSENCE OF KIDNEY: ICD-10-CM

## 2021-06-18 PROCEDURE — 99204 OFFICE O/P NEW MOD 45 MIN: CPT

## 2021-06-21 ENCOUNTER — RESULT CHARGE (OUTPATIENT)
Age: 41
End: 2021-06-21

## 2021-06-21 LAB
APPEARANCE: CLEAR
BACTERIA UR CULT: NORMAL
BACTERIA: NEGATIVE
BILIRUBIN URINE: NEGATIVE
BLOOD URINE: NEGATIVE
COLOR: YELLOW
GLUCOSE QUALITATIVE U: NEGATIVE
HYALINE CASTS: 3 /LPF
KETONES URINE: NEGATIVE
LEUKOCYTE ESTERASE URINE: NEGATIVE
MICROSCOPIC-UA: NORMAL
NITRITE URINE: NEGATIVE
PH URINE: 6
PROTEIN URINE: NEGATIVE
RED BLOOD CELLS URINE: 1 /HPF
SPECIFIC GRAVITY URINE: 1.01
SQUAMOUS EPITHELIAL CELLS: 1 /HPF
UROBILINOGEN URINE: NORMAL
WHITE BLOOD CELLS URINE: 1 /HPF

## 2021-06-21 NOTE — HISTORY OF PRESENT ILLNESS
[FreeTextEntry1] : 40 y/o F presents for recurrent UTIs\par H/o stage IV endomitoses, Sjogren's syndrome, febrile neutropenia \par PSH of appendectomy, R nephrectomy (2010), laparoscopic excision of rectovaginal septum endometriosis and laparoscopic ileocecectomy 4/8/16 with Dr. Alexandre and Dr. Smart, laparoscopic excision of terminal ileal nodule and laparoscopic excision of sigmoid colon nodule on 1/25/19 with Dr. Alexandre and Dr. Smart, cystoscopy hydrodistention with Dr. Finkelstein on 8/11/20.\par \par She has had a documented UTI roughly ever 6-10 weeks over the last year.  Symptoms consistent of dysuria and urgency.   She denies any fevers or chills.  She does not think it is related to intercourse.  She has been on and off abx for the last few months.  She has been taking nitrofurantoin for suppression and despite this she gets breakthrough UTIs.  She has also been taking D-mannose and methenamine.  \par \par She received a culture result during our session together from a specimen sent earlier this week.  IT is again growing enterococcus it is sensitive to cipro.  She has not had upper tract imaging in several years

## 2021-06-21 NOTE — ASSESSMENT
[FreeTextEntry1] : 42 yo female with hx of stage IV endometriosis s/o multiple pelvic and bowel surgeries.  She is also s/p R nephrectomy 10 years ago for a chronically obstructed, non-functional kidney secondary to endometriosis.  She now has recurrent UTIs despite antibiotic suppression.  I will give her for cipro for the recently positive Ucx.  I would like to get upper tract imaging to r/o an etiology for recurrent UTIs.  We will repeat her cx today as well.  She will follow up after imaging results are available.

## 2021-06-21 NOTE — PHYSICAL EXAM
[General Appearance - Well Developed] : well developed [Heart Rate And Rhythm] : Heart rate and rhythm were normal [] : no respiratory distress [Abdomen Soft] : soft [Abdomen Tenderness] : non-tender [Costovertebral Angle Tenderness] : no ~M costovertebral angle tenderness [Normal Station and Gait] : the gait and station were normal for the patient's age [Skin Color & Pigmentation] : normal skin color and pigmentation [No Focal Deficits] : no focal deficits [Oriented To Time, Place, And Person] : oriented to person, place, and time

## 2021-06-22 LAB
BILIRUB UR QL STRIP: NORMAL
CLARITY UR: CLEAR
COLLECTION METHOD: NORMAL
GLUCOSE UR-MCNC: NORMAL
HCG UR QL: 0.2 EU/DL
HGB UR QL STRIP.AUTO: NORMAL
KETONES UR-MCNC: NORMAL
LEUKOCYTE ESTERASE UR QL STRIP: NORMAL
NITRITE UR QL STRIP: NORMAL
PH UR STRIP: 5.5
PROT UR STRIP-MCNC: NORMAL
SP GR UR STRIP: 1.01

## 2021-07-08 ENCOUNTER — TRANSCRIPTION ENCOUNTER (OUTPATIENT)
Age: 41
End: 2021-07-08

## 2021-07-09 ENCOUNTER — TRANSCRIPTION ENCOUNTER (OUTPATIENT)
Age: 41
End: 2021-07-09

## 2021-07-14 ENCOUNTER — APPOINTMENT (OUTPATIENT)
Dept: INTERNAL MEDICINE | Facility: CLINIC | Age: 41
End: 2021-07-14
Payer: MEDICAID

## 2021-07-14 VITALS
HEIGHT: 68 IN | SYSTOLIC BLOOD PRESSURE: 102 MMHG | HEART RATE: 77 BPM | TEMPERATURE: 97.8 F | BODY MASS INDEX: 22.43 KG/M2 | OXYGEN SATURATION: 98 % | DIASTOLIC BLOOD PRESSURE: 69 MMHG | WEIGHT: 148 LBS

## 2021-07-14 DIAGNOSIS — R07.89 OTHER CHEST PAIN: ICD-10-CM

## 2021-07-14 PROCEDURE — 99214 OFFICE O/P EST MOD 30 MIN: CPT | Mod: 25

## 2021-07-14 PROCEDURE — 93000 ELECTROCARDIOGRAM COMPLETE: CPT

## 2021-07-19 ENCOUNTER — APPOINTMENT (OUTPATIENT)
Dept: UROLOGY | Facility: CLINIC | Age: 41
End: 2021-07-19
Payer: MEDICAID

## 2021-07-19 VITALS
HEART RATE: 63 BPM | SYSTOLIC BLOOD PRESSURE: 90 MMHG | OXYGEN SATURATION: 98 % | DIASTOLIC BLOOD PRESSURE: 57 MMHG | TEMPERATURE: 97.7 F

## 2021-07-19 DIAGNOSIS — R10.2 PELVIC AND PERINEAL PAIN: ICD-10-CM

## 2021-07-19 DIAGNOSIS — G89.29 PELVIC AND PERINEAL PAIN: ICD-10-CM

## 2021-07-19 PROCEDURE — 99213 OFFICE O/P EST LOW 20 MIN: CPT

## 2021-07-19 NOTE — ASSESSMENT
[FreeTextEntry1] : 42 yo female with hx of endometriosis s/p extensive prior surgery no with recurrent UTIs and pelvic pain.  We will repeat her UCx today.  I encouraged her to try pelvic floor PT which may help with some of these chronic pain symptoms that are not likely UTI related.  She will continue with her daily medication regimen.

## 2021-07-19 NOTE — PHYSICAL EXAM
[General Appearance - Well Developed] : well developed [Normal Appearance] : normal appearance [Abdomen Soft] : soft [Abdomen Tenderness] : non-tender [Costovertebral Angle Tenderness] : no ~M costovertebral angle tenderness [Skin Color & Pigmentation] : normal skin color and pigmentation [Heart Rate And Rhythm] : Heart rate and rhythm were normal [] : no respiratory distress [Oriented To Time, Place, And Person] : oriented to person, place, and time [Normal Station and Gait] : the gait and station were normal for the patient's age [No Focal Deficits] : no focal deficits

## 2021-07-19 NOTE — HISTORY OF PRESENT ILLNESS
[FreeTextEntry1] : 6/18/21:\par \par 42 y/o F presents for recurrent UTIs\par H/o stage IV endomitoses, Sjogren's syndrome, febrile neutropenia \par PSH of appendectomy, R nephrectomy (2010), laparoscopic excision of rectovaginal septum endometriosis and laparoscopic ileocecectomy 4/8/16 with Dr. Alexandre and Dr. Smart, laparoscopic excision of terminal ileal nodule and laparoscopic excision of sigmoid colon nodule on 1/25/19 with Dr. Alexandre and Dr. Smart, cystoscopy hydrodistention with Dr. Finkelstein on 8/11/20.\par \par She has had a documented UTI roughly ever 6-10 weeks over the last year.  Symptoms consistent of dysuria and urgency.   She denies any fevers or chills.  She does not think it is related to intercourse.  She has been on and off abx for the last few months.  She has been taking nitrofurantoin for suppression and despite this she gets breakthrough UTIs.  She has also been taking D-mannose and methenamine.  \par \par She received a culture result during our session together from a specimen sent earlier this week.  IT is again growing enterococcus it is sensitive to cipro.  She has not had upper tract imaging in several years\par \par **********\par 7/19/21:\par 42 yo female returns for follow up.  She has had one positive urine cx since her last visit.  She did not take antibiotics.  She is having mild symptoms but it is always hard to differentiate between endometriosis related symptoms and UTI symptoms.  \par \par She had a CTU in February 2021 which was unremarkable except for the absence of the right kidney.  She takes methenamine, D-mannose and prophylactic nitrofurantoin.  \par

## 2021-07-20 LAB
APPEARANCE: CLEAR
BACTERIA: NEGATIVE
BILIRUBIN URINE: NEGATIVE
BLOOD URINE: NEGATIVE
COLOR: COLORLESS
GLUCOSE QUALITATIVE U: NEGATIVE
HYALINE CASTS: 1 /LPF
KETONES URINE: NEGATIVE
LEUKOCYTE ESTERASE URINE: NEGATIVE
MICROSCOPIC-UA: NORMAL
NITRITE URINE: NEGATIVE
PH URINE: 7
PROTEIN URINE: NEGATIVE
RED BLOOD CELLS URINE: 1 /HPF
SPECIFIC GRAVITY URINE: 1.01
SQUAMOUS EPITHELIAL CELLS: 1 /HPF
UROBILINOGEN URINE: NORMAL
WHITE BLOOD CELLS URINE: 0 /HPF

## 2021-07-21 LAB — BACTERIA UR CULT: NORMAL

## 2021-07-22 ENCOUNTER — TRANSCRIPTION ENCOUNTER (OUTPATIENT)
Age: 41
End: 2021-07-22

## 2021-07-24 ENCOUNTER — TRANSCRIPTION ENCOUNTER (OUTPATIENT)
Age: 41
End: 2021-07-24

## 2021-07-27 ENCOUNTER — EMERGENCY (EMERGENCY)
Facility: HOSPITAL | Age: 41
LOS: 1 days | Discharge: HOME | End: 2021-07-27
Attending: EMERGENCY MEDICINE | Admitting: EMERGENCY MEDICINE
Payer: COMMERCIAL

## 2021-07-27 VITALS
OXYGEN SATURATION: 99 % | TEMPERATURE: 98 F | HEIGHT: 68 IN | RESPIRATION RATE: 18 BRPM | DIASTOLIC BLOOD PRESSURE: 73 MMHG | WEIGHT: 145.06 LBS | HEART RATE: 75 BPM | SYSTOLIC BLOOD PRESSURE: 123 MMHG

## 2021-07-27 VITALS
OXYGEN SATURATION: 98 % | TEMPERATURE: 98 F | RESPIRATION RATE: 18 BRPM | DIASTOLIC BLOOD PRESSURE: 64 MMHG | HEART RATE: 55 BPM | SYSTOLIC BLOOD PRESSURE: 95 MMHG

## 2021-07-27 DIAGNOSIS — Z87.440 PERSONAL HISTORY OF URINARY (TRACT) INFECTIONS: ICD-10-CM

## 2021-07-27 DIAGNOSIS — G47.00 INSOMNIA, UNSPECIFIED: ICD-10-CM

## 2021-07-27 DIAGNOSIS — G43.909 MIGRAINE, UNSPECIFIED, NOT INTRACTABLE, WITHOUT STATUS MIGRAINOSUS: ICD-10-CM

## 2021-07-27 DIAGNOSIS — Z88.5 ALLERGY STATUS TO NARCOTIC AGENT: ICD-10-CM

## 2021-07-27 DIAGNOSIS — R10.2 PELVIC AND PERINEAL PAIN: ICD-10-CM

## 2021-07-27 DIAGNOSIS — Z90.5 ACQUIRED ABSENCE OF KIDNEY: ICD-10-CM

## 2021-07-27 DIAGNOSIS — R10.9 UNSPECIFIED ABDOMINAL PAIN: ICD-10-CM

## 2021-07-27 DIAGNOSIS — Z90.49 ACQUIRED ABSENCE OF OTHER SPECIFIED PARTS OF DIGESTIVE TRACT: Chronic | ICD-10-CM

## 2021-07-27 DIAGNOSIS — Z98.89 OTHER SPECIFIED POSTPROCEDURAL STATES: Chronic | ICD-10-CM

## 2021-07-27 DIAGNOSIS — G89.29 OTHER CHRONIC PAIN: ICD-10-CM

## 2021-07-27 DIAGNOSIS — Z90.89 ACQUIRED ABSENCE OF OTHER ORGANS: ICD-10-CM

## 2021-07-27 DIAGNOSIS — Q63.9 CONGENITAL MALFORMATION OF KIDNEY, UNSPECIFIED: Chronic | ICD-10-CM

## 2021-07-27 DIAGNOSIS — Z88.0 ALLERGY STATUS TO PENICILLIN: ICD-10-CM

## 2021-07-27 DIAGNOSIS — Z91.048 OTHER NONMEDICINAL SUBSTANCE ALLERGY STATUS: ICD-10-CM

## 2021-07-27 DIAGNOSIS — Z90.5 ACQUIRED ABSENCE OF KIDNEY: Chronic | ICD-10-CM

## 2021-07-27 DIAGNOSIS — K08.409 PARTIAL LOSS OF TEETH, UNSPECIFIED CAUSE, UNSPECIFIED CLASS: Chronic | ICD-10-CM

## 2021-07-27 DIAGNOSIS — F41.8 OTHER SPECIFIED ANXIETY DISORDERS: ICD-10-CM

## 2021-07-27 DIAGNOSIS — Z88.2 ALLERGY STATUS TO SULFONAMIDES: ICD-10-CM

## 2021-07-27 DIAGNOSIS — Z87.42 PERSONAL HISTORY OF OTHER DISEASES OF THE FEMALE GENITAL TRACT: ICD-10-CM

## 2021-07-27 LAB
ALBUMIN SERPL ELPH-MCNC: 4.4 G/DL — SIGNIFICANT CHANGE UP (ref 3.3–5)
ALP SERPL-CCNC: 44 U/L — SIGNIFICANT CHANGE UP (ref 40–120)
ALT FLD-CCNC: 14 U/L — SIGNIFICANT CHANGE UP (ref 10–45)
ANION GAP SERPL CALC-SCNC: 5 MMOL/L — SIGNIFICANT CHANGE UP (ref 5–17)
APPEARANCE UR: CLEAR — SIGNIFICANT CHANGE UP
AST SERPL-CCNC: 20 U/L — SIGNIFICANT CHANGE UP (ref 10–40)
BASOPHILS # BLD AUTO: 0.06 K/UL — SIGNIFICANT CHANGE UP (ref 0–0.2)
BASOPHILS NFR BLD AUTO: 0.8 % — SIGNIFICANT CHANGE UP (ref 0–2)
BILIRUB SERPL-MCNC: 0.6 MG/DL — SIGNIFICANT CHANGE UP (ref 0.2–1.2)
BILIRUB UR-MCNC: NEGATIVE — SIGNIFICANT CHANGE UP
BUN SERPL-MCNC: 11 MG/DL — SIGNIFICANT CHANGE UP (ref 7–23)
CALCIUM SERPL-MCNC: 9.4 MG/DL — SIGNIFICANT CHANGE UP (ref 8.4–10.5)
CHLORIDE SERPL-SCNC: 105 MMOL/L — SIGNIFICANT CHANGE UP (ref 96–108)
CO2 SERPL-SCNC: 28 MMOL/L — SIGNIFICANT CHANGE UP (ref 22–31)
COLOR SPEC: YELLOW — SIGNIFICANT CHANGE UP
CREAT SERPL-MCNC: 1.38 MG/DL — HIGH (ref 0.5–1.3)
DIFF PNL FLD: NEGATIVE — SIGNIFICANT CHANGE UP
EOSINOPHIL # BLD AUTO: 0.22 K/UL — SIGNIFICANT CHANGE UP (ref 0–0.5)
EOSINOPHIL NFR BLD AUTO: 3 % — SIGNIFICANT CHANGE UP (ref 0–6)
GLUCOSE SERPL-MCNC: 104 MG/DL — HIGH (ref 70–99)
GLUCOSE UR QL: NEGATIVE — SIGNIFICANT CHANGE UP
HCT VFR BLD CALC: 43.4 % — SIGNIFICANT CHANGE UP (ref 34.5–45)
HGB BLD-MCNC: 14.2 G/DL — SIGNIFICANT CHANGE UP (ref 11.5–15.5)
HIV 1+2 AB+HIV1 P24 AG SERPL QL IA: SIGNIFICANT CHANGE UP
IMM GRANULOCYTES NFR BLD AUTO: 0.1 % — SIGNIFICANT CHANGE UP (ref 0–1.5)
KETONES UR-MCNC: NEGATIVE — SIGNIFICANT CHANGE UP
LEUKOCYTE ESTERASE UR-ACNC: NEGATIVE — SIGNIFICANT CHANGE UP
LYMPHOCYTES # BLD AUTO: 2.45 K/UL — SIGNIFICANT CHANGE UP (ref 1–3.3)
LYMPHOCYTES # BLD AUTO: 33.3 % — SIGNIFICANT CHANGE UP (ref 13–44)
MCHC RBC-ENTMCNC: 28.7 PG — SIGNIFICANT CHANGE UP (ref 27–34)
MCHC RBC-ENTMCNC: 32.7 GM/DL — SIGNIFICANT CHANGE UP (ref 32–36)
MCV RBC AUTO: 87.7 FL — SIGNIFICANT CHANGE UP (ref 80–100)
MONOCYTES # BLD AUTO: 0.65 K/UL — SIGNIFICANT CHANGE UP (ref 0–0.9)
MONOCYTES NFR BLD AUTO: 8.8 % — SIGNIFICANT CHANGE UP (ref 2–14)
NEUTROPHILS # BLD AUTO: 3.97 K/UL — SIGNIFICANT CHANGE UP (ref 1.8–7.4)
NEUTROPHILS NFR BLD AUTO: 54 % — SIGNIFICANT CHANGE UP (ref 43–77)
NITRITE UR-MCNC: NEGATIVE — SIGNIFICANT CHANGE UP
NRBC # BLD: 0 /100 WBCS — SIGNIFICANT CHANGE UP (ref 0–0)
PH UR: 6.5 — SIGNIFICANT CHANGE UP (ref 5–8)
PLATELET # BLD AUTO: 270 K/UL — SIGNIFICANT CHANGE UP (ref 150–400)
POTASSIUM SERPL-MCNC: 5.2 MMOL/L — SIGNIFICANT CHANGE UP (ref 3.5–5.3)
POTASSIUM SERPL-SCNC: 5.2 MMOL/L — SIGNIFICANT CHANGE UP (ref 3.5–5.3)
PROT SERPL-MCNC: 7.3 G/DL — SIGNIFICANT CHANGE UP (ref 6–8.3)
PROT UR-MCNC: NEGATIVE MG/DL — SIGNIFICANT CHANGE UP
RBC # BLD: 4.95 M/UL — SIGNIFICANT CHANGE UP (ref 3.8–5.2)
RBC # FLD: 12.9 % — SIGNIFICANT CHANGE UP (ref 10.3–14.5)
SODIUM SERPL-SCNC: 138 MMOL/L — SIGNIFICANT CHANGE UP (ref 135–145)
SP GR SPEC: 1.02 — SIGNIFICANT CHANGE UP (ref 1–1.03)
UROBILINOGEN FLD QL: 0.2 E.U./DL — SIGNIFICANT CHANGE UP
WBC # BLD: 7.36 K/UL — SIGNIFICANT CHANGE UP (ref 3.8–10.5)
WBC # FLD AUTO: 7.36 K/UL — SIGNIFICANT CHANGE UP (ref 3.8–10.5)

## 2021-07-27 PROCEDURE — 99285 EMERGENCY DEPT VISIT HI MDM: CPT

## 2021-07-27 PROCEDURE — 87389 HIV-1 AG W/HIV-1&-2 AB AG IA: CPT

## 2021-07-27 PROCEDURE — 80053 COMPREHEN METABOLIC PANEL: CPT

## 2021-07-27 PROCEDURE — 96365 THER/PROPH/DIAG IV INF INIT: CPT

## 2021-07-27 PROCEDURE — 74176 CT ABD & PELVIS W/O CONTRAST: CPT | Mod: ME

## 2021-07-27 PROCEDURE — G1004: CPT

## 2021-07-27 PROCEDURE — 36415 COLL VENOUS BLD VENIPUNCTURE: CPT

## 2021-07-27 PROCEDURE — 99284 EMERGENCY DEPT VISIT MOD MDM: CPT | Mod: 25

## 2021-07-27 PROCEDURE — 96376 TX/PRO/DX INJ SAME DRUG ADON: CPT

## 2021-07-27 PROCEDURE — 74176 CT ABD & PELVIS W/O CONTRAST: CPT | Mod: 26,ME

## 2021-07-27 PROCEDURE — 96375 TX/PRO/DX INJ NEW DRUG ADDON: CPT

## 2021-07-27 PROCEDURE — 81003 URINALYSIS AUTO W/O SCOPE: CPT

## 2021-07-27 PROCEDURE — 87086 URINE CULTURE/COLONY COUNT: CPT

## 2021-07-27 PROCEDURE — 83690 ASSAY OF LIPASE: CPT

## 2021-07-27 PROCEDURE — 85025 COMPLETE CBC W/AUTO DIFF WBC: CPT

## 2021-07-27 RX ORDER — CEFTRIAXONE 500 MG/1
1000 INJECTION, POWDER, FOR SOLUTION INTRAMUSCULAR; INTRAVENOUS ONCE
Refills: 0 | Status: COMPLETED | OUTPATIENT
Start: 2021-07-27 | End: 2021-07-27

## 2021-07-27 RX ORDER — SODIUM CHLORIDE 9 MG/ML
1000 INJECTION INTRAMUSCULAR; INTRAVENOUS; SUBCUTANEOUS ONCE
Refills: 0 | Status: COMPLETED | OUTPATIENT
Start: 2021-07-27 | End: 2021-07-27

## 2021-07-27 RX ORDER — MORPHINE SULFATE 50 MG/1
2 CAPSULE, EXTENDED RELEASE ORAL ONCE
Refills: 0 | Status: DISCONTINUED | OUTPATIENT
Start: 2021-07-27 | End: 2021-07-27

## 2021-07-27 RX ORDER — CEFPODOXIME PROXETIL 100 MG
1 TABLET ORAL
Qty: 20 | Refills: 0
Start: 2021-07-27 | End: 2021-08-05

## 2021-07-27 RX ADMIN — CEFTRIAXONE 1000 MILLIGRAM(S): 500 INJECTION, POWDER, FOR SOLUTION INTRAMUSCULAR; INTRAVENOUS at 22:10

## 2021-07-27 RX ADMIN — SODIUM CHLORIDE 1000 MILLILITER(S): 9 INJECTION INTRAMUSCULAR; INTRAVENOUS; SUBCUTANEOUS at 22:13

## 2021-07-27 RX ADMIN — MORPHINE SULFATE 2 MILLIGRAM(S): 50 CAPSULE, EXTENDED RELEASE ORAL at 20:42

## 2021-07-27 RX ADMIN — MORPHINE SULFATE 2 MILLIGRAM(S): 50 CAPSULE, EXTENDED RELEASE ORAL at 22:13

## 2021-07-27 RX ADMIN — CEFTRIAXONE 100 MILLIGRAM(S): 500 INJECTION, POWDER, FOR SOLUTION INTRAMUSCULAR; INTRAVENOUS at 20:42

## 2021-07-27 RX ADMIN — SODIUM CHLORIDE 1000 MILLILITER(S): 9 INJECTION INTRAMUSCULAR; INTRAVENOUS; SUBCUTANEOUS at 20:07

## 2021-07-27 NOTE — ED PROVIDER NOTE - ATTENDING CONTRIBUTION TO CARE
pt seen and examined by me, agree with above plan.  40 yo female with left flank pain, hx of frequent utis. pt is on macrobid as preventative.  hx of right nephectromy.  on exam, mildly uncomfortable, non toxic.  no fever or tachycardia.  mild left cvat.  given iv fluids, labs checked.  normal wbc count.  ua appears normal but given hx of one kidney and frequent utis, given 1 dose of iv abx and dc on oral abx pending cultures.  fu pmd, return if worsening.

## 2021-07-27 NOTE — ED PROVIDER NOTE - OBJECTIVE STATEMENT
42 yo fem PMHx endometriosis, chronic pelvic pain, chronic-recurrent UTIs, right nephrectomy c/o left flank pain for past day.  Pain nonradiating, increases with deep breath.  No CP, SOB, cough.  Has chronic urinary frequency and pelvic discomfort, but no dysuria or hematuria.  No fever/chills, nausea/vomiting, or abdominal pain.  Taking nitrofurantoin daily for past few months but still gets UTIs.  Urine culture from July 24 > 100k E. coli, sensitive to most abx tested (reviewed in [facility]). 40 yo fem PMHx endometriosis, chronic pelvic pain, chronic-recurrent UTIs, right nephrectomy, appendectomy c/o left flank pain for past day.  Pain nonradiating, increases with deep breath.  No CP, SOB, cough.  Has chronic urinary frequency and pelvic discomfort, but no dysuria or hematuria.  No fever/chills, nausea/vomiting, or abdominal pain.  Taking nitrofurantoin daily for past few months but still gets UTIs.  Urine culture from July 24 > 100k E. coli, sensitive to most abx tested (reviewed in [facility]).    Allergy list noted, discussed with pt.  States told possibly allergic to PCN in childhood, (rash) but not confirmed.  Had severe headache with dilaudid once, but no hives, swelling, breathing problems.  Has been on morphine multiple times without adverse reaction.

## 2021-07-27 NOTE — ED PROVIDER NOTE - PHYSICAL EXAMINATION
CONSTITUTIONAL: WD,WN fem in NAD.    SKIN: Normal color and turgor.    HEAD: NC/AT.  EYES: Conjunctiva clear. EOMI. PERRL.    ENT: Airway clear. Normal voice.   RESPIRATORY:  Normal work of breathing. Lungs CTAB.  CARDIOVASCULAR:  RRR, S1S2. No M/R/G.      GI:  Abdomen soft, nontender.  : + left CVAT    MSK: Neck supple.  No lower extremity edema or calf tenderness.  No joint swelling or ROM limitation.  NEURO: Alert and oriented; CN II-XII grossly intact. Speech clear. 5/5 strength in all extremities.  Good balance. Steady gait.

## 2021-07-27 NOTE — ED ADULT TRIAGE NOTE - CHIEF COMPLAINT QUOTE
42 y/o female c/o left flank pain, hx of multiple UTIs, was prescribed Cipro, however is not taking.

## 2021-07-27 NOTE — ED ADULT NURSE NOTE - SUICIDE SCREENING DEPRESSION
[FreeTextEntry1] : minor head trauma\par cleared for school\par discussed being careful- not having another head injury\par follow up as needed Negative

## 2021-07-27 NOTE — ED PROVIDER NOTE - PROGRESS NOTE DETAILS
Lab and imaging results d/w patient.  No adverse reaction from ceftriaxone.  UA clean but culture from few days ago growing significant amt of E. coli.  Having flank pain, but no fever/vomiting, and appears nontoxic.  Unclear if this represents an infectious process, ie pyelonephritis. Will DC home on cefpodoxime.  Recommend pt follow up with both her GYN and urologist, as well as with an infectious diseases specialist. Lab and imaging results d/w patient.  No adverse reaction from ceftriaxone.  UA clean but culture from few days ago growing significant amt of E. coli.  Having flank pain, but no fever/vomiting, and appears nontoxic.  Unclear if this represents an infectious process, ie early/mild pyelonephritis. Will DC home on cefpodoxime.  Recommend pt follow up with both her GYN and urologist, as well as with an infectious diseases specialist.

## 2021-07-27 NOTE — ED ADULT NURSE NOTE - CHIEF COMPLAINT QUOTE
40 y/o female c/o left flank pain, hx of multiple UTIs, was prescribed Cipro, however is not taking.

## 2021-07-27 NOTE — ED PROVIDER NOTE - PSH
Congenital renal anomaly  Right kidney corrective procedure (?), ureteral stent placment 2008.  History of appendectomy    History of arthroscopy of shoulder  Right Labrum Tear Repair-2012  History of nephrectomy, unilateral  right kidney 2010  S/P laparoscopic procedure  for endometriosis affecting bladder/colon/with ovary adhesion to uterus  Status post appendectomy    Status post colon resection    [place] teeth extracted

## 2021-07-27 NOTE — ED PROVIDER NOTE - PATIENT PORTAL LINK FT
You can access the FollowMyHealth Patient Portal offered by [facility] by registering at the following website: [email or web address]. By joining [employer]’s FollowMyHealth portal, you will also be able to view your health information using other applications (apps) compatible with our system.

## 2021-07-27 NOTE — ED PROVIDER NOTE - CLINICAL SUMMARY MEDICAL DECISION MAKING FREE TEXT BOX
chronic UTIs, chronic pelvic pain, and now with new left flank pain.  Urine culture from few days ago is positive, sensitivities reviewed will give abx based on them.  will get noncontrast CT (hx right nephrectomy due to chronic infections) to rule out stone.  appears nontoxic, and afebrile without N/V; low suspicion for pyelonephritis.

## 2021-07-27 NOTE — ED PROVIDER NOTE - CARE PROVIDER_API CALL
Mohan Jackson  INFECTIOUS DISEASE  [address], [address]  [place] [address]  Phone: [phone]  Fax: [phone]  Follow Up Time:

## 2021-07-27 NOTE — ED PROVIDER NOTE - NSFOLLOWUPINSTRUCTIONS_ED_ALL_ED_FT
Take the antibiotic (cefpodoxime) as prescribed.  Increase intake of fluids by mouth to improve kidney function.    Tylenol if needed for pain (use as directed); do not use NSAIDs (ie ibuprofen/naproxen).    Follow up with both your GYN and urologist in 1-2 days; you should also consider seeing an infectious diseases specialist (ie Dr Jackson or you can look on your insurance company's website).    You may also want to see an allergist at some point to determine penicillin allergy.    Return to the Emergency Department if you have any new or worsening symptoms, or if you have any concerns.  =========================    Flank Pain    WHAT YOU NEED TO KNOW:    Flank pain is felt in the area below your ribcage and above your hip bones, often in the lower back. Your pain may be dull or so severe that you cannot get comfortable. The pain may stay in one area or radiate to another area. It may worsen and lighten in waves. Flank pain is often a sign of problems with your urinary tract, such as a kidney stone or infection.     DISCHARGE INSTRUCTIONS:    Return to the emergency department if:   •You have a fever.       •Your heart is fluttering or jumping.       •You see blood in your urine.       •Your pain radiates into your lower abdomen and genital area.       •You have intense pain in your low back next to your spine.       •You are much more tired than usual and have no desire to eat.       •You have a headache and your muscles jerk.       Contact your healthcare provider if:   •You have an upset stomach and are vomiting.      •You have to urinate more often, and with urgency.       •Your pain worsens or does not improve, and you cannot get comfortable.       •You pass a stone when you urinate.      •You have questions or concerns about your condition or care.      Medicines: The following medicines may be ordered for you:  •Pain medicine may help decrease or relieve your pain. Do not wait until the pain is severe before you take your medicine.       •Antibiotics may help treat a urinary tract infection caused by bacteria.       •Take your medicine as directed. Contact your healthcare provider if you think your medicine is not helping or if you have side effects. Tell him of her if you are allergic to any medicine. Keep a list of the medicines, vitamins, and herbs you take. Include the amounts, and when and why you take them. Bring the list or the pill bottles to follow-up visits. Carry your medicine list with you in case of an emergency.      Follow up with your healthcare provider in 1 to 2 days or as directed: Write down your questions so you remember to ask them during your visits.

## 2021-07-28 RX ORDER — CEFPODOXIME PROXETIL 100 MG
1 TABLET ORAL
Qty: 20 | Refills: 0
Start: 2021-07-28 | End: 2021-08-06

## 2021-07-29 LAB
CULTURE RESULTS: SIGNIFICANT CHANGE UP
SPECIMEN SOURCE: SIGNIFICANT CHANGE UP

## 2021-07-30 ENCOUNTER — TRANSCRIPTION ENCOUNTER (OUTPATIENT)
Age: 41
End: 2021-07-30

## 2021-08-10 ENCOUNTER — TRANSCRIPTION ENCOUNTER (OUTPATIENT)
Age: 41
End: 2021-08-10

## 2021-08-11 ENCOUNTER — TRANSCRIPTION ENCOUNTER (OUTPATIENT)
Age: 41
End: 2021-08-11

## 2021-08-20 ENCOUNTER — RX RENEWAL (OUTPATIENT)
Age: 41
End: 2021-08-20

## 2021-08-23 ENCOUNTER — APPOINTMENT (OUTPATIENT)
Dept: RHEUMATOLOGY | Facility: CLINIC | Age: 41
End: 2021-08-23

## 2021-08-24 ENCOUNTER — TRANSCRIPTION ENCOUNTER (OUTPATIENT)
Age: 41
End: 2021-08-24

## 2021-08-24 ENCOUNTER — RX RENEWAL (OUTPATIENT)
Age: 41
End: 2021-08-24

## 2021-09-08 ENCOUNTER — TRANSCRIPTION ENCOUNTER (OUTPATIENT)
Age: 41
End: 2021-09-08

## 2021-09-21 ENCOUNTER — TRANSCRIPTION ENCOUNTER (OUTPATIENT)
Age: 41
End: 2021-09-21

## 2021-09-22 ENCOUNTER — RX RENEWAL (OUTPATIENT)
Age: 41
End: 2021-09-22

## 2021-09-23 ENCOUNTER — TRANSCRIPTION ENCOUNTER (OUTPATIENT)
Age: 41
End: 2021-09-23

## 2021-09-28 ENCOUNTER — RX RENEWAL (OUTPATIENT)
Age: 41
End: 2021-09-28

## 2021-09-29 ENCOUNTER — TRANSCRIPTION ENCOUNTER (OUTPATIENT)
Age: 41
End: 2021-09-29

## 2021-10-01 ENCOUNTER — TRANSCRIPTION ENCOUNTER (OUTPATIENT)
Age: 41
End: 2021-10-01

## 2021-10-04 ENCOUNTER — TRANSCRIPTION ENCOUNTER (OUTPATIENT)
Age: 41
End: 2021-10-04

## 2021-10-06 ENCOUNTER — APPOINTMENT (OUTPATIENT)
Dept: INTERNAL MEDICINE | Facility: CLINIC | Age: 41
End: 2021-10-06
Payer: MEDICAID

## 2021-10-06 PROCEDURE — 99214 OFFICE O/P EST MOD 30 MIN: CPT | Mod: 95

## 2021-10-06 RX ORDER — CIPROFLOXACIN HYDROCHLORIDE 500 MG/1
500 TABLET, FILM COATED ORAL
Qty: 14 | Refills: 0 | Status: DISCONTINUED | COMMUNITY
Start: 2021-06-18 | End: 2021-10-06

## 2021-10-06 RX ORDER — ALPRAZOLAM 0.25 MG/1
0.25 TABLET ORAL
Qty: 15 | Refills: 0 | Status: DISCONTINUED | COMMUNITY
Start: 2018-12-31 | End: 2021-10-06

## 2021-10-06 RX ORDER — ACETAMINOPHEN AND CODEINE 300; 30 MG/1; MG/1
300-30 TABLET ORAL 4 TIMES DAILY
Qty: 20 | Refills: 0 | Status: DISCONTINUED | COMMUNITY
Start: 2020-11-13 | End: 2021-10-06

## 2021-11-10 ENCOUNTER — RX RENEWAL (OUTPATIENT)
Age: 41
End: 2021-11-10

## 2021-11-17 NOTE — ED ADULT NURSE NOTE - PRO INTERPRETER NEED 2
[FreeTextEntry1] : This note was written by Angelia Odonnell on 11/17/2021 acting as scribe for Dr. Joe. 
English

## 2021-11-20 ENCOUNTER — TRANSCRIPTION ENCOUNTER (OUTPATIENT)
Age: 41
End: 2021-11-20

## 2021-12-10 ENCOUNTER — RX RENEWAL (OUTPATIENT)
Age: 41
End: 2021-12-10

## 2021-12-11 ENCOUNTER — RX RENEWAL (OUTPATIENT)
Age: 41
End: 2021-12-11

## 2022-01-10 ENCOUNTER — TRANSCRIPTION ENCOUNTER (OUTPATIENT)
Age: 42
End: 2022-01-10

## 2022-01-19 ENCOUNTER — TRANSCRIPTION ENCOUNTER (OUTPATIENT)
Age: 42
End: 2022-01-19

## 2022-02-01 NOTE — H&P ADULT - HISTORY OF PRESENT ILLNESS
38y G0  with Last Menstrual Period 1/7/19 presenting for scheduled for laparoscopic surgery. Patient has had previous endometriosis surgery in the past and symptoms have returned. Patient reports painful and heavy periods. Reports that her pain is mostly on the right side, RLQ, and down right back of her leg. States that the pain is constant, but worse with periods. Also reports dyspareunia. Has pain with bowel movements and frequent loose BM.   She had a right hemicolectomy performed by Dr. Alexandre in 2016.       OB H/x:    GYN H/x:  H/x of cysts, fibroids, endometriosis: endometriosis excision 2016, left endometrioma   H/x of STIs: denies       PAST MEDICAL & SURGICAL HISTORY:  Panic disorder  Anxiety  Insomnia  Febrile neutropenia: ER admission 12/05/16, had follow up with primary care MD.  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  History of nephrectomy, unilateral: right kidney 2010  History of arthroscopy of shoulder: Right Labrum Tear Repair-2012  S/P laparoscopic procedure: for endometriosis affecting bladder/colon/with ovary adhesion to uterus      MEDICATIONS  (STANDING):  Motrin PRN  CBD oils  Xanax PRN  Triptans PRN      Allergies    adhesives (Hives; Rash)  chlorhexidine topical (Rash)  Dilaudid (Unknown)  Lamictal (Other)  penicillin (Hives)  Steri strips:    big blood blisters (Other)         Vital Signs Last 24 Hrs  T(C): 36.3 (25 Jan 2019 10:44), Max: 36.3 (25 Jan 2019 10:44)  T(F): 97.4 (25 Jan 2019 10:44), Max: 97.4 (25 Jan 2019 10:44)  HR: 79 (25 Jan 2019 10:44) (79 - 79)  BP: 110/59 (25 Jan 2019 10:44) (110/59 - 110/59)  BP(mean): --  RR: 16 (25 Jan 2019 10:44) (16 - 16)  SpO2: --    Physical Exam:  Gen: NAD  GI: soft, nontender, nondistended + BS, no rebound no guarding
Detail Level: Detailed
Quality 226: Preventive Care And Screening: Tobacco Use: Screening And Cessation Intervention: Patient screened for tobacco use and is an ex/non-smoker
Quality 431: Preventive Care And Screening: Unhealthy Alcohol Use - Screening: Patient not identified as an unhealthy alcohol user when screened for unhealthy alcohol use using a systematic screening method

## 2022-02-02 ENCOUNTER — TRANSCRIPTION ENCOUNTER (OUTPATIENT)
Age: 42
End: 2022-02-02

## 2022-02-02 ENCOUNTER — RESULT REVIEW (OUTPATIENT)
Age: 42
End: 2022-02-02

## 2022-03-01 ENCOUNTER — TRANSCRIPTION ENCOUNTER (OUTPATIENT)
Age: 42
End: 2022-03-01

## 2022-03-01 ENCOUNTER — RX RENEWAL (OUTPATIENT)
Age: 42
End: 2022-03-01

## 2022-03-22 ENCOUNTER — APPOINTMENT (OUTPATIENT)
Dept: INTERNAL MEDICINE | Facility: CLINIC | Age: 42
End: 2022-03-22
Payer: MEDICAID

## 2022-03-22 ENCOUNTER — NON-APPOINTMENT (OUTPATIENT)
Age: 42
End: 2022-03-22

## 2022-03-22 VITALS
HEART RATE: 82 BPM | TEMPERATURE: 98 F | RESPIRATION RATE: 16 BRPM | WEIGHT: 150 LBS | BODY MASS INDEX: 22.73 KG/M2 | DIASTOLIC BLOOD PRESSURE: 73 MMHG | SYSTOLIC BLOOD PRESSURE: 106 MMHG | OXYGEN SATURATION: 99 % | HEIGHT: 68 IN

## 2022-03-22 DIAGNOSIS — N93.9 ABNORMAL UTERINE AND VAGINAL BLEEDING, UNSPECIFIED: ICD-10-CM

## 2022-03-22 DIAGNOSIS — N80.9 ENDOMETRIOSIS, UNSPECIFIED: ICD-10-CM

## 2022-03-22 DIAGNOSIS — S73.199A OTHER SPRAIN OF UNSPECIFIED HIP, INITIAL ENCOUNTER: ICD-10-CM

## 2022-03-22 DIAGNOSIS — Z00.01 ENCOUNTER FOR GENERAL ADULT MEDICAL EXAMINATION WITH ABNORMAL FINDINGS: ICD-10-CM

## 2022-03-22 PROCEDURE — 99385 PREV VISIT NEW AGE 18-39: CPT | Mod: 25

## 2022-03-22 PROCEDURE — 99204 OFFICE O/P NEW MOD 45 MIN: CPT | Mod: GC,25

## 2022-03-22 PROCEDURE — 99214 OFFICE O/P EST MOD 30 MIN: CPT | Mod: GC,25

## 2022-03-24 LAB
ALBUMIN SERPL ELPH-MCNC: 4.3 G/DL
ALP BLD-CCNC: 46 U/L
ALT SERPL-CCNC: 11 U/L
ANION GAP SERPL CALC-SCNC: 12 MMOL/L
AST SERPL-CCNC: 19 U/L
BASOPHILS # BLD AUTO: 0.06 K/UL
BASOPHILS NFR BLD AUTO: 0.9 %
BILIRUB SERPL-MCNC: 0.4 MG/DL
BUN SERPL-MCNC: 12 MG/DL
CALCIUM SERPL-MCNC: 9.7 MG/DL
CHLORIDE SERPL-SCNC: 104 MMOL/L
CO2 SERPL-SCNC: 25 MMOL/L
CREAT SERPL-MCNC: 1.14 MG/DL
CRP SERPL-MCNC: <3 MG/L
EGFR: 63 ML/MIN/1.73M2
EOSINOPHIL # BLD AUTO: 0.21 K/UL
EOSINOPHIL NFR BLD AUTO: 3 %
ERYTHROCYTE [SEDIMENTATION RATE] IN BLOOD BY WESTERGREN METHOD: 3 MM/HR
GLUCOSE SERPL-MCNC: 86 MG/DL
HCG SERPL QL: NEGATIVE
HCT VFR BLD CALC: 46 %
HGB BLD-MCNC: 14.3 G/DL
IMM GRANULOCYTES NFR BLD AUTO: 0.3 %
LYMPHOCYTES # BLD AUTO: 2.35 K/UL
LYMPHOCYTES NFR BLD AUTO: 33.5 %
MAN DIFF?: NORMAL
MCHC RBC-ENTMCNC: 27.9 PG
MCHC RBC-ENTMCNC: 31.1 GM/DL
MCV RBC AUTO: 89.8 FL
MONOCYTES # BLD AUTO: 0.55 K/UL
MONOCYTES NFR BLD AUTO: 7.8 %
NEUTROPHILS # BLD AUTO: 3.83 K/UL
NEUTROPHILS NFR BLD AUTO: 54.5 %
PAPP-A SERPL-ACNC: <1 MIU/ML
PLATELET # BLD AUTO: 268 K/UL
POTASSIUM SERPL-SCNC: 4.3 MMOL/L
PROT SERPL-MCNC: 7.1 G/DL
RBC # BLD: 5.12 M/UL
RBC # FLD: 12.5 %
SODIUM SERPL-SCNC: 141 MMOL/L
TSH SERPL-ACNC: 3.99 UIU/ML
VIT B12 SERPL-MCNC: 503 PG/ML
WBC # FLD AUTO: 7.02 K/UL

## 2022-03-25 ENCOUNTER — TRANSCRIPTION ENCOUNTER (OUTPATIENT)
Age: 42
End: 2022-03-25

## 2022-03-29 ENCOUNTER — APPOINTMENT (OUTPATIENT)
Dept: ORTHOPEDIC SURGERY | Facility: CLINIC | Age: 42
End: 2022-03-29
Payer: MEDICAID

## 2022-03-29 ENCOUNTER — APPOINTMENT (OUTPATIENT)
Dept: INTERNAL MEDICINE | Facility: CLINIC | Age: 42
End: 2022-03-29

## 2022-03-29 VITALS
HEART RATE: 79 BPM | TEMPERATURE: 98.7 F | SYSTOLIC BLOOD PRESSURE: 114 MMHG | HEIGHT: 68 IN | DIASTOLIC BLOOD PRESSURE: 78 MMHG | OXYGEN SATURATION: 98 % | WEIGHT: 150 LBS | BODY MASS INDEX: 22.73 KG/M2

## 2022-03-29 DIAGNOSIS — M25.851 OTHER SPECIFIED JOINT DISORDERS, RIGHT HIP: ICD-10-CM

## 2022-03-29 DIAGNOSIS — M70.61 TROCHANTERIC BURSITIS, RIGHT HIP: ICD-10-CM

## 2022-03-29 DIAGNOSIS — M25.852 OTHER SPECIFIED JOINT DISORDERS, LEFT HIP: ICD-10-CM

## 2022-03-29 PROCEDURE — 99204 OFFICE O/P NEW MOD 45 MIN: CPT | Mod: 25

## 2022-03-29 PROCEDURE — 20611 DRAIN/INJ JOINT/BURSA W/US: CPT | Mod: RT

## 2022-03-29 NOTE — PROCEDURE
[de-identified] : ULTRASOUND-GUIDED TROCHANTERIC BURSA INJECTION\par Indication for Ultrasound: To visualize trochanteric bursa and avoid damage to tendons and surrounding neurovascular structures\par \par Injection: RIGHT Hip.\par Indication: Trochanteric Bursitis.\par \par A discussion was had with the patient regarding this procedure and all questions were answered. All risks, benefits and alternatives were discussed. These included but were not limited to bleeding, infection, injection site reaction/complication and allergic reaction. A timeout was done to ensure correct side and pt agreed to the procedure.  Alcohol was used to clean the skin, and betadine was used to sterilize and prep the area in the lateral aspect of the hip. The trochanteric bursa was visualized utilizing the Sonosite, linear transducer. The joint was visualized in the short axis and an in-plane approach was used for the injection. Ultrasound guidance was utilized to ensure accuracy of the injection.  A 25-gauge needle was used to inject 2cc of 1% lidocaine and 1cc of KENALOG into the greater trochanteric bursa using a needling technique. A sterile bandage was then applied. The patient tolerated the procedure well and there were no complications.\par

## 2022-03-29 NOTE — DISCUSSION/SUMMARY
[Medication Risks Reviewed] : Medication risks reviewed [de-identified] : The patient is a 41 year old woman with history of Sjogren's Syndrome, hypermobility presenting with chronic, atraumatic bilateral hip pain, right worse than left.  She has MRI evidence of bilateral labral tears in the setting of increased femoral anteversion.  Certainly, hypermobility may be attributed to chronic pain symptoms.  Pain generator today appears to be related to right trochanteric bursitis with gluteal tendinopathy.\par \par Imaging/Diagnostics/Medical Records were interpreted and/or reviewed and results were reviewed with the patient in detail.  All questions were answered appropriately.\par \par The patient was counseled on the natural progression of the problem(s) today and potential complications of diagnoses.  The patient was provided reassurance today.\par \par After informed patient consent, it has been agreed upon to trial a course of conservative, nonoperative treatment.\par \par After informed consent, and explanation of risks, benefits, alternatives, adverse effects of injection, which includes but is not limited to infection, bleeding, allergic reaction, swelling, soft tissue weakening/tendon rupture, neurovascular injury, injection site complication, fat atrophy, skin depigmentation, failure to improve symptoms, the patient would like to proceed with the procedure - RIGHT GREATER TROCH BURSA ULTRASOUND-GUIDED CORTICOSTEROID INJECTION. See procedure note above. Patient tolerated the procedure well. The patient was provided with postinjection instructions.\par \par Patient was prescribed a course of physical therapy today.  The patient was also provided some general home exercises.  The patient was counseled on activity modification.\par \par Follow-up in 6-8 weeks.\par \par ------------------------------------------------------------------------------------------------------------------\par Patient appreciates and agrees with current plan.\par \par The patient's diagnosis above was evaluated by me, personally.  Diagnostic Testing and treatment options were discussed with the patient in detail.  The risks/benefits/potential complications of diagnostic testing/treatments were described in detail.  \par \par This note was generated using a mixture of manual typing and dragon medical dictation software.  A reasonable effort has been made for proofreading its contents, but typos may still remain.  If there are any questions or points of clarification needed please notify my office.\par \par \par >45 minutes of time was spent on total encounter.  >50% of the visit was spent on face-to-face counseling/coordination of care and medical-decision making for this patient.\par \par \par

## 2022-03-29 NOTE — PHYSICAL EXAM
[de-identified] : General: Well-nourished, well-developed, alert, and in no acute distress.\par Head: Normocephalic.\par Eyes: Pupils equal round reactive to light and accommodation, extraocular muscles intact, normal sclera.\par Nose: No nasal discharge.\par Cardiac: Regular rate. Extremities are warm and well perfused. Distal pulses are symmetric bilaterally.\par Respiratory: No labored breathing.\par Extremities: Sensation is intact distally bilaterally.  Distal pulses are symmetric bilaterally\par Lymphatic: No regional lymphadenopathy, no lymphedema\par Neurologic: No focal deficits\par Skin: Normal skin color, texture, and turgor\par Psychiatric: Normal affect\par MSK: as noted above/below\par \par \par \par LEFT HIP:\par \par Inspection: no bruising, erythema, rash, deformity \par Palpation: PAIN AT GT, NO  ITB pain , Hip Flexor pain  , Piriformis pain , Proximal Hamstring Pain , Groin pain \par ROM: INCREASED Internal Rotation , External Rotation\par Strength: 5/5 Hip Flexion, Hip Extension, Hip Abduction, Hip Adduction\par \par Distal Pulses: normal\par Lower Extremity Sensation: normal \par Patellar/Achilles Reflex: normal\par \par Special Tests:\par Stinchfield: NEGATIVE \par Log Roll: NEGATIVE\par FABERE: NEGATIVE\par FADIR: NEGATIVE\par Obers: NEGATIVE\par \par RIGHT HIP:\par \par Inspection: no bruising, erythema, rash, deformity \par Palpation: PAIN AT GREATER TROCH, PAIN AT GLUTES, ITB pain , Hip Flexor pain  , Piriformis pain , Proximal Hamstring Pain , Groin pain \par ROM: INCREASED Internal Rotation , External Rotation\par Strength: 5/5 Hip Flexion, Hip Extension, Hip Abduction, Hip Adduction\par \par Distal Pulses: normal\par Lower Extremity Sensation: normal \par Patellar/Achilles Reflex: normal\par \par Special Tests:\par Stinchfield: NEGATIVE \par Log Roll: NEGATIVE\par FABERE: POSITIVE\par FADIR: POSITIVE\par Obers: NEGATIVE\par \par  [de-identified] : MRIs from [facility]\par \par MRI of the left hip demonstrates:\par \par 1.  Tear of the anterior to anterosuperior labrum. Additional tear along the base of the superior to posterosuperior labrum.\par 2.  Small joint effusion. \par 3.  No visualized cartilage abnormality. \par 4.  Mild proximal hamstring tendinosis of the semimembranosus.\par \par MRI of the right hip demonstrates:\par \par 1.  Tear along the base of the superior to posterosuperior labrum.  Fraying of the anterior labrum. \par 2.  Small joint effusion. \par 3.  No visualized cartilage defect.\par 4.  Mild gluteus minimus and minimus tendinosis with associated low-grade muscle strain of the gluteus minimus and peritendinous edema. \par \par MR Pelvis\par \par No definite evidence of endometriosis. Note that sensitivity in detecting superficial endometriotic implants is limited with MRI.\par

## 2022-03-29 NOTE — HISTORY OF PRESENT ILLNESS
[de-identified] : The patient is a 41 year old woman with history of Sjogrens' Syndrome, hypermobility presenting with hip pain.\par \par She presents with a one year history of chronic, atraumatic bilateral hip pain, right worse than left.  She has had intermittent groin pain, mostly activity-based, but recent pain has been worse on right lateral hip, worse when lying on affected side.  She has a history of bilateral shoulder SLAP tears s/p repair, but feels like her symptoms are back to previous baseline prior surgery.  PReviously active, but pain has restricted her from some exercise.  The patient denies distal numbness, weakness, paresthesias.  She recalls seeing a Ortho last year, and being diagnosed with bilateral acetabular labral tears, and had a right troch bursa injection, and had tried PT with temporary relief.\par \par \par Pain is rated 8/10, described as achy, improved with CBD, worse with lying.\par  [8] : a current pain level of 8/10

## 2022-03-30 ENCOUNTER — TRANSCRIPTION ENCOUNTER (OUTPATIENT)
Age: 42
End: 2022-03-30

## 2022-04-07 ENCOUNTER — TRANSCRIPTION ENCOUNTER (OUTPATIENT)
Age: 42
End: 2022-04-07

## 2022-04-11 PROBLEM — Z11.59 SCREENING FOR VIRAL DISEASE: Status: ACTIVE | Noted: 2020-11-03

## 2022-04-13 ENCOUNTER — APPOINTMENT (OUTPATIENT)
Dept: INTERNAL MEDICINE | Facility: CLINIC | Age: 42
End: 2022-04-13

## 2022-04-13 ENCOUNTER — TRANSCRIPTION ENCOUNTER (OUTPATIENT)
Age: 42
End: 2022-04-13

## 2022-04-19 ENCOUNTER — APPOINTMENT (OUTPATIENT)
Dept: PSYCHIATRY | Facility: TELEHEALTH | Age: 42
End: 2022-04-19

## 2022-04-19 NOTE — ED PROVIDER NOTE - NSCAREINITIATED _GEN_ER
Latanya Martínez) Hatchet Flap Text: The defect edges were debeveled with a #15 scalpel blade.  Given the location of the defect, shape of the defect and the proximity to free margins a hatchet flap was deemed most appropriate.  Using a sterile surgical marker, an appropriate hatchet flap was drawn incorporating the defect and placing the expected incisions within the relaxed skin tension lines where possible.    The area thus outlined was incised deep to adipose tissue with a #15 scalpel blade.  The skin margins were undermined to an appropriate distance in all directions utilizing iris scissors.

## 2022-04-20 ENCOUNTER — TRANSCRIPTION ENCOUNTER (OUTPATIENT)
Age: 42
End: 2022-04-20

## 2022-04-21 ENCOUNTER — RX RENEWAL (OUTPATIENT)
Age: 42
End: 2022-04-21

## 2022-04-21 ENCOUNTER — TRANSCRIPTION ENCOUNTER (OUTPATIENT)
Age: 42
End: 2022-04-21

## 2022-04-22 ENCOUNTER — RX RENEWAL (OUTPATIENT)
Age: 42
End: 2022-04-22

## 2022-04-22 ENCOUNTER — OUTPATIENT (OUTPATIENT)
Dept: OUTPATIENT SERVICES | Facility: HOSPITAL | Age: 42
LOS: 1 days | End: 2022-04-22
Payer: COMMERCIAL

## 2022-04-22 DIAGNOSIS — Z98.89 OTHER SPECIFIED POSTPROCEDURAL STATES: Chronic | ICD-10-CM

## 2022-04-22 DIAGNOSIS — Z90.5 ACQUIRED ABSENCE OF KIDNEY: Chronic | ICD-10-CM

## 2022-04-22 DIAGNOSIS — Q63.9 CONGENITAL MALFORMATION OF KIDNEY, UNSPECIFIED: Chronic | ICD-10-CM

## 2022-04-22 DIAGNOSIS — Z90.49 ACQUIRED ABSENCE OF OTHER SPECIFIED PARTS OF DIGESTIVE TRACT: Chronic | ICD-10-CM

## 2022-04-22 DIAGNOSIS — K08.409 PARTIAL LOSS OF TEETH, UNSPECIFIED CAUSE, UNSPECIFIED CLASS: Chronic | ICD-10-CM

## 2022-04-22 PROCEDURE — 73521 X-RAY EXAM HIPS BI 2 VIEWS: CPT

## 2022-04-22 PROCEDURE — 73521 X-RAY EXAM HIPS BI 2 VIEWS: CPT | Mod: 26

## 2022-04-26 ENCOUNTER — APPOINTMENT (OUTPATIENT)
Dept: PSYCHIATRY | Facility: TELEHEALTH | Age: 42
End: 2022-04-26
Payer: MEDICAID

## 2022-04-26 DIAGNOSIS — Z81.4 FAMILY HISTORY OF OTHER SUBSTANCE ABUSE AND DEPENDENCE: ICD-10-CM

## 2022-04-26 PROCEDURE — 90792 PSYCH DIAG EVAL W/MED SRVCS: CPT | Mod: 95

## 2022-04-27 PROBLEM — Z81.4 FAMILY HISTORY OF SUBSTANCE ABUSE: Status: ACTIVE | Noted: 2022-04-27

## 2022-05-13 ENCOUNTER — APPOINTMENT (OUTPATIENT)
Dept: INTERNAL MEDICINE | Facility: CLINIC | Age: 42
End: 2022-05-13
Payer: MEDICAID

## 2022-05-13 VITALS
SYSTOLIC BLOOD PRESSURE: 111 MMHG | DIASTOLIC BLOOD PRESSURE: 70 MMHG | TEMPERATURE: 97.4 F | BODY MASS INDEX: 22.73 KG/M2 | HEIGHT: 68 IN | WEIGHT: 150 LBS | HEART RATE: 81 BPM | OXYGEN SATURATION: 97 %

## 2022-05-13 DIAGNOSIS — Z86.59 PERSONAL HISTORY OF OTHER MENTAL AND BEHAVIORAL DISORDERS: ICD-10-CM

## 2022-05-13 PROCEDURE — 99214 OFFICE O/P EST MOD 30 MIN: CPT | Mod: GC

## 2022-05-13 RX ORDER — MIRTAZAPINE 15 MG/1
15 TABLET, FILM COATED ORAL
Qty: 30 | Refills: 0 | Status: DISCONTINUED | COMMUNITY
Start: 2022-04-26 | End: 2022-05-13

## 2022-05-13 RX ORDER — METHYLPREDNISOLONE 4 MG/1
4 TABLET ORAL
Qty: 1 | Refills: 0 | Status: DISCONTINUED | COMMUNITY
Start: 2020-12-22 | End: 2022-05-13

## 2022-05-13 RX ORDER — HYDROXYZINE HYDROCHLORIDE 10 MG/1
10 TABLET ORAL
Qty: 30 | Refills: 0 | Status: DISCONTINUED | COMMUNITY
Start: 2021-02-24 | End: 2022-05-13

## 2022-05-13 RX ORDER — METHENAMINE, SODIUM PHOSPHATE, MONOBASIC, MONOHYDRATE, PHENYL SALICYLATE, METHYLENE BLUE, AND HYOSCYAMINE SULFATE 118; 40.8; 36; 10; .12 MG/1; MG/1; MG/1; MG/1; MG/1
118 CAPSULE ORAL
Refills: 0 | Status: DISCONTINUED | COMMUNITY
Start: 2020-07-31 | End: 2022-05-13

## 2022-05-13 RX ORDER — FOLIC ACID 1 MG/1
1 TABLET ORAL
Qty: 30 | Refills: 3 | Status: DISCONTINUED | COMMUNITY
Start: 2020-12-22 | End: 2022-05-13

## 2022-05-13 RX ORDER — METHOTREXATE 2.5 MG/1
2.5 TABLET ORAL
Qty: 12 | Refills: 3 | Status: DISCONTINUED | COMMUNITY
Start: 2020-12-22 | End: 2022-05-13

## 2022-05-13 RX ORDER — BUPROPION HYDROCHLORIDE 75 MG/1
75 TABLET, FILM COATED ORAL
Qty: 180 | Refills: 0 | Status: DISCONTINUED | COMMUNITY
Start: 2021-10-06 | End: 2022-05-13

## 2022-05-16 ENCOUNTER — TRANSCRIPTION ENCOUNTER (OUTPATIENT)
Age: 42
End: 2022-05-16

## 2022-05-23 ENCOUNTER — APPOINTMENT (OUTPATIENT)
Dept: RHEUMATOLOGY | Facility: CLINIC | Age: 42
End: 2022-05-23
Payer: MEDICAID

## 2022-05-23 VITALS
HEART RATE: 75 BPM | OXYGEN SATURATION: 99 % | BODY MASS INDEX: 22.73 KG/M2 | DIASTOLIC BLOOD PRESSURE: 69 MMHG | RESPIRATION RATE: 14 BRPM | HEIGHT: 68 IN | SYSTOLIC BLOOD PRESSURE: 111 MMHG | WEIGHT: 150 LBS | TEMPERATURE: 98.2 F

## 2022-05-23 DIAGNOSIS — M35.00 SICCA SYNDROME, UNSPECIFIED: ICD-10-CM

## 2022-05-23 PROCEDURE — 99214 OFFICE O/P EST MOD 30 MIN: CPT

## 2022-05-23 NOTE — REVIEW OF SYSTEMS
[Dry Eyes] : dryness of the eyes [Eyes Itch] : itching of the eyes [Arthralgias] : arthralgias [Joint Pain] : joint pain [Joint Swelling] : no joint swelling [Joint Stiffness] : no joint stiffness [Limb Pain] : no limb pain [Limb Swelling] : no limb swelling [Easy Bleeding] : no tendency for easy bleeding [Easy Bruising] : no tendency for easy bruising [Swollen Glands] : no swollen glands [Swollen Glands In The Neck] : no swollen glands in the neck [Negative] : Integumentary

## 2022-05-23 NOTE — PHYSICAL EXAM
[General Appearance - Alert] : alert [General Appearance - In No Acute Distress] : in no acute distress [General Appearance - Well Nourished] : well nourished [General Appearance - Well Developed] : well developed [Sclera] : the sclera and conjunctiva were normal [Outer Ear] : the ears and nose were normal in appearance [Examination Of The Oral Cavity] : the lips and gums were normal [Nasal Cavity] : the nasal mucosa and septum were normal [Neck Appearance] : the appearance of the neck was normal [Respiration, Rhythm And Depth] : normal respiratory rhythm and effort [Auscultation Breath Sounds / Voice Sounds] : lungs were clear to auscultation bilaterally [Heart Rate And Rhythm] : heart rate was normal and rhythm regular [Heart Sounds] : normal S1 and S2 [Murmurs] : no murmurs [Cervical Lymph Nodes Enlarged Posterior Bilaterally] : posterior cervical [Cervical Lymph Nodes Enlarged Anterior Bilaterally] : anterior cervical [Supraclavicular Lymph Nodes Enlarged Bilaterally] : supraclavicular [Abnormal Walk] : normal gait [Musculoskeletal - Swelling] : no joint swelling seen [Motor Tone] : muscle strength and tone were normal [] : no rash [Skin Lesions] : no skin lesions [No Focal Deficits] : no focal deficits [Impaired Insight] : insight and judgment were intact [No Spinal Tenderness] : no spinal tenderness [FreeTextEntry1] : NO evidence synovitis of any joints  [Affect] : the affect was normal

## 2022-05-23 NOTE — ASSESSMENT
[FreeTextEntry1] : 41 year old  female has very complicated  stage IV endomitoses, febrile neutropenia \par PSH of appendectomy, nephrectomy, laparoscopic excision of rectovaginal septum endometriosis and laparoscopic ileocecectomy 4/8/16,  laparoscopic excision of terminal ileal nodule and laparoscopic excision of sigmoid colon nodule on 1/25/19 cystoscopy hydrodistention. Hx of hypermobility, hip dysplasia  \par She follows with Rheumatology for  Sjogren's (dry eyes, MAX 1:640. SSa/SSb both strongly positive) with sicca symptoms without systemic disease. \par She has not done lip biopsy and diagnoses made clinically. \par Developed recurrent UTI while on MTX which was stopped one year ago and infections no longer an issues.  \par denies MSK symptoms no joint pain or swelling \par MRI b/l hip reviewed, no evidence of synovitis , b/l  labral tear noted - for which she was indicated surgery by  ortho. \par \par \par Sjogrens with sicca: no systemic disease and has normal inflammatory markers. \par c/w plaquenil 200mg BID and recommended f/u eye exam , states one few months ago was unremarkable. \par since no inflammatory MSK symptoms and recurrent UTI while on MTX no indication for immunosuppressant meds and will continue to watch. \par No indication for pilocarpine as has mild dry mouth \par c/w artificial tears  \par \par Preventive measures against dry mouth include:\par Maintenance of good hydration by taking regular sips of water, drinking sugar-free liquids, and avoidance of oral irritants (eg, coffee, alcohol, and nicotine).\par Avoidance of medications that may worsen oral dryness \par Avoidance of low-humidity environments  and the use of humidifiers to maintain adequate humidity, particularly at night.\par \par Saliva substitutes ( who do not receive sufficient symptomatic benefit from mechanical stimulation, topical stimulants of salivary flow, and water)\par Biotene mouth wash few times a day. \par \par \par f/u in 4 months \par \par

## 2022-06-13 ENCOUNTER — RX RENEWAL (OUTPATIENT)
Age: 42
End: 2022-06-13

## 2022-06-21 ENCOUNTER — RX RENEWAL (OUTPATIENT)
Age: 42
End: 2022-06-21

## 2022-06-22 ENCOUNTER — RX RENEWAL (OUTPATIENT)
Age: 42
End: 2022-06-22

## 2022-06-27 ENCOUNTER — TRANSCRIPTION ENCOUNTER (OUTPATIENT)
Age: 42
End: 2022-06-27

## 2022-07-14 ENCOUNTER — APPOINTMENT (OUTPATIENT)
Dept: INTERNAL MEDICINE | Facility: CLINIC | Age: 42
End: 2022-07-14

## 2022-07-14 ENCOUNTER — RX RENEWAL (OUTPATIENT)
Age: 42
End: 2022-07-14

## 2022-07-14 VITALS
HEART RATE: 77 BPM | TEMPERATURE: 98.8 F | WEIGHT: 152.8 LBS | HEIGHT: 68 IN | DIASTOLIC BLOOD PRESSURE: 66 MMHG | OXYGEN SATURATION: 99 % | SYSTOLIC BLOOD PRESSURE: 95 MMHG | BODY MASS INDEX: 23.16 KG/M2

## 2022-07-14 DIAGNOSIS — F32.A DEPRESSION, UNSPECIFIED: ICD-10-CM

## 2022-07-14 DIAGNOSIS — Z23 ENCOUNTER FOR IMMUNIZATION: ICD-10-CM

## 2022-07-14 DIAGNOSIS — M62.838 OTHER MUSCLE SPASM: ICD-10-CM

## 2022-07-14 PROCEDURE — 90715 TDAP VACCINE 7 YRS/> IM: CPT

## 2022-07-14 PROCEDURE — 99214 OFFICE O/P EST MOD 30 MIN: CPT | Mod: GC,25

## 2022-07-14 PROCEDURE — 90471 IMMUNIZATION ADMIN: CPT

## 2022-07-14 RX ORDER — METAXALONE 800 MG/1
800 TABLET ORAL
Qty: 60 | Refills: 1 | Status: DISCONTINUED | COMMUNITY
Start: 2021-07-30 | End: 2022-07-14

## 2022-07-14 RX ORDER — QUETIAPINE FUMARATE 25 MG/1
25 TABLET ORAL DAILY
Qty: 30 | Refills: 2 | Status: DISCONTINUED | COMMUNITY
Start: 2022-05-13 | End: 2022-07-14

## 2022-07-15 PROBLEM — Z23 NEED FOR TDAP VACCINATION: Status: ACTIVE | Noted: 2022-05-13

## 2022-07-27 ENCOUNTER — TRANSCRIPTION ENCOUNTER (OUTPATIENT)
Age: 42
End: 2022-07-27

## 2022-07-29 ENCOUNTER — TRANSCRIPTION ENCOUNTER (OUTPATIENT)
Age: 42
End: 2022-07-29

## 2022-08-08 ENCOUNTER — TRANSCRIPTION ENCOUNTER (OUTPATIENT)
Age: 42
End: 2022-08-08

## 2022-08-09 ENCOUNTER — TRANSCRIPTION ENCOUNTER (OUTPATIENT)
Age: 42
End: 2022-08-09

## 2022-08-10 ENCOUNTER — APPOINTMENT (OUTPATIENT)
Dept: INTERNAL MEDICINE | Facility: CLINIC | Age: 42
End: 2022-08-10

## 2022-08-10 ENCOUNTER — TRANSCRIPTION ENCOUNTER (OUTPATIENT)
Age: 42
End: 2022-08-10

## 2022-08-10 DIAGNOSIS — F41.9 ANXIETY DISORDER, UNSPECIFIED: ICD-10-CM

## 2022-08-10 DIAGNOSIS — G47.00 INSOMNIA, UNSPECIFIED: ICD-10-CM

## 2022-08-10 PROCEDURE — 99443: CPT | Mod: GC

## 2022-08-11 ENCOUNTER — TRANSCRIPTION ENCOUNTER (OUTPATIENT)
Age: 42
End: 2022-08-11

## 2022-08-16 ENCOUNTER — TRANSCRIPTION ENCOUNTER (OUTPATIENT)
Age: 42
End: 2022-08-16

## 2022-09-12 ENCOUNTER — APPOINTMENT (OUTPATIENT)
Dept: INTERNAL MEDICINE | Facility: CLINIC | Age: 42
End: 2022-09-12

## 2022-09-13 ENCOUNTER — TRANSCRIPTION ENCOUNTER (OUTPATIENT)
Age: 42
End: 2022-09-13

## 2022-10-10 ENCOUNTER — APPOINTMENT (OUTPATIENT)
Dept: INTERNAL MEDICINE | Facility: CLINIC | Age: 42
End: 2022-10-10

## 2022-10-10 VITALS
RESPIRATION RATE: 12 BRPM | WEIGHT: 153 LBS | HEIGHT: 67.72 IN | HEART RATE: 80 BPM | BODY MASS INDEX: 23.46 KG/M2 | SYSTOLIC BLOOD PRESSURE: 105 MMHG | TEMPERATURE: 99 F | OXYGEN SATURATION: 99 % | DIASTOLIC BLOOD PRESSURE: 71 MMHG

## 2022-10-10 DIAGNOSIS — Z01.818 ENCOUNTER FOR OTHER PREPROCEDURAL EXAMINATION: ICD-10-CM

## 2022-10-10 DIAGNOSIS — M25.551 PAIN IN RIGHT HIP: ICD-10-CM

## 2022-10-10 PROCEDURE — 36415 COLL VENOUS BLD VENIPUNCTURE: CPT

## 2022-10-10 PROCEDURE — 93000 ELECTROCARDIOGRAM COMPLETE: CPT

## 2022-10-10 PROCEDURE — 99214 OFFICE O/P EST MOD 30 MIN: CPT | Mod: GC,25

## 2022-10-10 RX ORDER — DULOXETINE HYDROCHLORIDE 20 MG/1
20 CAPSULE, DELAYED RELEASE PELLETS ORAL TWICE DAILY
Qty: 60 | Refills: 2 | Status: DISCONTINUED | COMMUNITY
Start: 2022-07-14 | End: 2022-10-10

## 2022-10-12 LAB
ANION GAP SERPL CALC-SCNC: 11 MMOL/L
APTT BLD: 29 SEC
BUN SERPL-MCNC: 13 MG/DL
CALCIUM SERPL-MCNC: 9.4 MG/DL
CHLORIDE SERPL-SCNC: 102 MMOL/L
CO2 SERPL-SCNC: 26 MMOL/L
CREAT SERPL-MCNC: 1 MG/DL
EGFR: 73 ML/MIN/1.73M2
GLUCOSE SERPL-MCNC: 90 MG/DL
INR PPP: 0.93 RATIO
POTASSIUM SERPL-SCNC: 4.4 MMOL/L
PT BLD: 10.8 SEC
SODIUM SERPL-SCNC: 139 MMOL/L

## 2022-10-12 NOTE — END OF VISIT
[] : Resident [FreeTextEntry3] : Here for preop for R hip CHEYANNE\par No ACS sx at this time, pror sx no problem with anesthesia\par RCRI 1, Class II risk \par Mets limited by pain, no ACS sx \par Will obtain CBC, Coags, BMP, per ortho preop \par EKG with NSR, Labs WNL\par Can proceed to OR without further cardiac workup \par Patient medically optimized for procedure\par \par Of note, states she was rec to undergo workup for POTs in the past \par Has tolerated prior surgeries with no issues, has occassionally dizziness in the AM, EKG with NSR, no CP, palpitations\par Will give nonurgent cardiology referral

## 2022-10-12 NOTE — ASSESSMENT
[No Further Testing Recommended] : no further testing recommended [Patient Optimized for Surgery] : Patient optimized for surgery [FreeTextEntry4] : 41yo F w/ a PMHx of endometriosis, Sjogern's syndrome, migraine HAs, R. nephrectomy 2012, restless leg syndrome, and insomnia who presents for pro-op optimization for upcoming hip surgery on 10/21/22.\par \par #Pre-Op Optimization\par Patient planning for right periacetabular osteotomy on 10/21/22. She reports no chest pain or history of recent PCI (<1y), HF, valvular disease, pulmonary hypertension, arrhythmias, advanced COPD, or acute liver failure. \par -EKG: NSR with no ST deviations\par -f/u CBC, BMP, PTT/PT/INR\par -METs: >4\par -[place] 0%, RCRI 1 point, class II risk, 6% 30-day risk of death, MI, or cardiac arrest\par -patient low-risk for intermediate-risk procedure. Patient can proceed without further cardiac testing

## 2022-10-12 NOTE — HISTORY OF PRESENT ILLNESS
[No Pertinent Cardiac History] : no history of aortic stenosis, atrial fibrillation, coronary artery disease, recent myocardial infarction, or implantable device/pacemaker [No Pertinent Pulmonary History] : no history of asthma, COPD, sleep apnea, or smoking [No Adverse Anesthesia Reaction] : no adverse anesthesia reaction in self or family member [____ METs%] : [unfilled] METs% [Good (7-10 METs)] : Good (7-10 METs) [(Patient denies any chest pain, claudication, dyspnea on exertion, orthopnea, palpitations or syncope)] : Patient denies any chest pain, claudication, dyspnea on exertion, orthopnea, palpitations or syncope [Chronic Anticoagulation] : no chronic anticoagulation [Chronic Kidney Disease] : no chronic kidney disease [Diabetes] : no diabetes [FreeTextEntry4] : 41yo F w/ a PMHx of endometriosis, Sjogern's syndrome, migraine HAs, R. nephrectomy 2012, restless leg syndrome, and insomnia who presents for pre-op optimization for upcoming hip surgery on 10/21/22. Patient planned for right periacetabular osteotomy on 10/21/22. She reports no chest pain or history of recent PCI (<1y), HF, valvular disease, pulmonary hypertension, arrhythmias, advanced COPD, or acute liver failure. She is able to walk up two flights of stairs without any chest pain, sob, or dyspnea. She does not take any antiplatelet or anticoagulation medication. Denies fevers, headaches, dizziness, change in vision, chest pain, sob, abdominal pain, n/v/d.

## 2022-10-20 ENCOUNTER — TRANSCRIPTION ENCOUNTER (OUTPATIENT)
Age: 42
End: 2022-10-20

## 2022-10-20 ENCOUNTER — APPOINTMENT (OUTPATIENT)
Dept: INTERNAL MEDICINE | Facility: CLINIC | Age: 42
End: 2022-10-20

## 2022-10-20 ENCOUNTER — NON-APPOINTMENT (OUTPATIENT)
Age: 42
End: 2022-10-20

## 2022-10-20 VITALS
RESPIRATION RATE: 16 BRPM | SYSTOLIC BLOOD PRESSURE: 108 MMHG | OXYGEN SATURATION: 99 % | DIASTOLIC BLOOD PRESSURE: 70 MMHG | TEMPERATURE: 98 F | HEART RATE: 80 BPM

## 2022-10-20 VITALS
HEIGHT: 68 IN | SYSTOLIC BLOOD PRESSURE: 107 MMHG | RESPIRATION RATE: 20 BRPM | HEART RATE: 63 BPM | DIASTOLIC BLOOD PRESSURE: 72 MMHG | TEMPERATURE: 97 F | WEIGHT: 155.65 LBS | OXYGEN SATURATION: 99 %
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
## 2022-10-20 DIAGNOSIS — T63.481A TOXIC EFFECT OF VENOM OF OTHER ARTHROPOD, ACCIDENTAL (UNINTENTIONAL), INITIAL ENCOUNTER: ICD-10-CM

## 2022-10-20 DIAGNOSIS — L29.9 PRURITUS, UNSPECIFIED: ICD-10-CM

## 2022-10-20 DIAGNOSIS — T63.461A TOXIC EFFECT OF VENOM OF WASPS, ACCIDENTAL (UNINTENTIONAL), INITIAL ENCOUNTER: ICD-10-CM

## 2022-10-20 PROCEDURE — 99213 OFFICE O/P EST LOW 20 MIN: CPT

## 2022-10-20 RX ORDER — POVIDONE-IODINE 5 %
1 AEROSOL (ML) TOPICAL ONCE
Refills: 0 | Status: COMPLETED | OUTPATIENT
Start: 2022-10-21 | End: 2022-10-21

## 2022-10-20 NOTE — H&P ADULT - NSICDXFAMILYHX_GEN_ALL_CORE_FT
FAMILY HISTORY:  Father  Still living? Yes, Estimated age: 71-80  Family history of hypertension, Age at diagnosis: Age Unknown

## 2022-10-20 NOTE — H&P ADULT - PROBLEM SELECTOR PLAN 1
Admit to Orthopaedic Service.  Presents today for right hip arthroscopy, labral repair, femoral neck osteoplasty, acetabuloplasty, open periacetabular osteotomy and right hip NORIS  Pt medically stable and cleared for procedure today by Dr. Lee

## 2022-10-20 NOTE — H&P ADULT - NSHPLABSRESULTS_GEN_ALL_CORE
Preop BMP, PT/PTT/INR, UA within normal limits- reviewed by medical clearance.   T&S DOS  CBC DOS  COVID negative preop  Cr 1.0 preop  Preop EKG: NSR, reviewed by medical clearance.

## 2022-10-20 NOTE — PATIENT PROFILE ADULT - FALL HARM RISK - UNIVERSAL INTERVENTIONS
Bed in lowest position, wheels locked, appropriate side rails in place/Call bell, personal items and telephone in reach/Instruct patient to call for assistance before getting out of bed or chair/Non-slip footwear when patient is out of bed/[place] to call system/Physically safe environment - no spills, clutter or unnecessary equipment/Purposeful Proactive Rounding/Room/bathroom lighting operational, light cord in reach

## 2022-10-20 NOTE — H&P ADULT - NSICDXPASTSURGICALHX_GEN_ALL_CORE_FT
PAST SURGICAL HISTORY:  Congenital renal anomaly Right kidney corrective procedure (?), ureteral stent placment 2008.    History of appendectomy     History of arthroscopy of shoulder Right Labrum Tear Repair-2012    History of nephrectomy, unilateral right kidney 2010    S/P laparoscopic procedure for endometriosis affecting bladder/colon/with ovary adhesion to uterus    Status post colon resection     [place] teeth extracted

## 2022-10-20 NOTE — H&P ADULT - NSHPPHYSICALEXAM_GEN_ALL_CORE
PE: Decreased ROM to right hip secondary to pain.    Rest of PE per medical clearance. PE: Decreased ROM to right hip secondary to pain.  Skin warm and well perfused, no visible wounds/erythema/ecchymoses  EHL/FHL/TA/GS 5/5 motor strength bilateral lower extremities   SLT in tact to distal BLE   DP pulses palpable bilaterally   Remainder of PE per medical clearance.

## 2022-10-20 NOTE — H&P ADULT - HISTORY OF PRESENT ILLNESS
42F with right hip pain x  Presents today for right hip arthroscopy, labral repair, femoral neck osteoplasty, acetabuloplasty, open periacetabular osteotomy and right hip NORIS.  42F with right hip pain x chronic. Pt denies preceding trauma/injury. Pt reports localized right hip pain for which she takes tylenol as needed. She denies numbness/tingling of bilateral lower extremities. Reports intermittent weakness of her right lower extremity secondary to pain. Pt does not ambulate with an assistive device at baseline. Pt reports hx of superficial RUE thrombosis approximately 3 years ago after a left shoulder arthroscopy - was not put on AC. Denies CP, SOB, N/V. tactile fevers, calf pain. Pt states she had a left hip cortisone injection on Monday.    Presents today for right hip arthroscopy, labral repair, femoral neck osteoplasty, acetabuloplasty, open periacetabular osteotomy and right hip NORIS.

## 2022-10-20 NOTE — H&P ADULT - NSICDXPASTMEDICALHX_GEN_ALL_CORE_FT
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    Insomnia     Migraines     Panic disorder     Sjogren's syndrome     UPJ stricture, acquired

## 2022-10-20 NOTE — PATIENT PROFILE ADULT - INFLUENZA IMMUNIZATION DATE (APPROXIMATE)
24-Sep-2022 Cheek Interpolation Flap Text: A decision was made to reconstruct the defect utilizing an interpolation axial flap and a staged reconstruction.  A telfa template was made of the defect.  This telfa template was then used to outline the Cheek Interpolation flap.  The donor area for the pedicle flap was then injected with anesthesia.  The flap was excised through the skin and subcutaneous tissue down to the layer of the underlying musculature.  The interpolation flap was carefully excised within this deep plane to maintain its blood supply.  The edges of the donor site were undermined.   The donor site was closed in a primary fashion.  The pedicle was then rotated into position and sutured.  Once the tube was sutured into place, adequate blood supply was confirmed with blanching and refill.  The pedicle was then wrapped with xeroform gauze and dressed appropriately with a telfa and gauze bandage to ensure continued blood supply and protect the attached pedicle.

## 2022-10-20 NOTE — PRE-OP CHECKLIST - SELECT TESTS ORDERED
BMP/CMP/PT/PTT/INR/EKG/COVID-19 BMP/CMP/PT/PTT/INR/Type and Screen/EKG/COVID-19 ucg - negative/BMP/CMP/PT/PTT/INR/Type and Screen/EKG/COVID-19

## 2022-10-21 ENCOUNTER — TRANSCRIPTION ENCOUNTER (OUTPATIENT)
Age: 42
End: 2022-10-21

## 2022-10-21 ENCOUNTER — NON-APPOINTMENT (OUTPATIENT)
Age: 42
End: 2022-10-21

## 2022-10-21 ENCOUNTER — INPATIENT (INPATIENT)
Facility: HOSPITAL | Age: 42
LOS: 6 days | Discharge: HOME HEALTH | DRG: 481 | End: 2022-10-28
Attending: ORTHOPAEDIC SURGERY | Admitting: ORTHOPAEDIC SURGERY
Payer: COMMERCIAL

## 2022-10-21 DIAGNOSIS — N80.9 ENDOMETRIOSIS, UNSPECIFIED: ICD-10-CM

## 2022-10-21 DIAGNOSIS — K08.409 PARTIAL LOSS OF TEETH, UNSPECIFIED CAUSE, UNSPECIFIED CLASS: Chronic | ICD-10-CM

## 2022-10-21 DIAGNOSIS — M35.00 SJOGREN SYNDROME, UNSPECIFIED: ICD-10-CM

## 2022-10-21 DIAGNOSIS — N13.5 CROSSING VESSEL AND STRICTURE OF URETER WITHOUT HYDRONEPHROSIS: ICD-10-CM

## 2022-10-21 DIAGNOSIS — Z98.890 OTHER SPECIFIED POSTPROCEDURAL STATES: Chronic | ICD-10-CM

## 2022-10-21 DIAGNOSIS — Z90.5 ACQUIRED ABSENCE OF KIDNEY: Chronic | ICD-10-CM

## 2022-10-21 DIAGNOSIS — Z90.49 ACQUIRED ABSENCE OF OTHER SPECIFIED PARTS OF DIGESTIVE TRACT: Chronic | ICD-10-CM

## 2022-10-21 DIAGNOSIS — F41.8 OTHER SPECIFIED ANXIETY DISORDERS: ICD-10-CM

## 2022-10-21 DIAGNOSIS — Q65.89 OTHER SPECIFIED CONGENITAL DEFORMITIES OF HIP: ICD-10-CM

## 2022-10-21 DIAGNOSIS — Z98.89 OTHER SPECIFIED POSTPROCEDURAL STATES: Chronic | ICD-10-CM

## 2022-10-21 DIAGNOSIS — G43.909 MIGRAINE, UNSPECIFIED, NOT INTRACTABLE, WITHOUT STATUS MIGRAINOSUS: ICD-10-CM

## 2022-10-21 DIAGNOSIS — Q63.9 CONGENITAL MALFORMATION OF KIDNEY, UNSPECIFIED: Chronic | ICD-10-CM

## 2022-10-21 LAB
ANION GAP SERPL CALC-SCNC: 8 MMOL/L — SIGNIFICANT CHANGE UP (ref 5–17)
BLD GP AB SCN SERPL QL: NEGATIVE — SIGNIFICANT CHANGE UP
BUN SERPL-MCNC: 15 MG/DL — SIGNIFICANT CHANGE UP (ref 7–23)
CALCIUM SERPL-MCNC: 9.5 MG/DL — SIGNIFICANT CHANGE UP (ref 8.4–10.5)
CHLORIDE SERPL-SCNC: 104 MMOL/L — SIGNIFICANT CHANGE UP (ref 96–108)
CO2 SERPL-SCNC: 28 MMOL/L — SIGNIFICANT CHANGE UP (ref 22–31)
CREAT SERPL-MCNC: 1.1 MG/DL — SIGNIFICANT CHANGE UP (ref 0.5–1.3)
EGFR: 65 ML/MIN/1.73M2 — SIGNIFICANT CHANGE UP
GLUCOSE SERPL-MCNC: 89 MG/DL — SIGNIFICANT CHANGE UP (ref 70–99)
HCT VFR BLD CALC: 41.9 % — SIGNIFICANT CHANGE UP (ref 34.5–45)
HGB BLD-MCNC: 14 G/DL — SIGNIFICANT CHANGE UP (ref 11.5–15.5)
MCHC RBC-ENTMCNC: 28.5 PG — SIGNIFICANT CHANGE UP (ref 27–34)
MCHC RBC-ENTMCNC: 33.4 GM/DL — SIGNIFICANT CHANGE UP (ref 32–36)
MCV RBC AUTO: 85.3 FL — SIGNIFICANT CHANGE UP (ref 80–100)
NRBC # BLD: 0 /100 WBCS — SIGNIFICANT CHANGE UP (ref 0–0)
PLATELET # BLD AUTO: 284 K/UL — SIGNIFICANT CHANGE UP (ref 150–400)
POTASSIUM SERPL-MCNC: 4.8 MMOL/L — SIGNIFICANT CHANGE UP (ref 3.5–5.3)
POTASSIUM SERPL-SCNC: 4.8 MMOL/L — SIGNIFICANT CHANGE UP (ref 3.5–5.3)
RBC # BLD: 4.91 M/UL — SIGNIFICANT CHANGE UP (ref 3.8–5.2)
RBC # FLD: 12.1 % — SIGNIFICANT CHANGE UP (ref 10.3–14.5)
RH IG SCN BLD-IMP: POSITIVE — SIGNIFICANT CHANGE UP
SODIUM SERPL-SCNC: 140 MMOL/L — SIGNIFICANT CHANGE UP (ref 135–145)
WBC # BLD: 8.38 K/UL — SIGNIFICANT CHANGE UP (ref 3.8–10.5)
WBC # FLD AUTO: 8.38 K/UL — SIGNIFICANT CHANGE UP (ref 3.8–10.5)

## 2022-10-21 DEVICE — IMPLANTABLE DEVICE: Type: IMPLANTABLE DEVICE | Status: FUNCTIONAL

## 2022-10-21 DEVICE — ANCHOR GUIDE Q FIX 1.8 MINI: Type: IMPLANTABLE DEVICE | Status: FUNCTIONAL

## 2022-10-21 RX ORDER — POLYETHYLENE GLYCOL 3350 17 G/17G
17 POWDER, FOR SOLUTION ORAL AT BEDTIME
Refills: 0 | Status: DISCONTINUED | OUTPATIENT
Start: 2022-10-21 | End: 2022-10-28

## 2022-10-21 RX ORDER — DIAZEPAM 5 MG
5 TABLET ORAL EVERY 8 HOURS
Refills: 0 | Status: DISCONTINUED | OUTPATIENT
Start: 2022-10-21 | End: 2022-10-26

## 2022-10-21 RX ORDER — MORPHINE SULFATE 50 MG/1
2 CAPSULE, EXTENDED RELEASE ORAL EVERY 4 HOURS
Refills: 0 | Status: DISCONTINUED | OUTPATIENT
Start: 2022-10-21 | End: 2022-10-26

## 2022-10-21 RX ORDER — CELECOXIB 200 MG/1
200 CAPSULE ORAL ONCE
Refills: 0 | Status: COMPLETED | OUTPATIENT
Start: 2022-10-21 | End: 2022-10-21

## 2022-10-21 RX ORDER — ACETAMINOPHEN 500 MG
975 TABLET ORAL EVERY 8 HOURS
Refills: 0 | Status: DISCONTINUED | OUTPATIENT
Start: 2022-10-21 | End: 2022-10-28

## 2022-10-21 RX ORDER — MAGNESIUM HYDROXIDE 400 MG/1
30 TABLET, CHEWABLE ORAL DAILY
Refills: 0 | Status: DISCONTINUED | OUTPATIENT
Start: 2022-10-21 | End: 2022-10-28

## 2022-10-21 RX ORDER — ACETAMINOPHEN 500 MG
1000 TABLET ORAL ONCE
Refills: 0 | Status: COMPLETED | OUTPATIENT
Start: 2022-10-21 | End: 2022-10-21

## 2022-10-21 RX ORDER — ASPIRIN/CALCIUM CARB/MAGNESIUM 324 MG
81 TABLET ORAL DAILY
Refills: 0 | Status: DISCONTINUED | OUTPATIENT
Start: 2022-10-21 | End: 2022-10-26

## 2022-10-21 RX ORDER — HYDROXYCHLOROQUINE SULFATE 200 MG
200 TABLET ORAL DAILY
Refills: 0 | Status: DISCONTINUED | OUTPATIENT
Start: 2022-10-21 | End: 2022-10-28

## 2022-10-21 RX ORDER — GABAPENTIN 400 MG/1
200 CAPSULE ORAL AT BEDTIME
Refills: 0 | Status: DISCONTINUED | OUTPATIENT
Start: 2022-10-21 | End: 2022-10-27

## 2022-10-21 RX ORDER — ALPRAZOLAM 0.25 MG
0 TABLET ORAL
Qty: 0 | Refills: 0 | DISCHARGE

## 2022-10-21 RX ORDER — OXYCODONE HYDROCHLORIDE 5 MG/1
10 TABLET ORAL EVERY 4 HOURS
Refills: 0 | Status: DISCONTINUED | OUTPATIENT
Start: 2022-10-21 | End: 2022-10-27

## 2022-10-21 RX ORDER — OXYCODONE HYDROCHLORIDE 5 MG/1
10 TABLET ORAL EVERY 12 HOURS
Refills: 0 | Status: DISCONTINUED | OUTPATIENT
Start: 2022-10-21 | End: 2022-10-21

## 2022-10-21 RX ORDER — DOXAZOSIN MESYLATE 4 MG
1 TABLET ORAL
Refills: 0 | Status: DISCONTINUED | OUTPATIENT
Start: 2022-10-21 | End: 2022-10-21

## 2022-10-21 RX ORDER — PRAZOSIN HCL 2 MG
1 CAPSULE ORAL
Qty: 0 | Refills: 0 | DISCHARGE

## 2022-10-21 RX ORDER — DOCUSATE SODIUM 100 MG
0 CAPSULE ORAL
Qty: 0 | Refills: 0 | DISCHARGE

## 2022-10-21 RX ORDER — NARATRIPTAN HYDROCHLORIDE 1 MG/1
1 TABLET, FILM COATED ORAL
Qty: 0 | Refills: 0 | DISCHARGE

## 2022-10-21 RX ORDER — ONDANSETRON 8 MG/1
4 TABLET, FILM COATED ORAL EVERY 6 HOURS
Refills: 0 | Status: DISCONTINUED | OUTPATIENT
Start: 2022-10-21 | End: 2022-10-28

## 2022-10-21 RX ORDER — GABAPENTIN 400 MG/1
100 CAPSULE ORAL ONCE
Refills: 0 | Status: COMPLETED | OUTPATIENT
Start: 2022-10-21 | End: 2022-10-21

## 2022-10-21 RX ORDER — CEFAZOLIN SODIUM 1 G
2000 VIAL (EA) INJECTION EVERY 8 HOURS
Refills: 0 | Status: COMPLETED | OUTPATIENT
Start: 2022-10-22 | End: 2022-10-22

## 2022-10-21 RX ORDER — DIPHENHYDRAMINE HCL 50 MG
25 CAPSULE ORAL EVERY 6 HOURS
Refills: 0 | Status: DISCONTINUED | OUTPATIENT
Start: 2022-10-21 | End: 2022-10-28

## 2022-10-21 RX ORDER — ALPRAZOLAM 0.25 MG
0.5 TABLET ORAL DAILY
Refills: 0 | Status: DISCONTINUED | OUTPATIENT
Start: 2022-10-21 | End: 2022-10-21

## 2022-10-21 RX ORDER — SENNA PLUS 8.6 MG/1
2 TABLET ORAL AT BEDTIME
Refills: 0 | Status: DISCONTINUED | OUTPATIENT
Start: 2022-10-21 | End: 2022-10-28

## 2022-10-21 RX ORDER — SCOPALAMINE 1 MG/3D
1 PATCH, EXTENDED RELEASE TRANSDERMAL ONCE
Refills: 0 | Status: COMPLETED | OUTPATIENT
Start: 2022-10-21 | End: 2022-10-21

## 2022-10-21 RX ORDER — SODIUM CHLORIDE 9 MG/ML
1000 INJECTION, SOLUTION INTRAVENOUS
Refills: 0 | Status: DISCONTINUED | OUTPATIENT
Start: 2022-10-22 | End: 2022-10-28

## 2022-10-21 RX ORDER — OXYCODONE HYDROCHLORIDE 5 MG/1
5 TABLET ORAL EVERY 4 HOURS
Refills: 0 | Status: DISCONTINUED | OUTPATIENT
Start: 2022-10-21 | End: 2022-10-27

## 2022-10-21 RX ADMIN — Medication 1 APPLICATION(S): at 10:25

## 2022-10-21 RX ADMIN — OXYCODONE HYDROCHLORIDE 10 MILLIGRAM(S): 5 TABLET ORAL at 22:52

## 2022-10-21 RX ADMIN — SCOPALAMINE 1 PATCH: 1 PATCH, EXTENDED RELEASE TRANSDERMAL at 12:05

## 2022-10-21 RX ADMIN — MORPHINE SULFATE 2 MILLIGRAM(S): 50 CAPSULE, EXTENDED RELEASE ORAL at 21:20

## 2022-10-21 RX ADMIN — OXYCODONE HYDROCHLORIDE 10 MILLIGRAM(S): 5 TABLET ORAL at 10:37

## 2022-10-21 RX ADMIN — GABAPENTIN 200 MILLIGRAM(S): 400 CAPSULE ORAL at 22:52

## 2022-10-21 RX ADMIN — SENNA PLUS 2 TABLET(S): 8.6 TABLET ORAL at 22:53

## 2022-10-21 RX ADMIN — OXYCODONE HYDROCHLORIDE 10 MILLIGRAM(S): 5 TABLET ORAL at 23:52

## 2022-10-21 RX ADMIN — CELECOXIB 200 MILLIGRAM(S): 200 CAPSULE ORAL at 10:35

## 2022-10-21 RX ADMIN — GABAPENTIN 100 MILLIGRAM(S): 400 CAPSULE ORAL at 10:35

## 2022-10-21 RX ADMIN — Medication 1000 MILLIGRAM(S): at 10:35

## 2022-10-21 RX ADMIN — MORPHINE SULFATE 2 MILLIGRAM(S): 50 CAPSULE, EXTENDED RELEASE ORAL at 21:35

## 2022-10-21 RX ADMIN — POLYETHYLENE GLYCOL 3350 17 GRAM(S): 17 POWDER, FOR SOLUTION ORAL at 22:53

## 2022-10-21 NOTE — BRIEF OPERATIVE NOTE - NSICDXBRIEFPROCEDURE_GEN_ALL_CORE_FT
PROCEDURES:  Periacetabular osteotomy 21-Oct-2022 11:36:00  Tran Gonzalez  Right hip arthroscopy 21-Oct-2022 11:36:29  Tran Gonzalez

## 2022-10-21 NOTE — PRE-ANESTHESIA EVALUATION ADULT - NSANTHADDINFOFT_GEN_ALL_CORE
R/B/A discussed with patient including risks of parasthesia and nerve injury. All questions answered.

## 2022-10-22 RX ORDER — LANOLIN ALCOHOL/MO/W.PET/CERES
5 CREAM (GRAM) TOPICAL AT BEDTIME
Refills: 0 | Status: DISCONTINUED | OUTPATIENT
Start: 2022-10-22 | End: 2022-10-28

## 2022-10-22 RX ADMIN — OXYCODONE HYDROCHLORIDE 10 MILLIGRAM(S): 5 TABLET ORAL at 07:30

## 2022-10-22 RX ADMIN — Medication 5 MILLIGRAM(S): at 21:50

## 2022-10-22 RX ADMIN — OXYCODONE HYDROCHLORIDE 10 MILLIGRAM(S): 5 TABLET ORAL at 22:30

## 2022-10-22 RX ADMIN — Medication 975 MILLIGRAM(S): at 11:16

## 2022-10-22 RX ADMIN — Medication 10 MILLIGRAM(S): at 06:35

## 2022-10-22 RX ADMIN — SCOPALAMINE 1 PATCH: 1 PATCH, EXTENDED RELEASE TRANSDERMAL at 07:40

## 2022-10-22 RX ADMIN — Medication 81 MILLIGRAM(S): at 10:15

## 2022-10-22 RX ADMIN — OXYCODONE HYDROCHLORIDE 10 MILLIGRAM(S): 5 TABLET ORAL at 19:06

## 2022-10-22 RX ADMIN — OXYCODONE HYDROCHLORIDE 10 MILLIGRAM(S): 5 TABLET ORAL at 14:38

## 2022-10-22 RX ADMIN — Medication 5 MILLIGRAM(S): at 00:24

## 2022-10-22 RX ADMIN — Medication 975 MILLIGRAM(S): at 01:06

## 2022-10-22 RX ADMIN — Medication 100 MILLIGRAM(S): at 10:16

## 2022-10-22 RX ADMIN — OXYCODONE HYDROCHLORIDE 10 MILLIGRAM(S): 5 TABLET ORAL at 22:36

## 2022-10-22 RX ADMIN — Medication 10 MILLIGRAM(S): at 05:35

## 2022-10-22 RX ADMIN — Medication 200 MILLIGRAM(S): at 06:31

## 2022-10-22 RX ADMIN — Medication 975 MILLIGRAM(S): at 18:07

## 2022-10-22 RX ADMIN — Medication 100 MILLIGRAM(S): at 01:07

## 2022-10-22 RX ADMIN — SCOPALAMINE 1 PATCH: 1 PATCH, EXTENDED RELEASE TRANSDERMAL at 18:23

## 2022-10-22 RX ADMIN — Medication 1 TABLET(S): at 10:15

## 2022-10-22 RX ADMIN — Medication 975 MILLIGRAM(S): at 19:07

## 2022-10-22 RX ADMIN — OXYCODONE HYDROCHLORIDE 10 MILLIGRAM(S): 5 TABLET ORAL at 18:06

## 2022-10-22 RX ADMIN — Medication 5 MILLIGRAM(S): at 01:31

## 2022-10-22 RX ADMIN — Medication 975 MILLIGRAM(S): at 10:16

## 2022-10-22 RX ADMIN — OXYCODONE HYDROCHLORIDE 10 MILLIGRAM(S): 5 TABLET ORAL at 06:30

## 2022-10-22 RX ADMIN — GABAPENTIN 200 MILLIGRAM(S): 400 CAPSULE ORAL at 21:50

## 2022-10-22 RX ADMIN — Medication 975 MILLIGRAM(S): at 02:06

## 2022-10-22 RX ADMIN — OXYCODONE HYDROCHLORIDE 10 MILLIGRAM(S): 5 TABLET ORAL at 13:38

## 2022-10-22 NOTE — PHYSICAL THERAPY INITIAL EVALUATION ADULT - NEUROVASCULAR ASSESSMENT RLE
Patient reporting absent/significantly reduced sensation around R hip and from R patella to R pelvis/numbness present/warm/no tingling/no discoloration

## 2022-10-22 NOTE — PROGRESS NOTE ADULT - SUBJECTIVE AND OBJECTIVE BOX
Ortho Post Op Check    Procedure: R CHEYANNE   Surgeon: Brennon     Pt comfortable without complaints, pain controlled  Denies CP, SOB, N/V, numbness/tingling     Vital Signs Last 24 Hrs  T(C): 36.8 (10-22-22 @ 00:28), Max: 36.8 (10-22-22 @ 00:28)  T(F): 98.3 (10-22-22 @ 00:28), Max: 98.3 (10-22-22 @ 00:28)  HR: 57 (10-22-22 @ 00:28) (57 - 67)  BP: 107/70 (10-22-22 @ 00:28) (104/63 - 116/72)  BP(mean): 89 (10-21-22 @ 21:00) (78 - 89)  RR: 17 (10-22-22 @ 00:28) (11 - 20)  SpO2: 100% (10-22-22 @ 00:28) (99% - 100%)    General: Pt Alert and oriented, NAD  DSG C/D/I  Pulses: 2+ dP   Sensation: silt sural/saph/dpn/spn/tib   Motor: 5/5 ehl/fhl/ta/gs      A/P: 42yFemale s/p R CHEYANNE   by Dr. SANDOR Mueller on 10-22  - Stable  - Pain Control  - DVT ppx: asa   - Post op abx: ancef  - WBS: foot flat touch down   - PT eval     Ortho Pager [phone]

## 2022-10-22 NOTE — PHYSICAL THERAPY INITIAL EVALUATION ADULT - THERAPY FREQUENCY, PT EVAL
daily
FAMILY HISTORY:  Father  Still living? Unknown  FH: heart disease, Age at diagnosis: Age Unknown    Mother  Still living? Unknown  FHx: ovarian cancer, Age at diagnosis: Age Unknown    Sibling  Still living? Unknown  FH: breast cancer, Age at diagnosis: Age Unknown

## 2022-10-22 NOTE — PHYSICAL THERAPY INITIAL EVALUATION ADULT - NSPTDISCHREC_GEN_A_CORE
pending further progress with all functional mobility and adherence to TTWB precautions.. Unable to assess gait training 2/2 hypotensive BP./Home PT

## 2022-10-22 NOTE — PROGRESS NOTE ADULT - SUBJECTIVE AND OBJECTIVE BOX
Ortho AM Note    Procedure: R CHEYNANE   Surgeon: Brennon     Pt comfortable without complaints, pain controlled  Denies CP, SOB, N/V, numbness/tingling     Orthostatic VS    10-22-22 @ 11:35  Lying BP: 113/72 HR: --   Sitting BP: 98/66 HR: --  Standing BP: --/-- HR: --  Site: upper left arm   Mode: electronic      General: Pt Alert and oriented, NAD  DSG C/D/I  Pulses: 2+ dP   Sensation: silt sural/saph/dpn/spn/tib   Motor: 5/5 ehl/fhl/ta/gs      A/P: 42yFemale s/p R CHEYANNE   by Dr. SANDOR Mueller on 10-22  - Stable  - Pain Control  - DVT ppx: asa   - Post op abx: ancef  - WBS: foot flat touch down   - PT eval     Ortho Pager [phone]

## 2022-10-22 NOTE — PHYSICAL THERAPY INITIAL EVALUATION ADULT - TRANSFER SAFETY CONCERNS NOTED: SIT/STAND, REHAB EVAL
Demo fair eccentric control during descend. Tolerated standing for ~2 minutes before patient reporting increasing lightheadedness/dizziness. Attempted to obtain standing BP, however, patient requesting to sit EOB.

## 2022-10-22 NOTE — PHYSICAL THERAPY INITIAL EVALUATION ADULT - ADDITIONAL COMMENTS
Patient is a community ambulator who lives with a roommate in an elevator access apartment with 1 KRYSTLE and another step inside to enter bathroom. Patient is independent with all ADLs and denies use of AD when ambulating.

## 2022-10-22 NOTE — PHYSICAL THERAPY INITIAL EVALUATION ADULT - ACTIVE RANGE OF MOTION EXAMINATION, REHAB EVAL
except for R hip ROM- not assessed 2/2 hip precautions (Hip ROM 0-90 degrees, 20 degrees in all other planes (ie abduction, adduction))/bilateral upper extremity Active ROM was WFL (within functional limits)/bilateral  lower extremity Active ROM was WFL (within functional limits)

## 2022-10-22 NOTE — PHYSICAL THERAPY INITIAL EVALUATION ADULT - NS ASR WT BEARING DETAIL RLE
foot flat TTWB; Hip ROM 0-90 degrees, 20 degrees in all other planes (ie abduction, adduction)/toe touch weight-bearing

## 2022-10-23 LAB
ANION GAP SERPL CALC-SCNC: 7 MMOL/L — SIGNIFICANT CHANGE UP (ref 5–17)
BUN SERPL-MCNC: 12 MG/DL — SIGNIFICANT CHANGE UP (ref 7–23)
CALCIUM SERPL-MCNC: 8.3 MG/DL — LOW (ref 8.4–10.5)
CHLORIDE SERPL-SCNC: 107 MMOL/L — SIGNIFICANT CHANGE UP (ref 96–108)
CO2 SERPL-SCNC: 28 MMOL/L — SIGNIFICANT CHANGE UP (ref 22–31)
CREAT SERPL-MCNC: 1.03 MG/DL — SIGNIFICANT CHANGE UP (ref 0.5–1.3)
EGFR: 70 ML/MIN/1.73M2 — SIGNIFICANT CHANGE UP
GLUCOSE SERPL-MCNC: 104 MG/DL — HIGH (ref 70–99)
HCT VFR BLD CALC: 34.4 % — LOW (ref 34.5–45)
HGB BLD-MCNC: 11.5 G/DL — SIGNIFICANT CHANGE UP (ref 11.5–15.5)
MCHC RBC-ENTMCNC: 28.5 PG — SIGNIFICANT CHANGE UP (ref 27–34)
MCHC RBC-ENTMCNC: 33.4 GM/DL — SIGNIFICANT CHANGE UP (ref 32–36)
MCV RBC AUTO: 85.4 FL — SIGNIFICANT CHANGE UP (ref 80–100)
NRBC # BLD: 0 /100 WBCS — SIGNIFICANT CHANGE UP (ref 0–0)
PLATELET # BLD AUTO: 188 K/UL — SIGNIFICANT CHANGE UP (ref 150–400)
POTASSIUM SERPL-MCNC: 4 MMOL/L — SIGNIFICANT CHANGE UP (ref 3.5–5.3)
POTASSIUM SERPL-SCNC: 4 MMOL/L — SIGNIFICANT CHANGE UP (ref 3.5–5.3)
RBC # BLD: 4.03 M/UL — SIGNIFICANT CHANGE UP (ref 3.8–5.2)
RBC # FLD: 11.9 % — SIGNIFICANT CHANGE UP (ref 10.3–14.5)
SODIUM SERPL-SCNC: 142 MMOL/L — SIGNIFICANT CHANGE UP (ref 135–145)
WBC # BLD: 8.66 K/UL — SIGNIFICANT CHANGE UP (ref 3.8–10.5)
WBC # FLD AUTO: 8.66 K/UL — SIGNIFICANT CHANGE UP (ref 3.8–10.5)

## 2022-10-23 RX ORDER — SIMETHICONE 80 MG/1
80 TABLET, CHEWABLE ORAL
Refills: 0 | Status: DISCONTINUED | OUTPATIENT
Start: 2022-10-23 | End: 2022-10-28

## 2022-10-23 RX ORDER — LORATADINE 10 MG/1
10 TABLET ORAL DAILY
Refills: 0 | Status: DISCONTINUED | OUTPATIENT
Start: 2022-10-23 | End: 2022-10-28

## 2022-10-23 RX ADMIN — GABAPENTIN 200 MILLIGRAM(S): 400 CAPSULE ORAL at 21:18

## 2022-10-23 RX ADMIN — OXYCODONE HYDROCHLORIDE 10 MILLIGRAM(S): 5 TABLET ORAL at 14:31

## 2022-10-23 RX ADMIN — Medication 10 MILLIGRAM(S): at 05:54

## 2022-10-23 RX ADMIN — OXYCODONE HYDROCHLORIDE 10 MILLIGRAM(S): 5 TABLET ORAL at 19:30

## 2022-10-23 RX ADMIN — Medication 1 TABLET(S): at 12:03

## 2022-10-23 RX ADMIN — Medication 975 MILLIGRAM(S): at 03:07

## 2022-10-23 RX ADMIN — Medication 975 MILLIGRAM(S): at 10:41

## 2022-10-23 RX ADMIN — Medication 200 MILLIGRAM(S): at 12:03

## 2022-10-23 RX ADMIN — Medication 975 MILLIGRAM(S): at 09:41

## 2022-10-23 RX ADMIN — Medication 975 MILLIGRAM(S): at 18:02

## 2022-10-23 RX ADMIN — Medication 81 MILLIGRAM(S): at 12:03

## 2022-10-23 RX ADMIN — Medication 975 MILLIGRAM(S): at 04:00

## 2022-10-23 RX ADMIN — Medication 5 MILLIGRAM(S): at 21:18

## 2022-10-23 RX ADMIN — OXYCODONE HYDROCHLORIDE 10 MILLIGRAM(S): 5 TABLET ORAL at 03:13

## 2022-10-23 RX ADMIN — SCOPALAMINE 1 PATCH: 1 PATCH, EXTENDED RELEASE TRANSDERMAL at 06:22

## 2022-10-23 RX ADMIN — Medication 5 MILLIGRAM(S): at 21:19

## 2022-10-23 RX ADMIN — OXYCODONE HYDROCHLORIDE 10 MILLIGRAM(S): 5 TABLET ORAL at 18:31

## 2022-10-23 RX ADMIN — OXYCODONE HYDROCHLORIDE 10 MILLIGRAM(S): 5 TABLET ORAL at 08:00

## 2022-10-23 RX ADMIN — Medication 5 MILLIGRAM(S): at 07:34

## 2022-10-23 RX ADMIN — OXYCODONE HYDROCHLORIDE 10 MILLIGRAM(S): 5 TABLET ORAL at 04:00

## 2022-10-23 RX ADMIN — OXYCODONE HYDROCHLORIDE 10 MILLIGRAM(S): 5 TABLET ORAL at 07:15

## 2022-10-23 RX ADMIN — Medication 975 MILLIGRAM(S): at 17:02

## 2022-10-23 RX ADMIN — OXYCODONE HYDROCHLORIDE 10 MILLIGRAM(S): 5 TABLET ORAL at 15:31

## 2022-10-23 RX ADMIN — Medication 10 MILLIGRAM(S): at 17:02

## 2022-10-23 RX ADMIN — SIMETHICONE 80 MILLIGRAM(S): 80 TABLET, CHEWABLE ORAL at 15:07

## 2022-10-23 NOTE — PROGRESS NOTE ADULT - SUBJECTIVE AND OBJECTIVE BOX
Ortho AM Note    Procedure: RUBIO EDWARD   Surgeon: Brennon     Pt comfortable without complaints, pain controlled. A little orthostatic with PT yesterday. Limited her session.   Denies CP, SOB, N/V, numbness/tingling     Vital Signs Last 24 Hrs  T(C): 36.7 (23 Oct 2022 04:45), Max: 36.9 (22 Oct 2022 08:30)  T(F): 98 (23 Oct 2022 04:45), Max: 98.4 (22 Oct 2022 08:30)  HR: 76 (23 Oct 2022 04:45) (64 - 76)  BP: 100/58 (23 Oct 2022 04:45) (91/66 - 113/69)  BP(mean): --  RR: 16 (23 Oct 2022 04:45) (15 - 17)  SpO2: 98% (23 Oct 2022 04:45) (98% - 99%)    Parameters below as of 23 Oct 2022 04:45  Patient On (Oxygen Delivery Method): room air      General: Pt Alert and oriented, NAD  DSG C/D/I  Pulses: 2+ dP   Sensation: silt sural/saph/dpn/spn/tib   Motor: 5/5 ehl/fhl/ta/gs      A/P: 42yFemale s/p R CHEYANNE by Dr. SANDOR Mueller on 10-22  - Stable  - Pain Control  - DVT ppx: asa   - Post op abx: ancef  - WBS: foot flat touch down   - PT eval     Ortho Pager [phone]

## 2022-10-24 ENCOUNTER — TRANSCRIPTION ENCOUNTER (OUTPATIENT)
Age: 42
End: 2022-10-24

## 2022-10-24 LAB
ANION GAP SERPL CALC-SCNC: 7 MMOL/L — SIGNIFICANT CHANGE UP (ref 5–17)
BUN SERPL-MCNC: 9 MG/DL — SIGNIFICANT CHANGE UP (ref 7–23)
CALCIUM SERPL-MCNC: 9.1 MG/DL — SIGNIFICANT CHANGE UP (ref 8.4–10.5)
CHLORIDE SERPL-SCNC: 100 MMOL/L — SIGNIFICANT CHANGE UP (ref 96–108)
CO2 SERPL-SCNC: 30 MMOL/L — SIGNIFICANT CHANGE UP (ref 22–31)
CREAT SERPL-MCNC: 1.02 MG/DL — SIGNIFICANT CHANGE UP (ref 0.5–1.3)
EGFR: 71 ML/MIN/1.73M2 — SIGNIFICANT CHANGE UP
GLUCOSE SERPL-MCNC: 99 MG/DL — SIGNIFICANT CHANGE UP (ref 70–99)
HCT VFR BLD CALC: 38.5 % — SIGNIFICANT CHANGE UP (ref 34.5–45)
HGB BLD-MCNC: 12.9 G/DL — SIGNIFICANT CHANGE UP (ref 11.5–15.5)
MCHC RBC-ENTMCNC: 28.5 PG — SIGNIFICANT CHANGE UP (ref 27–34)
MCHC RBC-ENTMCNC: 33.5 GM/DL — SIGNIFICANT CHANGE UP (ref 32–36)
MCV RBC AUTO: 85.2 FL — SIGNIFICANT CHANGE UP (ref 80–100)
NRBC # BLD: 0 /100 WBCS — SIGNIFICANT CHANGE UP (ref 0–0)
PLATELET # BLD AUTO: 230 K/UL — SIGNIFICANT CHANGE UP (ref 150–400)
POTASSIUM SERPL-MCNC: 4.5 MMOL/L — SIGNIFICANT CHANGE UP (ref 3.5–5.3)
POTASSIUM SERPL-SCNC: 4.5 MMOL/L — SIGNIFICANT CHANGE UP (ref 3.5–5.3)
RBC # BLD: 4.52 M/UL — SIGNIFICANT CHANGE UP (ref 3.8–5.2)
RBC # FLD: 11.9 % — SIGNIFICANT CHANGE UP (ref 10.3–14.5)
SODIUM SERPL-SCNC: 137 MMOL/L — SIGNIFICANT CHANGE UP (ref 135–145)
WBC # BLD: 8.73 K/UL — SIGNIFICANT CHANGE UP (ref 3.8–10.5)
WBC # FLD AUTO: 8.73 K/UL — SIGNIFICANT CHANGE UP (ref 3.8–10.5)

## 2022-10-24 RX ORDER — SODIUM CHLORIDE 9 MG/ML
1000 INJECTION INTRAMUSCULAR; INTRAVENOUS; SUBCUTANEOUS ONCE
Refills: 0 | Status: COMPLETED | OUTPATIENT
Start: 2022-10-24 | End: 2022-10-24

## 2022-10-24 RX ADMIN — OXYCODONE HYDROCHLORIDE 10 MILLIGRAM(S): 5 TABLET ORAL at 05:24

## 2022-10-24 RX ADMIN — Medication 81 MILLIGRAM(S): at 11:24

## 2022-10-24 RX ADMIN — Medication 10 MILLIGRAM(S): at 18:05

## 2022-10-24 RX ADMIN — SIMETHICONE 80 MILLIGRAM(S): 80 TABLET, CHEWABLE ORAL at 12:19

## 2022-10-24 RX ADMIN — OXYCODONE HYDROCHLORIDE 10 MILLIGRAM(S): 5 TABLET ORAL at 22:32

## 2022-10-24 RX ADMIN — Medication 5 MILLIGRAM(S): at 22:14

## 2022-10-24 RX ADMIN — Medication 975 MILLIGRAM(S): at 18:04

## 2022-10-24 RX ADMIN — LORATADINE 10 MILLIGRAM(S): 10 TABLET ORAL at 05:20

## 2022-10-24 RX ADMIN — POLYETHYLENE GLYCOL 3350 17 GRAM(S): 17 POWDER, FOR SOLUTION ORAL at 21:31

## 2022-10-24 RX ADMIN — MORPHINE SULFATE 2 MILLIGRAM(S): 50 CAPSULE, EXTENDED RELEASE ORAL at 19:09

## 2022-10-24 RX ADMIN — OXYCODONE HYDROCHLORIDE 10 MILLIGRAM(S): 5 TABLET ORAL at 21:32

## 2022-10-24 RX ADMIN — Medication 200 MILLIGRAM(S): at 11:24

## 2022-10-24 RX ADMIN — SODIUM CHLORIDE 1000 MILLILITER(S): 9 INJECTION INTRAMUSCULAR; INTRAVENOUS; SUBCUTANEOUS at 13:00

## 2022-10-24 RX ADMIN — Medication 975 MILLIGRAM(S): at 10:21

## 2022-10-24 RX ADMIN — Medication 975 MILLIGRAM(S): at 19:04

## 2022-10-24 RX ADMIN — Medication 1 TABLET(S): at 11:24

## 2022-10-24 RX ADMIN — OXYCODONE HYDROCHLORIDE 10 MILLIGRAM(S): 5 TABLET ORAL at 06:24

## 2022-10-24 RX ADMIN — OXYCODONE HYDROCHLORIDE 10 MILLIGRAM(S): 5 TABLET ORAL at 14:01

## 2022-10-24 RX ADMIN — Medication 975 MILLIGRAM(S): at 00:15

## 2022-10-24 RX ADMIN — MORPHINE SULFATE 2 MILLIGRAM(S): 50 CAPSULE, EXTENDED RELEASE ORAL at 19:20

## 2022-10-24 RX ADMIN — GABAPENTIN 200 MILLIGRAM(S): 400 CAPSULE ORAL at 21:32

## 2022-10-24 RX ADMIN — Medication 5 MILLIGRAM(S): at 21:32

## 2022-10-24 RX ADMIN — OXYCODONE HYDROCHLORIDE 10 MILLIGRAM(S): 5 TABLET ORAL at 13:01

## 2022-10-24 RX ADMIN — Medication 975 MILLIGRAM(S): at 09:21

## 2022-10-24 RX ADMIN — Medication 975 MILLIGRAM(S): at 01:15

## 2022-10-24 NOTE — DISCHARGE NOTE PROVIDER - HOSPITAL COURSE
Admit to Orthopaedics for right hip arthroscopy with debridement, periacetabular osteotomy 10/21/22   Perioperative Antibiotics  DVT prophylaxis  Physical Therapy  Pain Management Admit to Orthopaedics for right hip arthroscopy with debridement, periacetabular osteotomy 10/21/22   Perioperative Antibiotics  DVT prophylaxis  Physical Therapy  Pain Management   10-24 hypotensive with PT- x1 liter bolus given   10-26 positive for R LE DVT- started on apixaban  10-27 PM consult nutrition , consulted PM to adjust regimen

## 2022-10-24 NOTE — OCCUPATIONAL THERAPY INITIAL EVALUATION ADULT - PERTINENT HX OF CURRENT PROBLEM, REHAB EVAL
42F with right hip pain x chronic. Pt denies preceding trauma/injury. Pt reports localized right hip pain for which she takes tylenol as needed. She denies numbness/tingling of bilateral lower extremities. Reports intermittent weakness of her right lower extremity secondary to pain. Pt does not ambulate with an assistive device at baseline. Pt reports hx of superficial RUE thrombosis approximately 3 years ago after a left shoulder arthroscopy.

## 2022-10-24 NOTE — PROGRESS NOTE ADULT - SUBJECTIVE AND OBJECTIVE BOX
Ortho Note    Subjective:  Pt comfortable without complaints, pain managed with current pain medication regimen.   Denies CP, SOB, N/V, numbness/tingling   Reviewed plan of care with patient at bedside     Vital Signs Last 24 Hrs  T(C): 36.8 (10-24-22 @ 08:20), Max: 37.2 (10-24-22 @ 05:25)  T(F): 98.2 (10-24-22 @ 08:20), Max: 98.9 (10-24-22 @ 05:25)  HR: 91 (10-24-22 @ 08:20) (86 - 91)  BP: 105/66 (10-24-22 @ 08:20) (105/66 - 111/71)  BP(mean): --  RR: 17 (10-24-22 @ 08:20) (16 - 17)  SpO2: 95% (10-24-22 @ 08:20) (95% - 98%)  AVSS    Objective:    Physical Exam:  General: Pt Alert and oriented, NAD  Right Hip DSG C/D/I  Pulses: +2 pedal pulses wwp toes  Sensation: silt intact bilateral lower extremities  Motor: EHL/FHL/TA/GS= 5/5 bilateral lower extremities         Plan of Care:  A/P: 42yFemale POD#2 s/p Right Hip scope/CHEYANNE  - afebrile   - Pain Control- tylenol 975mg PO Q8h , Diazepam 5mg PO Q8h prn muscle spasms, gabapentin 200mg PO bedtime, Morphine 2mg IVP q4h prn breakthrough pain, Oxycodone 5-10mg PO Q4h prn moderate to severe pain   - DVT ppx: aspirin 81mg PO Daily  - PT, WBS: Flat foot touch RLE  - bowel regimen, IS use, PPI  - CPM 0-40 - 2 hours BID   - Dispo- home pending pt clearance     Ortho Pager [phone]

## 2022-10-24 NOTE — DISCHARGE NOTE PROVIDER - NSDCMRMEDTOKEN_GEN_ALL_CORE_FT
acetaminophen 325 mg oral tablet: 3 tab(s) orally every 8 hours  Advil 200 mg oral tablet: 1 tab(s) orally every 6 hours  busPIRone 10 mg oral tablet: 1 tab(s) orally 2 times a day  docusate potassium 100 mg oral capsule:   gabapentin 100 mg oral tablet: 200  orally once a day (at bedtime)  naratriptan 2.5 mg oral tablet: 1 tab(s) orally once a day  Plaquenil 200 mg oral tablet: 1 tab(s) orally once a day  prazosin 1 mg oral capsule: 1 cap(s) orally 3 times a day  propranolol 10 mg oral tablet: 1 tab(s) orally 2 times a day  Xanax 0.5 mg oral tablet: orally once a day   apixaban 5 mg oral tablet: 2 tab(s) orally every 12 hours for 1 week and the 1 tablet BID for 3 months  Starte Date 10-27- take  2 tablets twice a daydose for 1 week until 11-3 and then take apixaban 1 tablet twice a day for 3 months   busPIRone 10 mg oral tablet: 1 tab(s) orally 2 times a day  docusate potassium 100 mg oral capsule:   gabapentin 100 mg oral capsule: 2 cap(s) orally 3 times a day  hydroxychloroquine 200 mg oral tablet: 1 tab(s) orally once a day  lidocaine 4% topical film: Apply topically to affected area once a day   loratadine 10 mg oral tablet: 1 tab(s) orally once a day, As needed, itching  melatonin 5 mg oral tablet: 1 tab(s) orally once a day (at bedtime)  Multiple Vitamins oral tablet: 1 tab(s) orally once a day  naratriptan 2.5 mg oral tablet: 1 tab(s) orally once a day  ondansetron 4 mg oral tablet: 1 tab(s) orally every 8 hours, As Needed MDD:3   oxyCODONE 5 mg oral tablet: 1 tab(s) orally every 4 hours, As needed, severe Pain (7 - 10) MDD:6  prazosin 1 mg oral capsule: 1 cap(s) orally 3 times a day  propranolol 10 mg oral tablet: 1 tab(s) orally 2 times a day  senna leaf extract oral tablet: 2 tab(s) orally once a day (at bedtime)  simethicone 80 mg oral tablet, chewable: 1 tab(s) orally 2 times a day, As needed, Gas  Xanax 0.5 mg oral tablet: orally once a day

## 2022-10-24 NOTE — DISCHARGE NOTE PROVIDER - NSDCCPCAREPLAN_GEN_ALL_CORE_FT
PRINCIPAL DISCHARGE DIAGNOSIS  Diagnosis: Dysplasia of hip  Assessment and Plan of Treatment:        PRINCIPAL DISCHARGE DIAGNOSIS  Diagnosis: Dysplasia of hip  Assessment and Plan of Treatment: Right Hip scope with debridement CHEYANNE

## 2022-10-24 NOTE — DISCHARGE NOTE NURSING/CASE MANAGEMENT/SOCIAL WORK - NSDCPEFALRISK_GEN_ALL_CORE
For information on Fall & Injury Prevention, visit: [email or web address] OR  [email or web address] OR  https://www.cdc.gov/steadi/patient.html

## 2022-10-24 NOTE — DISCHARGE NOTE PROVIDER - NSDCCPTREATMENT_GEN_ALL_CORE_FT
PRINCIPAL PROCEDURE  Procedure: Periacetabular osteotomy  Findings and Treatment:       SECONDARY PROCEDURE  Procedure: Right hip arthroscopy  Findings and Treatment:

## 2022-10-24 NOTE — OCCUPATIONAL THERAPY INITIAL EVALUATION ADULT - MD ORDER
Right hip pain 2/2 Developmental Dysplasia  Now s/p Right hip arthroscopy and Periacetabular osteotomy on 10/21/22

## 2022-10-24 NOTE — OCCUPATIONAL THERAPY INITIAL EVALUATION ADULT - GENERAL OBSERVATIONS, REHAB EVAL
Pt's TEVIN Ernst aware of intent to eval/tx; cleared Pt. Pt received in semi supine; her mother present at bedside. - +heplock, surgical dressing to R Hip.

## 2022-10-24 NOTE — PROGRESS NOTE ADULT - SUBJECTIVE AND OBJECTIVE BOX
Procedure: R hip scope/CHEYANNE   Surgeon: Zeeshan/Ervin     Pt reporting some pain this am, improves with oxycodone. Orthostatic with PT yesterday. Has started using CPM machine for ROM hip. Denies CP, SOB, N/V, numbness/tingling     Vital Signs Last 24 Hrs  T(C): 37.2 (24 Oct 2022 05:25), Max: 37.2 (24 Oct 2022 05:25)  T(F): 98.9 (24 Oct 2022 05:25), Max: 98.9 (24 Oct 2022 05:25)  HR: 86 (24 Oct 2022 05:25) (78 - 105)  BP: 111/71 (24 Oct 2022 05:25) (88/55 - 112/68)  BP(mean): --  RR: 16 (24 Oct 2022 05:25) (16 - 17)  SpO2: 98% (24 Oct 2022 05:25) (97% - 99%)    Parameters below as of 24 Oct 2022 05:25  Patient On (Oxygen Delivery Method): room air      General: Pt Alert and oriented, NAD  DSG C/D/I  Pulses: 2+ dP   Sensation: silt sural/saph/dpn/spn/tib   Motor: 5/5 ehl/fhl/ta/gs    Labs:                      11.5   8.66  )-----------( 188      ( 23 Oct 2022 05:30 )             34.4     10-23    142  |  107  |  12  ----------------------------<  104<H>  4.0   |  28  |  1.03    Ca    8.3<L>      23 Oct 2022 05:30      A/P: 42yFemale s/p R hip scope/CHEYANNE by Dr. Mueller/Zeeshan on 10-22  - Stable  - Pain Control  - DVT ppx: ASA 81 daily  - Post op abx: ancef  - WBS: foot flat touch down   - Dispo: HPT when clears    Ortho Pager [phone]

## 2022-10-24 NOTE — OCCUPATIONAL THERAPY INITIAL EVALUATION ADULT - ADDITIONAL COMMENTS
Pt lives in apt w/ room mate. Pt states that she was independent prior to admission. No prior use of AEs/DMEs reported by Pt.

## 2022-10-24 NOTE — DISCHARGE NOTE PROVIDER - CARE PROVIDER_API CALL
Marisa Mueller  [facility]  [address] [place] [address]  Phone: [phone]  Fax: [phone]  Follow Up Time:

## 2022-10-25 LAB
ANION GAP SERPL CALC-SCNC: 7 MMOL/L — SIGNIFICANT CHANGE UP (ref 5–17)
BASOPHILS # BLD AUTO: 0.03 K/UL
BASOPHILS NFR BLD AUTO: 0.3 %
BUN SERPL-MCNC: 9 MG/DL — SIGNIFICANT CHANGE UP (ref 7–23)
CALCIUM SERPL-MCNC: 8.9 MG/DL — SIGNIFICANT CHANGE UP (ref 8.4–10.5)
CHLORIDE SERPL-SCNC: 104 MMOL/L — SIGNIFICANT CHANGE UP (ref 96–108)
CO2 SERPL-SCNC: 31 MMOL/L — SIGNIFICANT CHANGE UP (ref 22–31)
CREAT SERPL-MCNC: 0.98 MG/DL — SIGNIFICANT CHANGE UP (ref 0.5–1.3)
EGFR: 75 ML/MIN/1.73M2 — SIGNIFICANT CHANGE UP
EOSINOPHIL # BLD AUTO: 0.06 K/UL
EOSINOPHIL NFR BLD AUTO: 0.6 %
GLUCOSE SERPL-MCNC: 92 MG/DL — SIGNIFICANT CHANGE UP (ref 70–99)
HCT VFR BLD CALC: 38.8 % — SIGNIFICANT CHANGE UP (ref 34.5–45)
HCT VFR BLD CALC: 42.6 %
HGB BLD-MCNC: 12.8 G/DL — SIGNIFICANT CHANGE UP (ref 11.5–15.5)
HGB BLD-MCNC: 13.8 G/DL
IMM GRANULOCYTES NFR BLD AUTO: 0.2 %
LYMPHOCYTES # BLD AUTO: 2.73 K/UL
LYMPHOCYTES NFR BLD AUTO: 29.5 %
MAN DIFF?: NORMAL
MCHC RBC-ENTMCNC: 28.4 PG — SIGNIFICANT CHANGE UP (ref 27–34)
MCHC RBC-ENTMCNC: 28.6 PG
MCHC RBC-ENTMCNC: 32.4 GM/DL
MCHC RBC-ENTMCNC: 33 GM/DL — SIGNIFICANT CHANGE UP (ref 32–36)
MCV RBC AUTO: 86.2 FL — SIGNIFICANT CHANGE UP (ref 80–100)
MCV RBC AUTO: 88.2 FL
MONOCYTES # BLD AUTO: 0.78 K/UL
MONOCYTES NFR BLD AUTO: 8.4 %
NEUTROPHILS # BLD AUTO: 5.62 K/UL
NEUTROPHILS NFR BLD AUTO: 61 %
NRBC # BLD: 0 /100 WBCS — SIGNIFICANT CHANGE UP (ref 0–0)
PLATELET # BLD AUTO: 232 K/UL — SIGNIFICANT CHANGE UP (ref 150–400)
PLATELET # BLD AUTO: 291 K/UL
POTASSIUM SERPL-MCNC: 4.4 MMOL/L — SIGNIFICANT CHANGE UP (ref 3.5–5.3)
POTASSIUM SERPL-SCNC: 4.4 MMOL/L — SIGNIFICANT CHANGE UP (ref 3.5–5.3)
RBC # BLD: 4.5 M/UL — SIGNIFICANT CHANGE UP (ref 3.8–5.2)
RBC # BLD: 4.83 M/UL
RBC # FLD: 11.8 % — SIGNIFICANT CHANGE UP (ref 10.3–14.5)
RBC # FLD: 12.7 %
SODIUM SERPL-SCNC: 142 MMOL/L — SIGNIFICANT CHANGE UP (ref 135–145)
WBC # BLD: 6.74 K/UL — SIGNIFICANT CHANGE UP (ref 3.8–10.5)
WBC # FLD AUTO: 6.74 K/UL — SIGNIFICANT CHANGE UP (ref 3.8–10.5)
WBC # FLD AUTO: 9.24 K/UL

## 2022-10-25 RX ADMIN — OXYCODONE HYDROCHLORIDE 10 MILLIGRAM(S): 5 TABLET ORAL at 12:06

## 2022-10-25 RX ADMIN — Medication 975 MILLIGRAM(S): at 01:07

## 2022-10-25 RX ADMIN — Medication 975 MILLIGRAM(S): at 09:51

## 2022-10-25 RX ADMIN — MORPHINE SULFATE 2 MILLIGRAM(S): 50 CAPSULE, EXTENDED RELEASE ORAL at 07:29

## 2022-10-25 RX ADMIN — POLYETHYLENE GLYCOL 3350 17 GRAM(S): 17 POWDER, FOR SOLUTION ORAL at 22:03

## 2022-10-25 RX ADMIN — Medication 200 MILLIGRAM(S): at 11:26

## 2022-10-25 RX ADMIN — Medication 10 MILLIGRAM(S): at 18:07

## 2022-10-25 RX ADMIN — SENNA PLUS 2 TABLET(S): 8.6 TABLET ORAL at 22:03

## 2022-10-25 RX ADMIN — Medication 5 MILLIGRAM(S): at 22:33

## 2022-10-25 RX ADMIN — Medication 975 MILLIGRAM(S): at 18:08

## 2022-10-25 RX ADMIN — OXYCODONE HYDROCHLORIDE 10 MILLIGRAM(S): 5 TABLET ORAL at 19:42

## 2022-10-25 RX ADMIN — Medication 975 MILLIGRAM(S): at 19:08

## 2022-10-25 RX ADMIN — Medication 1 TABLET(S): at 11:26

## 2022-10-25 RX ADMIN — Medication 975 MILLIGRAM(S): at 10:51

## 2022-10-25 RX ADMIN — GABAPENTIN 200 MILLIGRAM(S): 400 CAPSULE ORAL at 21:58

## 2022-10-25 RX ADMIN — Medication 10 MILLIGRAM(S): at 05:42

## 2022-10-25 RX ADMIN — ONDANSETRON 4 MILLIGRAM(S): 8 TABLET, FILM COATED ORAL at 19:41

## 2022-10-25 RX ADMIN — Medication 975 MILLIGRAM(S): at 02:07

## 2022-10-25 RX ADMIN — MAGNESIUM HYDROXIDE 30 MILLILITER(S): 400 TABLET, CHEWABLE ORAL at 09:51

## 2022-10-25 RX ADMIN — ONDANSETRON 4 MILLIGRAM(S): 8 TABLET, FILM COATED ORAL at 07:27

## 2022-10-25 RX ADMIN — Medication 5 MILLIGRAM(S): at 22:02

## 2022-10-25 RX ADMIN — LORATADINE 10 MILLIGRAM(S): 10 TABLET ORAL at 00:00

## 2022-10-25 RX ADMIN — OXYCODONE HYDROCHLORIDE 10 MILLIGRAM(S): 5 TABLET ORAL at 13:06

## 2022-10-25 RX ADMIN — MORPHINE SULFATE 2 MILLIGRAM(S): 50 CAPSULE, EXTENDED RELEASE ORAL at 07:45

## 2022-10-25 RX ADMIN — Medication 81 MILLIGRAM(S): at 11:26

## 2022-10-25 NOTE — PROGRESS NOTE ADULT - SUBJECTIVE AND OBJECTIVE BOX
Procedure: R hip scope/CHEYANNE   Surgeon: Zeeshan/Ervin     Pt reporting pain improved this am, improves with oxycodone. Orthostatic with PT again yesterday, required 1L bolus. Has started using CPM machine for ROM hip. Denies CP, SOB, N/V, numbness/tingling     Vital Signs Last 24 Hrs  T(C): 36.6 (25 Oct 2022 05:40), Max: 36.9 (24 Oct 2022 20:25)  T(F): 97.8 (25 Oct 2022 05:40), Max: 98.5 (24 Oct 2022 20:25)  HR: 90 (25 Oct 2022 05:40) (90 - 109)  BP: 101/70 (25 Oct 2022 05:40) (96/64 - 130/79)  BP(mean): --  RR: 16 (25 Oct 2022 05:40) (15 - 17)  SpO2: 98% (25 Oct 2022 05:40) (95% - 99%)    Parameters below as of 25 Oct 2022 05:40  Patient On (Oxygen Delivery Method): room air      General: Pt Alert and oriented, NAD  DSG C/D/I  Pulses: 2+ dP   Sensation: silt sural/saph/dpn/spn/tib   Motor: 5/5 ehl/fhl/ta/gs    Labs:                                 12.8   6.74  )-----------( 232      ( 25 Oct 2022 06:40 )             38.8     10-24    137  |  100  |  9   ----------------------------<  99  4.5   |  30  |  1.02    Ca    9.1      24 Oct 2022 11:16        A/P: 42yFemale s/p R hip scope/CHEYANNE by Dr. Mueller/Zeeshan on 10-22  - Stable  - Pain Control  - DVT ppx: ASA 81 daily  - Post op abx: ancef  - WBS: foot flat touch down   - Dispo: HPT when clears    Ortho Pager [phone]

## 2022-10-25 NOTE — PROGRESS NOTE ADULT - SUBJECTIVE AND OBJECTIVE BOX
Ortho Note    Subjective:  Pt comfortable without complaints, pain managed with current pain medication regimen.   Denies CP, SOB, N/V, numbness/tingling   Reviewed plan of care with patient at bedside       Vital Signs Last 24 Hrs  T(C): 36.7 (10-25-22 @ 08:50), Max: 36.7 (10-25-22 @ 08:50)  T(F): 98.1 (10-25-22 @ 08:50), Max: 98.1 (10-25-22 @ 08:50)  HR: 97 (10-25-22 @ 08:50) (90 - 97)  BP: 112/69 (10-25-22 @ 08:50) (101/70 - 112/69)  BP(mean): --  RR: 17 (10-25-22 @ 08:50) (16 - 17)  SpO2: 95% (10-25-22 @ 08:50) (95% - 98%)  AVSS    Objective:    Physical Exam:  General: Pt Alert and oriented, NAD  Right Hip DSG C/D/I  Pulses: +2 pedal pulses wwp toes  Sensation: silt intact bilateral lower extremities  Motor: EHL/FHL/TA/GS- 5/5 bilateral lower extremities         Plan of Care:  A/P: 42yFemale POD#3 s/p Right Hip scope/CHEYANNE  - afebrile   - Pain Control- tylenol 975mg PO Q8h , Diazepam 5mg PO Q8h prn muscle spasms, gabapentin 200mg PO bedtime, Morphine 2mg IVP q4h prn breakthrough pain, Oxycodone 5-10mg PO Q4h prn moderate to severe pain   - DVT ppx: aspirin 81mg PO Daily  - PT, WBS: Flat foot touch RLE  - bowel regimen, IS use, PPI  - CPM 0-40 - 2 hours BID   - Dispo- home pending pt clearance     Ortho Pager [phone]

## 2022-10-26 LAB
ANION GAP SERPL CALC-SCNC: 7 MMOL/L — SIGNIFICANT CHANGE UP (ref 5–17)
BUN SERPL-MCNC: 12 MG/DL — SIGNIFICANT CHANGE UP (ref 7–23)
CALCIUM SERPL-MCNC: 9.1 MG/DL — SIGNIFICANT CHANGE UP (ref 8.4–10.5)
CHLORIDE SERPL-SCNC: 101 MMOL/L — SIGNIFICANT CHANGE UP (ref 96–108)
CO2 SERPL-SCNC: 30 MMOL/L — SIGNIFICANT CHANGE UP (ref 22–31)
CREAT SERPL-MCNC: 1.01 MG/DL — SIGNIFICANT CHANGE UP (ref 0.5–1.3)
EGFR: 72 ML/MIN/1.73M2 — SIGNIFICANT CHANGE UP
GLUCOSE SERPL-MCNC: 94 MG/DL — SIGNIFICANT CHANGE UP (ref 70–99)
HCT VFR BLD CALC: 38.7 % — SIGNIFICANT CHANGE UP (ref 34.5–45)
HGB BLD-MCNC: 12.5 G/DL — SIGNIFICANT CHANGE UP (ref 11.5–15.5)
MCHC RBC-ENTMCNC: 28.4 PG — SIGNIFICANT CHANGE UP (ref 27–34)
MCHC RBC-ENTMCNC: 32.3 GM/DL — SIGNIFICANT CHANGE UP (ref 32–36)
MCV RBC AUTO: 88 FL — SIGNIFICANT CHANGE UP (ref 80–100)
NRBC # BLD: 0 /100 WBCS — SIGNIFICANT CHANGE UP (ref 0–0)
PLATELET # BLD AUTO: 247 K/UL — SIGNIFICANT CHANGE UP (ref 150–400)
POTASSIUM SERPL-MCNC: 4.6 MMOL/L — SIGNIFICANT CHANGE UP (ref 3.5–5.3)
POTASSIUM SERPL-SCNC: 4.6 MMOL/L — SIGNIFICANT CHANGE UP (ref 3.5–5.3)
RBC # BLD: 4.4 M/UL — SIGNIFICANT CHANGE UP (ref 3.8–5.2)
RBC # FLD: 11.8 % — SIGNIFICANT CHANGE UP (ref 10.3–14.5)
SODIUM SERPL-SCNC: 138 MMOL/L — SIGNIFICANT CHANGE UP (ref 135–145)
WBC # BLD: 7.69 K/UL — SIGNIFICANT CHANGE UP (ref 3.8–10.5)
WBC # FLD AUTO: 7.69 K/UL — SIGNIFICANT CHANGE UP (ref 3.8–10.5)

## 2022-10-26 PROCEDURE — 99223 1ST HOSP IP/OBS HIGH 75: CPT

## 2022-10-26 PROCEDURE — 93970 EXTREMITY STUDY: CPT | Mod: 26

## 2022-10-26 RX ORDER — OXYCODONE HYDROCHLORIDE 5 MG/1
5 TABLET ORAL ONCE
Refills: 0 | Status: DISCONTINUED | OUTPATIENT
Start: 2022-10-26 | End: 2022-10-26

## 2022-10-26 RX ORDER — MORPHINE SULFATE 50 MG/1
2 CAPSULE, EXTENDED RELEASE ORAL EVERY 4 HOURS
Refills: 0 | Status: DISCONTINUED | OUTPATIENT
Start: 2022-10-26 | End: 2022-10-28

## 2022-10-26 RX ORDER — APIXABAN 2.5 MG/1
10 TABLET, FILM COATED ORAL EVERY 12 HOURS
Refills: 0 | Status: DISCONTINUED | OUTPATIENT
Start: 2022-10-26 | End: 2022-10-28

## 2022-10-26 RX ADMIN — Medication 975 MILLIGRAM(S): at 03:04

## 2022-10-26 RX ADMIN — OXYCODONE HYDROCHLORIDE 5 MILLIGRAM(S): 5 TABLET ORAL at 10:23

## 2022-10-26 RX ADMIN — OXYCODONE HYDROCHLORIDE 5 MILLIGRAM(S): 5 TABLET ORAL at 12:35

## 2022-10-26 RX ADMIN — SENNA PLUS 2 TABLET(S): 8.6 TABLET ORAL at 21:50

## 2022-10-26 RX ADMIN — Medication 5 MILLIGRAM(S): at 21:50

## 2022-10-26 RX ADMIN — Medication 5 MILLIGRAM(S): at 21:51

## 2022-10-26 RX ADMIN — Medication 81 MILLIGRAM(S): at 11:40

## 2022-10-26 RX ADMIN — OXYCODONE HYDROCHLORIDE 10 MILLIGRAM(S): 5 TABLET ORAL at 23:00

## 2022-10-26 RX ADMIN — Medication 975 MILLIGRAM(S): at 10:14

## 2022-10-26 RX ADMIN — ONDANSETRON 4 MILLIGRAM(S): 8 TABLET, FILM COATED ORAL at 19:14

## 2022-10-26 RX ADMIN — Medication 975 MILLIGRAM(S): at 18:57

## 2022-10-26 RX ADMIN — Medication 975 MILLIGRAM(S): at 11:27

## 2022-10-26 RX ADMIN — GABAPENTIN 200 MILLIGRAM(S): 400 CAPSULE ORAL at 21:50

## 2022-10-26 RX ADMIN — POLYETHYLENE GLYCOL 3350 17 GRAM(S): 17 POWDER, FOR SOLUTION ORAL at 21:50

## 2022-10-26 RX ADMIN — OXYCODONE HYDROCHLORIDE 5 MILLIGRAM(S): 5 TABLET ORAL at 11:34

## 2022-10-26 RX ADMIN — APIXABAN 10 MILLIGRAM(S): 2.5 TABLET, FILM COATED ORAL at 15:32

## 2022-10-26 RX ADMIN — OXYCODONE HYDROCHLORIDE 10 MILLIGRAM(S): 5 TABLET ORAL at 18:01

## 2022-10-26 RX ADMIN — OXYCODONE HYDROCHLORIDE 5 MILLIGRAM(S): 5 TABLET ORAL at 11:23

## 2022-10-26 RX ADMIN — LORATADINE 10 MILLIGRAM(S): 10 TABLET ORAL at 22:20

## 2022-10-26 RX ADMIN — OXYCODONE HYDROCHLORIDE 10 MILLIGRAM(S): 5 TABLET ORAL at 17:01

## 2022-10-26 RX ADMIN — Medication 10 MILLIGRAM(S): at 05:56

## 2022-10-26 RX ADMIN — Medication 1 TABLET(S): at 11:40

## 2022-10-26 RX ADMIN — Medication 975 MILLIGRAM(S): at 04:04

## 2022-10-26 RX ADMIN — Medication 200 MILLIGRAM(S): at 11:40

## 2022-10-26 RX ADMIN — Medication 975 MILLIGRAM(S): at 18:04

## 2022-10-26 RX ADMIN — Medication 10 MILLIGRAM(S): at 18:04

## 2022-10-26 NOTE — CONSULT NOTE ADULT - PROVIDER SPECIALTY LIST ADULT
Patient comfortable, satting well on room air. States she gets significantly dyspneic on exertion. States she was active up until roughly 2 months ago and can now no longer ambulate a few feet without severe dyspnea. Chart review shows ED visits from January of 2019 with complaints of dyspnea. Patient not wheezing, and without a cough. Has loculated pleural effusion which has been difficult to tap in the past and size of effusion is not particularly large, however patient with significant mediastinal and lung disease.     - Unlikely 2/2 effusion as it is chronic and stable. Patient with poor baseline pulmonary reserve, these symptoms are likely 2/2 progression of disease  - Will follow up patient's oxygenation and symptoms following EGD, will perform 6-minute walk test  - Patient satting 73% on room air at rest, will arrange home oxygen upon discharge   Internal Medicine

## 2022-10-26 NOTE — CONSULT NOTE ADULT - SUBJECTIVE AND OBJECTIVE BOX
Patient is a 42y old  Female who presents with a chief complaint of right hip pain (26 Oct 2022 12:09)        HPI :  42 year old female admitted to the hospital with Right hip pain , she underwent Right hip arthroscopy and periarticular Osteotomy on 10/21. Medicine was consulted today because of right leg DVT.    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      Insomnia      Anxiety      Panic disorder      Sjogren&#x27;s syndrome      S/P laparoscopic procedure  for endometriosis affecting bladder/colon/with ovary adhesion to uterus      History of arthroscopy of shoulder  Right Labrum Tear Repair-2012      History of nephrectomy, unilateral  right kidney 2010      Congenital renal anomaly  Right kidney corrective procedure (?), ureteral stent placment 2008.      [place] teeth extracted      Status post colon resection      History of appendectomy      H/O hand surgery  (L) Hand (Duputren Contracture (2020)          Social History:  + social EtOH use  Denies tobacco use (20 Oct 2022 11:13)      FAMILY HISTORY:  Family history of hypertension (Father)        Home Medications:  acetaminophen 325 mg oral tablet: 3 tab(s) orally every 8 hours (21 Oct 2022 09:52)  Advil 200 mg oral tablet: 1 tab(s) orally every 6 hours (21 Oct 2022 09:52)  busPIRone 10 mg oral tablet: 1 tab(s) orally 2 times a day (21 Oct 2022 09:52)  docusate potassium 100 mg oral capsule:  (21 Oct 2022 09:52)  gabapentin 100 mg oral tablet: 200  orally once a day (at bedtime) (21 Oct 2022 09:52)  naratriptan 2.5 mg oral tablet: 1 tab(s) orally once a day (21 Oct 2022 09:52)  Plaquenil 200 mg oral tablet: 1 tab(s) orally once a day (21 Oct 2022 09:52)  prazosin 1 mg oral capsule: 1 cap(s) orally 3 times a day (21 Oct 2022 09:52)  propranolol 10 mg oral tablet: 1 tab(s) orally 2 times a day (21 Oct 2022 09:52)  Xanax 0.5 mg oral tablet: orally once a day (21 Oct 2022 09:52)      MEDICATIONS  (STANDING):  acetaminophen     Tablet .. 975 milliGRAM(s) Oral every 8 hours  apixaban 10 milliGRAM(s) Oral every 12 hours  busPIRone 10 milliGRAM(s) Oral two times a day  gabapentin 200 milliGRAM(s) Oral at bedtime  hydroxychloroquine 200 milliGRAM(s) Oral daily  lactated ringers. 1000 milliLiter(s) (90 mL/Hr) IV Continuous <Continuous>  melatonin 5 milliGRAM(s) Oral at bedtime  multivitamin 1 Tablet(s) Oral daily  polyethylene glycol 3350 17 Gram(s) Oral at bedtime  propranolol 10 milliGRAM(s) Oral two times a day  senna 2 Tablet(s) Oral at bedtime    MEDICATIONS  (PRN):  bisacodyl Suppository 10 milliGRAM(s) Rectal daily PRN Constipation  diazepam    Tablet 5 milliGRAM(s) Oral every 8 hours PRN muscle spasm  diphenhydrAMINE Injectable 25 milliGRAM(s) IV Push every 6 hours PRN Rash and/or Itching  loratadine 10 milliGRAM(s) Oral daily PRN itching  magnesium hydroxide Suspension 30 milliLiter(s) Oral daily PRN Constipation  morphine  - Injectable 2 milliGRAM(s) IV Push every 4 hours PRN breakthrough pain  ondansetron Injectable 4 milliGRAM(s) IV Push every 6 hours PRN Nausea and/or Vomiting  oxyCODONE    IR 5 milliGRAM(s) Oral every 4 hours PRN Moderate Pain (4 - 6)  oxyCODONE    IR 10 milliGRAM(s) Oral every 4 hours PRN Severe Pain (7 - 10)  simethicone 80 milliGRAM(s) Chew two times a day PRN Gas      Vital Signs Last 24 Hrs  T(C): 36.4 (26 Oct 2022 20:40), Max: 36.7 (26 Oct 2022 05:05)  T(F): 97.5 (26 Oct 2022 20:40), Max: 98 (26 Oct 2022 05:05)  HR: 87 (26 Oct 2022 20:40) (79 - 97)  BP: 103/66 (26 Oct 2022 20:40) (97/64 - 110/65)  BP(mean): --  RR: 17 (26 Oct 2022 20:40) (16 - 18)  SpO2: 100% (26 Oct 2022 20:40) (94% - 100%)      10-26-22 @ 07:01  -  10-26-22 @ 22:02  --------------------------------------------------------  IN: 320 mL / OUT: 400 mL / NET: -80 mL        LABS:                        12.5   7.69  )-----------( 247      ( 26 Oct 2022 07:10 )             38.7     10-26    138  |  101  |  12  ----------------------------<  94  4.6   |  30  |  1.01    Ca    9.1      26 Oct 2022 07:10          CAPILLARY BLOOD GLUCOSE                    RADIOLOGY & ADDITIONAL TESTS:    Imaging personally reviewed and radiologists report reviewed     Consultant(s) Notes Reviewed    PHYSICAL EXAM:  GENERAL: not in distress   HEAD, EYES: EOMI, PERRLA, conjunctiva and sclera clear  ENMT:  Moist mucous membranes, Good dentition, No lesions  NECK: Supple, No JVD, Normal thyroid  NERVOUS SYSTEM:  Alert & Oriented X3, Good concentration; Motor Strength 5/5 B/L upper and lower extremities; DTRs 2+ intact and symmetric  CHEST/LUNG: Clear to auscultation  bilaterally; No rales, rhonchi, wheezing,   HEART: Regular rate and rhythm; No murmurs, rubs, or gallops  ABDOMEN: Soft, Nontender, Nondistended; Bowel sounds present  EXTREMITIES:  2+ Peripheral Pulses, No clubbing, cyanosis, or edema  LYMPH: No lymphadenopathy noted  SKIN: No rashes or lesions    Care Discussed with Primary team

## 2022-10-26 NOTE — PROGRESS NOTE ADULT - SUBJECTIVE AND OBJECTIVE BOX
Procedure: R hip scope/CHEYANNE   Surgeon: Zeeshan/Ervin     Pt reports she did better with PT yesterday and didn't have any issues with orthostasis. Pain improving, though endorses R calf pain this morning. Denies CP, SOB, N/V, numbness/tingling     Vital Signs Last 24 Hrs  T(C): 36.5 (25 Oct 2022 20:41), Max: 36.7 (25 Oct 2022 08:50)  T(F): 97.7 (25 Oct 2022 20:41), Max: 98.1 (25 Oct 2022 08:50)  HR: 74 (25 Oct 2022 20:41) (74 - 118)  BP: 95/64 (25 Oct 2022 20:41) (95/64 - 113/77)  BP(mean): --  RR: 16 (25 Oct 2022 20:41) (16 - 17)  SpO2: 97% (25 Oct 2022 20:41) (95% - 98%)    Parameters below as of 25 Oct 2022 20:41  Patient On (Oxygen Delivery Method): room air      General: Pt Alert and oriented, NAD  R hip DSG C/D/I  Pulses: 2+ dP   Sensation: silt sural/saph/dpn/spn/tib   Motor: 5/5 ehl/fhl/ta/gs  +R Crys, R calf TTP    Labs:                                                    12.8   6.74  )-----------( 232      ( 25 Oct 2022 06:40 )             38.8     10-25    142  |  104  |  9   ----------------------------<  92  4.4   |  31  |  0.98    Ca    8.9      25 Oct 2022 06:40        A/P: 42yFemale s/p R hip scope/CHEYANNE by Dr. Mueller/Zeeshan on 10-22  - Stable  - Pain Control  - DVT ppx: ASA 81 daily  - Post op abx: ancef  - WBS: foot flat touch down   - Dopplers today (ord) r/o DVT  - Dispo: HPT when clears    Ortho Pager [phone]

## 2022-10-26 NOTE — CONSULT NOTE ADULT - ASSESSMENT
42 year old female admitted for right hip pain     Impression and Plan   # Right Hip Pain s/p Right Hip Arthroscopy and CHEYANNE on 10/21  - pain control ,Weightbearing status , Dressing changes  per ortho team    # Acute Distal Right Leg DVT   -start Po Apixaban 10mg po bid for 7 days followed by 5mg po bid for total of 3 months   -provoked by recent surgery     # Hx of Sjogren's on Plaquenil    # Hx Endometriosis s/p Partial Colectomy     # Unilateral Kidney due right Nephrectomy ( Congenital Anomaly)

## 2022-10-26 NOTE — PROGRESS NOTE ADULT - SUBJECTIVE AND OBJECTIVE BOX
Ortho Note    Pt reports moderate pain today. Reports taking Oxy 5 which was not substantial enough for pain. Pain is located in the right groin. She is reports pain in the right calf.   Denies CP, SOB, N/V, new numbness/tingling.  Voiding. Tolerating PO intake.     Vital Signs Last 24 Hrs  T(C): 36.3 (10-26-22 @ 08:36), Max: 36.3 (10-26-22 @ 08:36)  T(F): 97.4 (10-26-22 @ 08:36), Max: 97.4 (10-26-22 @ 08:36)  HR: 91 (10-26-22 @ 10:25) (79 - 91)  BP: 103/70 (10-26-22 @ 10:25) (97/64 - 103/70)  BP(mean): --  RR: 17 (10-26-22 @ 10:25) (17 - 18)  SpO2: 98% (10-26-22 @ 10:25) (94% - 98%)  I&O's Summary    26 Oct 2022 07:01  -  26 Oct 2022 12:10  --------------------------------------------------------  IN: 320 mL / OUT: 400 mL / NET: -80 mL        General: Pt Alert and oriented, NAD  DSG C/D/I- Gauze/teg  Pulses: DP2+ bilat LE; well perfused distally; appropriately warm to touch; right calf pain  Sensation: General sensation to light touch intact bilat LE  Motor: EHL/FHL/TA/GS 5/5 bilat LE                          12.5   7.69  )-----------( 247      ( 26 Oct 2022 07:10 )             38.7     10-26    138  |  101  |  12  ----------------------------<  94  4.6   |  30  |  1.01    Ca    9.1      26 Oct 2022 07:10        A/P: 42yFemale s/p right hip arthroscopy and CHEYANNE, 10/21/22, Dr. Mueller  - Stable  - Pain Control  - DVT ppx: SCDs, ASA 81mg daily  - Postop abx: Ancef  - PT, WBS: Foot flat touchdown WB RLE  - DVT u/s to r/o DVT, pending completion, should go around 1p  - Dispo: home pending home PT    Ortho Pager [phone] Ortho Note    Pt reports moderate pain today. Reports taking Oxy 5 which was not substantial enough for pain. Pain is located in the right groin. She is reports pain in the right calf.   Denies CP, SOB, N/V, new numbness/tingling.  Voiding. Tolerating PO intake.     Vital Signs Last 24 Hrs  T(C): 36.3 (10-26-22 @ 08:36), Max: 36.3 (10-26-22 @ 08:36)  T(F): 97.4 (10-26-22 @ 08:36), Max: 97.4 (10-26-22 @ 08:36)  HR: 91 (10-26-22 @ 10:25) (79 - 91)  BP: 103/70 (10-26-22 @ 10:25) (97/64 - 103/70)  BP(mean): --  RR: 17 (10-26-22 @ 10:25) (17 - 18)  SpO2: 98% (10-26-22 @ 10:25) (94% - 98%)  I&O's Summary    26 Oct 2022 07:01  -  26 Oct 2022 12:10  --------------------------------------------------------  IN: 320 mL / OUT: 400 mL / NET: -80 mL        General: Pt Alert and oriented, NAD  DSG C/D/I- Gauze/teg  Pulses: DP2+ bilat LE; well perfused distally; appropriately warm to touch; right calf pain  Sensation: General sensation to light touch intact bilat LE  Motor: EHL/FHL/TA/GS 5/5 bilat LE                          12.5   7.69  )-----------( 247      ( 26 Oct 2022 07:10 )             38.7     10-26    138  |  101  |  12  ----------------------------<  94  4.6   |  30  |  1.01    Ca    9.1      26 Oct 2022 07:10        A/P: 42yFemale s/p right hip arthroscopy and CHEYANNE, 10/21/22, Dr. Mueller  - Stable  - Pain Control  - DVT ppx: SCDs, ASA 81mg daily  - Postop abx: Ancef  - PT, WBS: Foot flat touchdown WB RLE  - DVT u/s to r/o DVT- positive for DVT. Eliquis started. 10mg BID x 14 doses, then 5mg BID x 3 mos  - Medicine consulted. Appreciate recs  - Dispo: home pending home PT    Ortho Pager [phone]

## 2022-10-27 LAB
ANION GAP SERPL CALC-SCNC: 8 MMOL/L — SIGNIFICANT CHANGE UP (ref 5–17)
BUN SERPL-MCNC: 10 MG/DL — SIGNIFICANT CHANGE UP (ref 7–23)
CALCIUM SERPL-MCNC: 8.3 MG/DL — LOW (ref 8.4–10.5)
CHLORIDE SERPL-SCNC: 106 MMOL/L — SIGNIFICANT CHANGE UP (ref 96–108)
CO2 SERPL-SCNC: 26 MMOL/L — SIGNIFICANT CHANGE UP (ref 22–31)
CREAT SERPL-MCNC: 0.92 MG/DL — SIGNIFICANT CHANGE UP (ref 0.5–1.3)
EGFR: 81 ML/MIN/1.73M2 — SIGNIFICANT CHANGE UP
GLUCOSE SERPL-MCNC: 111 MG/DL — HIGH (ref 70–99)
HCT VFR BLD CALC: 34.4 % — LOW (ref 34.5–45)
HGB BLD-MCNC: 11.2 G/DL — LOW (ref 11.5–15.5)
MCHC RBC-ENTMCNC: 28.3 PG — SIGNIFICANT CHANGE UP (ref 27–34)
MCHC RBC-ENTMCNC: 32.6 GM/DL — SIGNIFICANT CHANGE UP (ref 32–36)
MCV RBC AUTO: 86.9 FL — SIGNIFICANT CHANGE UP (ref 80–100)
NRBC # BLD: 0 /100 WBCS — SIGNIFICANT CHANGE UP (ref 0–0)
PLATELET # BLD AUTO: 234 K/UL — SIGNIFICANT CHANGE UP (ref 150–400)
POTASSIUM SERPL-MCNC: 4.3 MMOL/L — SIGNIFICANT CHANGE UP (ref 3.5–5.3)
POTASSIUM SERPL-SCNC: 4.3 MMOL/L — SIGNIFICANT CHANGE UP (ref 3.5–5.3)
RBC # BLD: 3.96 M/UL — SIGNIFICANT CHANGE UP (ref 3.8–5.2)
RBC # FLD: 11.6 % — SIGNIFICANT CHANGE UP (ref 10.3–14.5)
SODIUM SERPL-SCNC: 140 MMOL/L — SIGNIFICANT CHANGE UP (ref 135–145)
WBC # BLD: 7.39 K/UL — SIGNIFICANT CHANGE UP (ref 3.8–10.5)
WBC # FLD AUTO: 7.39 K/UL — SIGNIFICANT CHANGE UP (ref 3.8–10.5)

## 2022-10-27 PROCEDURE — 99232 SBSQ HOSP IP/OBS MODERATE 35: CPT

## 2022-10-27 RX ORDER — GABAPENTIN 400 MG/1
200 CAPSULE ORAL THREE TIMES A DAY
Refills: 0 | Status: DISCONTINUED | OUTPATIENT
Start: 2022-10-27 | End: 2022-10-28

## 2022-10-27 RX ORDER — MORPHINE SULFATE 50 MG/1
15 CAPSULE, EXTENDED RELEASE ORAL EVERY 4 HOURS
Refills: 0 | Status: DISCONTINUED | OUTPATIENT
Start: 2022-10-27 | End: 2022-10-28

## 2022-10-27 RX ORDER — MORPHINE SULFATE 50 MG/1
7.5 CAPSULE, EXTENDED RELEASE ORAL EVERY 4 HOURS
Refills: 0 | Status: DISCONTINUED | OUTPATIENT
Start: 2022-10-27 | End: 2022-10-28

## 2022-10-27 RX ORDER — LIDOCAINE 4 G/100G
1 CREAM TOPICAL DAILY
Refills: 0 | Status: DISCONTINUED | OUTPATIENT
Start: 2022-10-27 | End: 2022-10-28

## 2022-10-27 RX ADMIN — Medication 975 MILLIGRAM(S): at 02:55

## 2022-10-27 RX ADMIN — Medication 5 MILLIGRAM(S): at 21:48

## 2022-10-27 RX ADMIN — OXYCODONE HYDROCHLORIDE 10 MILLIGRAM(S): 5 TABLET ORAL at 06:22

## 2022-10-27 RX ADMIN — Medication 975 MILLIGRAM(S): at 17:23

## 2022-10-27 RX ADMIN — Medication 1 TABLET(S): at 11:47

## 2022-10-27 RX ADMIN — LIDOCAINE 1 PATCH: 4 CREAM TOPICAL at 18:38

## 2022-10-27 RX ADMIN — APIXABAN 10 MILLIGRAM(S): 2.5 TABLET, FILM COATED ORAL at 05:25

## 2022-10-27 RX ADMIN — LIDOCAINE 1 PATCH: 4 CREAM TOPICAL at 16:03

## 2022-10-27 RX ADMIN — Medication 975 MILLIGRAM(S): at 09:55

## 2022-10-27 RX ADMIN — MORPHINE SULFATE 7.5 MILLIGRAM(S): 50 CAPSULE, EXTENDED RELEASE ORAL at 22:49

## 2022-10-27 RX ADMIN — APIXABAN 10 MILLIGRAM(S): 2.5 TABLET, FILM COATED ORAL at 17:23

## 2022-10-27 RX ADMIN — Medication 975 MILLIGRAM(S): at 01:55

## 2022-10-27 RX ADMIN — ONDANSETRON 4 MILLIGRAM(S): 8 TABLET, FILM COATED ORAL at 11:47

## 2022-10-27 RX ADMIN — GABAPENTIN 200 MILLIGRAM(S): 400 CAPSULE ORAL at 16:04

## 2022-10-27 RX ADMIN — OXYCODONE HYDROCHLORIDE 10 MILLIGRAM(S): 5 TABLET ORAL at 09:24

## 2022-10-27 RX ADMIN — GABAPENTIN 200 MILLIGRAM(S): 400 CAPSULE ORAL at 21:48

## 2022-10-27 RX ADMIN — Medication 10 MILLIGRAM(S): at 05:25

## 2022-10-27 RX ADMIN — Medication 200 MILLIGRAM(S): at 11:47

## 2022-10-27 RX ADMIN — MORPHINE SULFATE 7.5 MILLIGRAM(S): 50 CAPSULE, EXTENDED RELEASE ORAL at 21:49

## 2022-10-27 RX ADMIN — Medication 10 MILLIGRAM(S): at 17:23

## 2022-10-27 RX ADMIN — SIMETHICONE 80 MILLIGRAM(S): 80 TABLET, CHEWABLE ORAL at 16:04

## 2022-10-27 RX ADMIN — Medication 975 MILLIGRAM(S): at 18:23

## 2022-10-27 RX ADMIN — OXYCODONE HYDROCHLORIDE 10 MILLIGRAM(S): 5 TABLET ORAL at 05:22

## 2022-10-27 RX ADMIN — OXYCODONE HYDROCHLORIDE 10 MILLIGRAM(S): 5 TABLET ORAL at 00:00

## 2022-10-27 RX ADMIN — Medication 975 MILLIGRAM(S): at 08:55

## 2022-10-27 RX ADMIN — OXYCODONE HYDROCHLORIDE 10 MILLIGRAM(S): 5 TABLET ORAL at 10:24

## 2022-10-27 NOTE — CONSULT NOTE ADULT - NS ATTEND AMEND GEN_ALL_CORE FT
42y old Female with a history of endometriosis, migraines, anxiety, congenital kidney disease, POD 6 s/p periacetabular osteotomy, right hip arthroscopy with Dr. Mueller. Pt evaluated for pain control. Chart reviewed, medications/allergies reviewed. Pain management plan/options discussed. We will continue the comprehensive medical management plan and titrate to pain control and side effects. We will follow up. Dr. Alfonso

## 2022-10-27 NOTE — PROGRESS NOTE ADULT - SUBJECTIVE AND OBJECTIVE BOX
Patient is a 42y old  Female who presents with a chief complaint of right hip pain (27 Oct 2022 11:33)        SUBJECTIVE:  Patient was seen and examined at bedside.    Overnight Events :  right leg pain , no shortness of breath no chest pain       Review of systems: 12 point Review of systems negative unless otherwise documented elsewhere in note.     Diet, Regular:   Gluten-Gliadin Restricted (10-26-22 @ 10:23) [Active]      MEDICATIONS:  MEDICATIONS  (STANDING):  acetaminophen     Tablet .. 975 milliGRAM(s) Oral every 8 hours  apixaban 10 milliGRAM(s) Oral every 12 hours  busPIRone 10 milliGRAM(s) Oral two times a day  gabapentin 200 milliGRAM(s) Oral three times a day  hydroxychloroquine 200 milliGRAM(s) Oral daily  lactated ringers. 1000 milliLiter(s) (90 mL/Hr) IV Continuous <Continuous>  lidocaine   4% Patch 1 Patch Transdermal daily  melatonin 5 milliGRAM(s) Oral at bedtime  multivitamin 1 Tablet(s) Oral daily  polyethylene glycol 3350 17 Gram(s) Oral at bedtime  propranolol 10 milliGRAM(s) Oral two times a day  senna 2 Tablet(s) Oral at bedtime    MEDICATIONS  (PRN):  bisacodyl Suppository 10 milliGRAM(s) Rectal daily PRN Constipation  diazepam    Tablet 5 milliGRAM(s) Oral every 8 hours PRN muscle spasm  diphenhydrAMINE Injectable 25 milliGRAM(s) IV Push every 6 hours PRN Rash and/or Itching  loratadine 10 milliGRAM(s) Oral daily PRN itching  magnesium hydroxide Suspension 30 milliLiter(s) Oral daily PRN Constipation  morphine  - Injectable 2 milliGRAM(s) IV Push every 4 hours PRN breakthrough pain  morphine  IR 7.5 milliGRAM(s) Oral every 4 hours PRN Moderate Pain (4 - 6)  morphine  IR 15 milliGRAM(s) Oral every 4 hours PRN Severe Pain (7 - 10)  ondansetron Injectable 4 milliGRAM(s) IV Push every 6 hours PRN Nausea and/or Vomiting  simethicone 80 milliGRAM(s) Chew two times a day PRN Gas      Allergies    adhesives (Hives; Rash)  Bactrim (Unknown)  chlorhexidine topical (Rash)  Dilaudid (Unknown)  Lamictal (Other)  penicillin (Hives)  Steri strips:    big blood blisters (Other)    Intolerances        OBJECTIVE:  Vital Signs Last 24 Hrs  T(C): 36.5 (27 Oct 2022 08:30), Max: 36.6 (27 Oct 2022 05:15)  T(F): 97.7 (27 Oct 2022 08:30), Max: 97.9 (27 Oct 2022 05:15)  HR: 95 (27 Oct 2022 08:30) (87 - 97)  BP: 99/70 (27 Oct 2022 08:30) (97/67 - 103/66)  BP(mean): --  RR: 18 (27 Oct 2022 08:30) (17 - 18)  SpO2: 99% (27 Oct 2022 08:30) (95% - 100%)    Parameters below as of 27 Oct 2022 08:30  Patient On (Oxygen Delivery Method): room air      I&O's Summary    26 Oct 2022 07:01  -  27 Oct 2022 07:00  --------------------------------------------------------  IN: 320 mL / OUT: 400 mL / NET: -80 mL    27 Oct 2022 07:01  -  27 Oct 2022 15:00  --------------------------------------------------------  IN: 600 mL / OUT: 900 mL / NET: -300 mL        PHYSICAL EXAM:  Gen: Resting in bed at time of exam, not in distress   HEENT: moist mucosa, no lesions   Neck: supple, trachea at midline  CV: RRR, +S1/S2  Pulm: no wheezing , no crackles  no increase in work of breathing  Abd: soft, NTND  Skin: warm and dry, no new rashes   Ext: moving all 4 extremities spontaneously , Right leg swollen   Neuro: AOx3, no gross focal neurological deficits  Psych: affect and behavior appropriate, pleasant at time of interview    LABS:                        11.2   7.39  )-----------( 234      ( 27 Oct 2022 07:02 )             34.4     10-27    140  |  106  |  10  ----------------------------<  111<H>  4.3   |  26  |  0.92    Ca    8.3<L>      27 Oct 2022 07:02          CAPILLARY BLOOD GLUCOSE            MICRODATA:      RADIOLOGY/OTHER STUDIES:

## 2022-10-27 NOTE — CONSULT NOTE ADULT - SUBJECTIVE AND OBJECTIVE BOX
Pain Management Consult Note - [facility] [phone]    Chief Complaint: right hip pain     HPI: Patient seen and examined today. This is a 41 y/o female PMH of endometriosis, migraines, anxiety, congenital kidney disease, POD 6 s/p periacetabular osteotomy, right hip arthroscopy with Dr. Mueller. Patient reports endorsing right hip pain that radiates to the right groin, associated with tingling. Patient reports groin pain increases with movement and walking, and states it feels like a "stinging". Patient also found to have a DVT to the RLE.      Pain is ___ sharp ____dull ___burning ___achy ___ Intensity: ____ mild ___mod ___severe     Location ____surgical site ____cervical _____lumbar ____abd ____upper ext____lower ext    Worse with ____activity ____movement _____physical therapy___ Rest    Improved with ____medication ____rest ____physical therapy    ROS: Const:  ___febrile   Eyes:___ENT:___CV: ___chest pain  Resp: ____sob  GI:___nausea ___vomiting ___abd pain ___npo ___clears __full diet __bm  :___ Musk: ___pain ___spasm  Skin:___ Neuro:  ___sedation___confusion___ numbness ___weakness ___paresth  Psych:__anxiety  Endo:___ Heme:___Allergy:_________, ___all others reviewed and negative    PAST MEDICAL & SURGICAL HISTORY:  Endometriosis      Migraines      Amenorrhea      Anxiety associated with depression      UPJ stricture, acquired      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      Insomnia      Anxiety      Panic disorder      Sjogren&#x27;s syndrome      S/P laparoscopic procedure  for endometriosis affecting bladder/colon/with ovary adhesion to uterus      History of arthroscopy of shoulder  Right Labrum Tear Repair-2012      History of nephrectomy, unilateral  right kidney 2010      Congenital renal anomaly  Right kidney corrective procedure (?), ureteral stent placment 2008.      [place] teeth extracted      Status post colon resection      History of appendectomy      H/O hand surgery  (L) Hand (Duputren Contracture (2020)          SH: ___Tobacco   ___Alcohol                          FH:FAMILY HISTORY:  Family history of hypertension (Father)        acetaminophen     Tablet .. 975 milliGRAM(s) Oral every 8 hours  apixaban 10 milliGRAM(s) Oral every 12 hours  bisacodyl Suppository 10 milliGRAM(s) Rectal daily PRN  busPIRone 10 milliGRAM(s) Oral two times a day  diazepam    Tablet 5 milliGRAM(s) Oral every 8 hours PRN  diphenhydrAMINE Injectable 25 milliGRAM(s) IV Push every 6 hours PRN  gabapentin 200 milliGRAM(s) Oral at bedtime  hydroxychloroquine 200 milliGRAM(s) Oral daily  lactated ringers. 1000 milliLiter(s) IV Continuous <Continuous>  loratadine 10 milliGRAM(s) Oral daily PRN  magnesium hydroxide Suspension 30 milliLiter(s) Oral daily PRN  melatonin 5 milliGRAM(s) Oral at bedtime  morphine  - Injectable 2 milliGRAM(s) IV Push every 4 hours PRN  multivitamin 1 Tablet(s) Oral daily  ondansetron Injectable 4 milliGRAM(s) IV Push every 6 hours PRN  oxyCODONE    IR 5 milliGRAM(s) Oral every 4 hours PRN  oxyCODONE    IR 10 milliGRAM(s) Oral every 4 hours PRN  polyethylene glycol 3350 17 Gram(s) Oral at bedtime  propranolol 10 milliGRAM(s) Oral two times a day  senna 2 Tablet(s) Oral at bedtime  simethicone 80 milliGRAM(s) Chew two times a day PRN      T(C): 36.5 (10-27-22 @ 08:30), Max: 36.6 (10-27-22 @ 05:15)  HR: 95 (10-27-22 @ 08:30) (87 - 97)  BP: 99/70 (10-27-22 @ 08:30) (97/67 - 103/66)  RR: 18 (10-27-22 @ 08:30) (17 - 18)  SpO2: 99% (10-27-22 @ 08:30) (95% - 100%)  Wt(kg): --    T(C): 36.5 (10-27-22 @ 08:30), Max: 36.6 (10-27-22 @ 05:15)  HR: 95 (10-27-22 @ 08:30) (87 - 97)  BP: 99/70 (10-27-22 @ 08:30) (97/67 - 103/66)  RR: 18 (10-27-22 @ 08:30) (17 - 18)  SpO2: 99% (10-27-22 @ 08:30) (95% - 100%)  Wt(kg): --    T(C): 36.5 (10-27-22 @ 08:30), Max: 36.6 (10-27-22 @ 05:15)  HR: 95 (10-27-22 @ 08:30) (87 - 97)  BP: 99/70 (10-27-22 @ 08:30) (97/67 - 103/66)  RR: 18 (10-27-22 @ 08:30) (17 - 18)  SpO2: 99% (10-27-22 @ 08:30) (95% - 100%)  Wt(kg): --    PHYSICAL EXAM:  Gen Appearance: ___no acute distress ___appropriate        Neuro: ___SILT feet____ EOM Intact Psych: AAOX__, ___mood/affect appropriate        Eyes: ___conjunctiva WNL  _____ Pupils equal and round        ENT: ___ears and nose atraumatic___ Hearing grossly intact        Neck: ___trachea midline, no visible masses ___thyroid without palpable mass    Resp: ___Nml WOB____No tactile fremitus ___clear to auscultation    Cardio: ___extremities free from edema ____pedal pulses palpable    GI/Abdomen: ___soft _____ Nontender______Nondistended_____HSM    Lymphatic: ___no palpable nodes in neck  ___no palpable nodes calves and feet    Skin/Wound: ___Incision, ___Dressing c/d/i,   ____surrounding tissues soft,  ___drain/chest tube present____    Muscular: EHL ___/5  Gastrocnemius___/5    ___absent clubbing/cyanosis      ASSESSMENT: This is a 42y old Female with a history of   S73.191A    No pertinent family history in first degree relatives    Family history of hypertension (Father)    Handoff    MEWS Score    Endometriosis    Migraines    Amenorrhea    Anxiety associated with depression    UPJ stricture, acquired    Congenital kidney disease    Febrile neutropenia    Insomnia    Anxiety    Panic disorder    Sjogren&#x27;s syndrome    Elective surgery    Sjogren&#x27;s syndrome    Developmental dysplasia of hip    Developmental dysplasia of hip    Periacetabular osteotomy    Dysplasia of hip    Endometriosis    Migraines    Anxiety associated with depression    UPJ stricture, acquired    Sjogren&#x27;s syndrome    Dysplasia of hip    Periacetabular osteotomy    Left hip arthroscopy    Right hip arthroscopy    S/p nephrectomy    S/P laparoscopic procedure    History of arthroscopy of shoulder    History of nephrectomy, unilateral    Congenital renal anomaly    [place] teeth extracted    Status post appendectomy    Status post colon resection    History of appendectomy    H/O hand surgery    Right hip arthroscopy    SysAdmin_VstLnk        Recommended Treatment PLAN:                 [facility] [phone]    Chief Complaint: right hip pain     HPI: Patient seen and examined today. This is a 41 y/o female PMH of endometriosis, migraines, anxiety, congenital kidney disease, POD 6 s/p periacetabular osteotomy, right hip arthroscopy with Dr. Mueller. Patient reports endorsing right hip pain that radiates to the right groin, associated with tingling. Patient reports groin pain increases with movement and walking, and states it feels like a "stinging". Patient also found to have a DVT to the RLE, reports endorsing pain to the RLE in the back of the calf. At home, patient reports she does not normally take pain medications aside from Tylenol, states she has had Dilaudid in the past which gave her a terrible migraine. Patient remains on Oxycodone 5-10 mg po q4h prn mod-severe pain and morphine 2 mg ivp q4h prn breakthrough, states the oxycodone helps but does not touch the pain in her groin. Patient reports Toradol has worked well for her at prior admissions however primary team hesitant to start NSAIDs d/t hx of nephrectomy. Patient is istop negative. Patient denies side effects from current pain regimen.     Pain is _X__ sharp ____dull ___burning ___achy ___ Intensity: ____ mild __X_mod _X__severe     Location __X__surgical site ____cervical _____lumbar ____abd ____upper ext___X_lower ext    Worse with __X__activity __X__movement _____physical therapy___ Rest    Improved with ___X_medication _X___rest ____physical therapy    ROS: Const:  __N_febrile   Eyes:___ENT:___CV: __N_chest pain  Resp: _N___sob  GI:_N__nausea __N_vomiting _N__abd pain ___npo ___clears __full diet __bm  :___ Musk: _Y__pain ___spasm  Skin:___ Neuro:  _N__sedation__N_confusion_N__ numbness ___weakness _Y__paresth  Psych:__anxiety  Endo:___ Heme:___Allergy:_BACTRIM, DILAUDID, LAMICTAL, PCN________, __Y_all others reviewed and negative    PAST MEDICAL & SURGICAL HISTORY:  Endometriosis  Migraines  Amenorrhea  Anxiety associated with depression  UPJ stricture, acquired  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  Insomnia  Anxiety  Panic disorder  Sjogren&#x27;s syndrome  S/P laparoscopic procedure  for endometriosis affecting bladder/colon/with ovary adhesion to uterus  History of arthroscopy of shoulder  Right Labrum Tear Repair-2012  History of nephrectomy, unilateral  right kidney 2010  Congenital renal anomaly  Right kidney corrective procedure (?), ureteral stent placment 2008.  [place] teeth extracted  Status post colon resection  History of appendectomy  H/O hand surgery  (L) Hand (Duputren Contracture (2020)    SH: _N__Tobacco   __N_Alcohol                          FH:FAMILY HISTORY:  Family history of hypertension (Father)    acetaminophen     Tablet .. 975 milliGRAM(s) Oral every 8 hours  apixaban 10 milliGRAM(s) Oral every 12 hours  bisacodyl Suppository 10 milliGRAM(s) Rectal daily PRN  busPIRone 10 milliGRAM(s) Oral two times a day  diazepam    Tablet 5 milliGRAM(s) Oral every 8 hours PRN  diphenhydrAMINE Injectable 25 milliGRAM(s) IV Push every 6 hours PRN  gabapentin 200 milliGRAM(s) Oral at bedtime  hydroxychloroquine 200 milliGRAM(s) Oral daily  lactated ringers. 1000 milliLiter(s) IV Continuous <Continuous>  loratadine 10 milliGRAM(s) Oral daily PRN  magnesium hydroxide Suspension 30 milliLiter(s) Oral daily PRN  melatonin 5 milliGRAM(s) Oral at bedtime  morphine  - Injectable 2 milliGRAM(s) IV Push every 4 hours PRN  multivitamin 1 Tablet(s) Oral daily  ondansetron Injectable 4 milliGRAM(s) IV Push every 6 hours PRN  oxyCODONE    IR 5 milliGRAM(s) Oral every 4 hours PRN  oxyCODONE    IR 10 milliGRAM(s) Oral every 4 hours PRN  polyethylene glycol 3350 17 Gram(s) Oral at bedtime  propranolol 10 milliGRAM(s) Oral two times a day  senna 2 Tablet(s) Oral at bedtime  simethicone 80 milliGRAM(s) Chew two times a day PRN      T(C): 36.5 (10-27-22 @ 08:30), Max: 36.6 (10-27-22 @ 05:15)  HR: 95 (10-27-22 @ 08:30) (87 - 97)  BP: 99/70 (10-27-22 @ 08:30) (97/67 - 103/66)  RR: 18 (10-27-22 @ 08:30) (17 - 18)  SpO2: 99% (10-27-22 @ 08:30) (95% - 100%)  Wt(kg): --    T(C): 36.5 (10-27-22 @ 08:30), Max: 36.6 (10-27-22 @ 05:15)  HR: 95 (10-27-22 @ 08:30) (87 - 97)  BP: 99/70 (10-27-22 @ 08:30) (97/67 - 103/66)  RR: 18 (10-27-22 @ 08:30) (17 - 18)  SpO2: 99% (10-27-22 @ 08:30) (95% - 100%)  Wt(kg): --    T(C): 36.5 (10-27-22 @ 08:30), Max: 36.6 (10-27-22 @ 05:15)  HR: 95 (10-27-22 @ 08:30) (87 - 97)  BP: 99/70 (10-27-22 @ 08:30) (97/67 - 103/66)  RR: 18 (10-27-22 @ 08:30) (17 - 18)  SpO2: 99% (10-27-22 @ 08:30) (95% - 100%)  Wt(kg): --    PHYSICAL EXAM:  Gen Appearance: _X__no acute distress _X__appropriate        Neuro: ___SILT feet____ EOM Intact Psych: AAOX_3_, _X__mood/affect appropriate        Eyes: __X_conjunctiva WNL  _X____ Pupils equal and round        ENT: _X__ears and nose atraumatic_X__ Hearing grossly intact        Neck: _X__trachea midline, no visible masses ___thyroid without palpable mass    Resp: __X_Nml WOB____No tactile fremitus ___clear to auscultation    Cardio: __X_extremities free from edema ____pedal pulses palpable    GI/Abdomen: ___soft _____ Nontender___X___Nondistended_____HSM    Lymphatic: ___no palpable nodes in neck  ___no palpable nodes calves and feet    Skin/Wound: ___Incision, __X_Dressing c/d/i,   ____surrounding tissues soft,  ___drain/chest tube present____    Muscular: EHL ___/5  Gastrocnemius___/5    __X_absent clubbing/cyanosis      ASSESSMENT: This is a 42y old Female with a history of endometriosis, migraines, anxiety, congenital kidney disease, POD 6 s/p periacetabular osteotomy, right hip arthroscopy with Dr. Mueller.     Recommended Treatment PLAN:                 [facility] [phone]    Chief Complaint: right hip pain     HPI: Patient seen and examined today. This is a 43 y/o female PMH of endometriosis, migraines, anxiety, congenital kidney disease, POD 6 s/p periacetabular osteotomy, right hip arthroscopy with Dr. Mueller. Patient reports endorsing right hip pain that radiates to the right groin, associated with tingling. Patient reports groin pain increases with movement and walking, and states it feels like a "stinging". Patient also found to have a DVT to the RLE, reports endorsing pain to the RLE in the back of the calf. At home, patient reports she does not normally take pain medications aside from Tylenol, states she has had Dilaudid in the past which gave her a terrible migraine. Patient remains on Oxycodone 5-10 mg po q4h prn mod-severe pain and morphine 2 mg ivp q4h prn breakthrough, states the oxycodone helps but does not touch the pain in her groin. Patient reports Toradol has worked well for her at prior admissions however primary team hesitant to start NSAIDs d/t hx of nephrectomy. Patient is istop negative. Patient denies side effects from current pain regimen.     Pain is _X__ sharp ____dull ___burning ___achy ___ Intensity: ____ mild __X_mod _X__severe     Location __X__surgical site ____cervical _____lumbar ____abd ____upper ext___X_lower ext    Worse with __X__activity __X__movement _____physical therapy___ Rest    Improved with ___X_medication _X___rest ____physical therapy    ROS: Const:  __N_febrile   Eyes:___ENT:___CV: __N_chest pain  Resp: _N___sob  GI:_N__nausea __N_vomiting _N__abd pain ___npo ___clears __full diet __bm  :___ Musk: _Y__pain ___spasm  Skin:___ Neuro:  _N__sedation__N_confusion_N__ numbness ___weakness _Y__paresth  Psych:__anxiety  Endo:___ Heme:___Allergy:_BACTRIM, DILAUDID, LAMICTAL, PCN________, __Y_all others reviewed and negative    PAST MEDICAL & SURGICAL HISTORY:  Endometriosis  Migraines  Amenorrhea  Anxiety associated with depression  UPJ stricture, acquired  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  Insomnia  Anxiety  Panic disorder  Sjogren&#x27;s syndrome  S/P laparoscopic procedure  for endometriosis affecting bladder/colon/with ovary adhesion to uterus  History of arthroscopy of shoulder  Right Labrum Tear Repair-2012  History of nephrectomy, unilateral  right kidney 2010  Congenital renal anomaly  Right kidney corrective procedure (?), ureteral stent placment 2008.  [place] teeth extracted  Status post colon resection  History of appendectomy  H/O hand surgery  (L) Hand (Duputren Contracture (2020)    SH: _N__Tobacco   __N_Alcohol                          FH:FAMILY HISTORY:  Family history of hypertension (Father)    acetaminophen     Tablet .. 975 milliGRAM(s) Oral every 8 hours  apixaban 10 milliGRAM(s) Oral every 12 hours  bisacodyl Suppository 10 milliGRAM(s) Rectal daily PRN  busPIRone 10 milliGRAM(s) Oral two times a day  diazepam    Tablet 5 milliGRAM(s) Oral every 8 hours PRN  diphenhydrAMINE Injectable 25 milliGRAM(s) IV Push every 6 hours PRN  gabapentin 200 milliGRAM(s) Oral at bedtime  hydroxychloroquine 200 milliGRAM(s) Oral daily  lactated ringers. 1000 milliLiter(s) IV Continuous <Continuous>  loratadine 10 milliGRAM(s) Oral daily PRN  magnesium hydroxide Suspension 30 milliLiter(s) Oral daily PRN  melatonin 5 milliGRAM(s) Oral at bedtime  morphine  - Injectable 2 milliGRAM(s) IV Push every 4 hours PRN  multivitamin 1 Tablet(s) Oral daily  ondansetron Injectable 4 milliGRAM(s) IV Push every 6 hours PRN  oxyCODONE    IR 5 milliGRAM(s) Oral every 4 hours PRN  oxyCODONE    IR 10 milliGRAM(s) Oral every 4 hours PRN  polyethylene glycol 3350 17 Gram(s) Oral at bedtime  propranolol 10 milliGRAM(s) Oral two times a day  senna 2 Tablet(s) Oral at bedtime  simethicone 80 milliGRAM(s) Chew two times a day PRN      T(C): 36.5 (10-27-22 @ 08:30), Max: 36.6 (10-27-22 @ 05:15)  HR: 95 (10-27-22 @ 08:30) (87 - 97)  BP: 99/70 (10-27-22 @ 08:30) (97/67 - 103/66)  RR: 18 (10-27-22 @ 08:30) (17 - 18)  SpO2: 99% (10-27-22 @ 08:30) (95% - 100%)  Wt(kg): --    T(C): 36.5 (10-27-22 @ 08:30), Max: 36.6 (10-27-22 @ 05:15)  HR: 95 (10-27-22 @ 08:30) (87 - 97)  BP: 99/70 (10-27-22 @ 08:30) (97/67 - 103/66)  RR: 18 (10-27-22 @ 08:30) (17 - 18)  SpO2: 99% (10-27-22 @ 08:30) (95% - 100%)  Wt(kg): --    T(C): 36.5 (10-27-22 @ 08:30), Max: 36.6 (10-27-22 @ 05:15)  HR: 95 (10-27-22 @ 08:30) (87 - 97)  BP: 99/70 (10-27-22 @ 08:30) (97/67 - 103/66)  RR: 18 (10-27-22 @ 08:30) (17 - 18)  SpO2: 99% (10-27-22 @ 08:30) (95% - 100%)  Wt(kg): --    PHYSICAL EXAM:  Gen Appearance: _X__no acute distress _X__appropriate        Neuro: ___SILT feet____ EOM Intact Psych: AAOX_3_, _X__mood/affect appropriate        Eyes: __X_conjunctiva WNL  _X____ Pupils equal and round        ENT: _X__ears and nose atraumatic_X__ Hearing grossly intact        Neck: _X__trachea midline, no visible masses ___thyroid without palpable mass    Resp: __X_Nml WOB____No tactile fremitus ___clear to auscultation    Cardio: __X_extremities free from edema ____pedal pulses palpable    GI/Abdomen: ___soft _____ Nontender___X___Nondistended_____HSM    Lymphatic: ___no palpable nodes in neck  ___no palpable nodes calves and feet    Skin/Wound: ___Incision, __X_Dressing c/d/i,   ____surrounding tissues soft,  ___drain/chest tube present____    Muscular: EHL ___/5  Gastrocnemius___/5    __X_absent clubbing/cyanosis      ASSESSMENT: This is a 42y old Female with a history of endometriosis, migraines, anxiety, congenital kidney disease, POD 6 s/p periacetabular osteotomy, right hip arthroscopy with Dr. Mueller.     Recommended Treatment PLAN:  1. Increase to Gabapentin 200 mg PO TID  2. Discontinue Oxycodone 5-10 mg PO q4h PRN moderate-severe pain  3. Start Morphine 7.5-15 mg PO q4h PRN moderate-severe pain  4. Morphine 2 mg IVP q4h PRN breakthrough pain   5. Tylenol 975 mg PO TID  6. Start daily lidocaine patch to right groin  Plan discussed with Dr. Alfonso

## 2022-10-27 NOTE — PROGRESS NOTE ADULT - SUBJECTIVE AND OBJECTIVE BOX
Ortho Note    Subjective:  Pt continues to reports Right HIp surgical site and pain proximal to her R LE DVT. Patient reports poor pain control with current pain medication regimen Discussed plan to consult PM for further recommendation   Denies CP, SOB, N/V, numbness/tingling   Patient requesting gluten free food, discussed plan to consult Nutrition for further assessment       Vital Signs Last 24 Hrs  T(C): 36.5 (10-27-22 @ 08:30), Max: 36.6 (10-27-22 @ 05:15)  T(F): 97.7 (10-27-22 @ 08:30), Max: 97.9 (10-27-22 @ 05:15)  HR: 95 (10-27-22 @ 08:30) (90 - 95)  BP: 99/70 (10-27-22 @ 08:30) (99/70 - 100/64)  BP(mean): --  RR: 18 (10-27-22 @ 08:30) (17 - 18)  SpO2: 99% (10-27-22 @ 08:30) (95% - 99%)  AVSS    Objective:    Physical Exam:  General: Pt Alert and oriented, NAD  Right HIp DSG C/D/I  Pulses: +2 pedal pulses, wwp toes  Sensation: silt intact to bilateral lower extremities   Motor: EHL/FHL/TA/GS - 5/5 bilateral lower extremities         Plan of Care:  A/P: 42yFemale s/p right hip arthroscopy and CHEYANNE, on 10/21  - afebrile  - Pain Control- morphine 2mg IVP Q4h prn breakthrough pain, tylenol 975mg PO Q8h, Diazepam 5mg PO Q8h prn muscle spasms, gabapentin 200mg PO at bedtime, Oxycodone 5-10mg PO Q4h prn moderate to severe pain   - DVT ppx: apixaban 10mg PO Q12h for RLE DVT x14 days started date 10-26)  - PT, WBS: Flat foot touchdown WB RLE  - appreciate medicine recs  - Consult for Pain Management   - Consult Nutrition  - Dispo- home pending pt clearance      Ortho Pager [phone]

## 2022-10-27 NOTE — PROGRESS NOTE ADULT - SUBJECTIVE AND OBJECTIVE BOX
Procedure: R hip scope/CHEYANNE   Surgeon: Zeeshan/Ervin     DVT found on U/S yesterday, did not work with PT yesterday. Expresses frustration over slow progress. Denies CP, SOB, N/V, numbness/tingling     Vital Signs Last 24 Hrs  T(C): 36.6 (27 Oct 2022 05:15), Max: 36.6 (27 Oct 2022 05:15)  T(F): 97.9 (27 Oct 2022 05:15), Max: 97.9 (27 Oct 2022 05:15)  HR: 90 (27 Oct 2022 05:15) (87 - 97)  BP: 100/64 (27 Oct 2022 05:15) (97/67 - 103/70)  BP(mean): --  RR: 17 (27 Oct 2022 05:15) (17 - 17)  SpO2: 95% (27 Oct 2022 05:15) (95% - 100%)    Parameters below as of 27 Oct 2022 05:15  Patient On (Oxygen Delivery Method): room air      General: Pt Alert and oriented, NAD  R hip DSG C/D/I  Pulses: 2+ dP   Sensation: silt sural/saph/dpn/spn/tib   Motor: 5/5 ehl/fhl/ta/gs  +R Crys, R calf TTP    Labs:                                 11.2   7.39  )-----------( 234      ( 27 Oct 2022 07:02 )             34.4     10-27    140  |  106  |  10  ----------------------------<  111<H>  4.3   |  26  |  0.92    Ca    8.3<L>      27 Oct 2022 07:02                   A/P: 42yFemale s/p R hip scope/CHEYANNE by Dr. Mueller/Zeeshan on 10-22  - Stable  - Pain Control  - DVT ppx: Eliquis (has DVT)  - Post op abx: ancef  - WBS: foot flat touch down   - Encourage mobilization with PT today - no bed rest  - Dispo: HPT when clears    Ortho Pager [phone]

## 2022-10-28 VITALS
HEART RATE: 92 BPM | SYSTOLIC BLOOD PRESSURE: 121 MMHG | RESPIRATION RATE: 18 BRPM | DIASTOLIC BLOOD PRESSURE: 83 MMHG | TEMPERATURE: 98 F | OXYGEN SATURATION: 96 %

## 2022-10-28 LAB
ANION GAP SERPL CALC-SCNC: 8 MMOL/L — SIGNIFICANT CHANGE UP (ref 5–17)
BUN SERPL-MCNC: 11 MG/DL — SIGNIFICANT CHANGE UP (ref 7–23)
CALCIUM SERPL-MCNC: 9 MG/DL — SIGNIFICANT CHANGE UP (ref 8.4–10.5)
CHLORIDE SERPL-SCNC: 107 MMOL/L — SIGNIFICANT CHANGE UP (ref 96–108)
CO2 SERPL-SCNC: 27 MMOL/L — SIGNIFICANT CHANGE UP (ref 22–31)
CREAT SERPL-MCNC: 0.98 MG/DL — SIGNIFICANT CHANGE UP (ref 0.5–1.3)
EGFR: 75 ML/MIN/1.73M2 — SIGNIFICANT CHANGE UP
GLUCOSE SERPL-MCNC: 111 MG/DL — HIGH (ref 70–99)
HCT VFR BLD CALC: 34.4 % — LOW (ref 34.5–45)
HGB BLD-MCNC: 11.4 G/DL — LOW (ref 11.5–15.5)
MCHC RBC-ENTMCNC: 28.4 PG — SIGNIFICANT CHANGE UP (ref 27–34)
MCHC RBC-ENTMCNC: 33.1 GM/DL — SIGNIFICANT CHANGE UP (ref 32–36)
MCV RBC AUTO: 85.8 FL — SIGNIFICANT CHANGE UP (ref 80–100)
NRBC # BLD: 0 /100 WBCS — SIGNIFICANT CHANGE UP (ref 0–0)
PLATELET # BLD AUTO: 257 K/UL — SIGNIFICANT CHANGE UP (ref 150–400)
POTASSIUM SERPL-MCNC: 4.7 MMOL/L — SIGNIFICANT CHANGE UP (ref 3.5–5.3)
POTASSIUM SERPL-SCNC: 4.7 MMOL/L — SIGNIFICANT CHANGE UP (ref 3.5–5.3)
RBC # BLD: 4.01 M/UL — SIGNIFICANT CHANGE UP (ref 3.8–5.2)
RBC # FLD: 11.8 % — SIGNIFICANT CHANGE UP (ref 10.3–14.5)
SODIUM SERPL-SCNC: 142 MMOL/L — SIGNIFICANT CHANGE UP (ref 135–145)
WBC # BLD: 8.65 K/UL — SIGNIFICANT CHANGE UP (ref 3.8–10.5)
WBC # FLD AUTO: 8.65 K/UL — SIGNIFICANT CHANGE UP (ref 3.8–10.5)

## 2022-10-28 PROCEDURE — 99232 SBSQ HOSP IP/OBS MODERATE 35: CPT

## 2022-10-28 PROCEDURE — 76000 FLUOROSCOPY <1 HR PHYS/QHP: CPT

## 2022-10-28 PROCEDURE — 97161 PT EVAL LOW COMPLEX 20 MIN: CPT

## 2022-10-28 PROCEDURE — C1713: CPT

## 2022-10-28 PROCEDURE — 36415 COLL VENOUS BLD VENIPUNCTURE: CPT

## 2022-10-28 PROCEDURE — 86850 RBC ANTIBODY SCREEN: CPT

## 2022-10-28 PROCEDURE — 97110 THERAPEUTIC EXERCISES: CPT

## 2022-10-28 PROCEDURE — 85027 COMPLETE CBC AUTOMATED: CPT

## 2022-10-28 PROCEDURE — 97530 THERAPEUTIC ACTIVITIES: CPT

## 2022-10-28 PROCEDURE — 86900 BLOOD TYPING SEROLOGIC ABO: CPT

## 2022-10-28 PROCEDURE — 80048 BASIC METABOLIC PNL TOTAL CA: CPT

## 2022-10-28 PROCEDURE — 86901 BLOOD TYPING SEROLOGIC RH(D): CPT

## 2022-10-28 PROCEDURE — 97116 GAIT TRAINING THERAPY: CPT

## 2022-10-28 PROCEDURE — C1889: CPT

## 2022-10-28 PROCEDURE — 93970 EXTREMITY STUDY: CPT

## 2022-10-28 PROCEDURE — 97535 SELF CARE MNGMENT TRAINING: CPT

## 2022-10-28 RX ORDER — HYDROXYCHLOROQUINE SULFATE 200 MG
1 TABLET ORAL
Qty: 0 | Refills: 0 | DISCHARGE

## 2022-10-28 RX ORDER — LORATADINE 10 MG/1
1 TABLET ORAL
Qty: 0 | Refills: 0 | DISCHARGE
Start: 2022-10-28

## 2022-10-28 RX ORDER — GABAPENTIN 400 MG/1
200 CAPSULE ORAL
Qty: 0 | Refills: 0 | DISCHARGE

## 2022-10-28 RX ORDER — SIMETHICONE 80 MG/1
1 TABLET, CHEWABLE ORAL
Qty: 0 | Refills: 0 | DISCHARGE
Start: 2022-10-28

## 2022-10-28 RX ORDER — APIXABAN 2.5 MG/1
2 TABLET, FILM COATED ORAL
Qty: 360 | Refills: 0
Start: 2022-10-28 | End: 2023-01-25

## 2022-10-28 RX ORDER — SUMATRIPTAN SUCCINATE 4 MG/.5ML
100 INJECTION, SOLUTION SUBCUTANEOUS ONCE
Refills: 0 | Status: COMPLETED | OUTPATIENT
Start: 2022-10-28 | End: 2022-10-28

## 2022-10-28 RX ORDER — LIDOCAINE 4 G/100G
1 CREAM TOPICAL
Qty: 7 | Refills: 0
Start: 2022-10-28 | End: 2022-11-03

## 2022-10-28 RX ORDER — OXYCODONE HYDROCHLORIDE 5 MG/1
5 TABLET ORAL EVERY 4 HOURS
Refills: 0 | Status: DISCONTINUED | OUTPATIENT
Start: 2022-10-28 | End: 2022-10-28

## 2022-10-28 RX ORDER — PROPRANOLOL HCL 160 MG
1 CAPSULE, EXTENDED RELEASE 24HR ORAL
Qty: 0 | Refills: 0 | DISCHARGE
Start: 2022-10-28

## 2022-10-28 RX ORDER — SUMATRIPTAN SUCCINATE 4 MG/.5ML
50 INJECTION, SOLUTION SUBCUTANEOUS ONCE
Refills: 0 | Status: DISCONTINUED | OUTPATIENT
Start: 2022-10-28 | End: 2022-10-28

## 2022-10-28 RX ORDER — OXYCODONE HYDROCHLORIDE 5 MG/1
10 TABLET ORAL EVERY 4 HOURS
Refills: 0 | Status: DISCONTINUED | OUTPATIENT
Start: 2022-10-28 | End: 2022-10-28

## 2022-10-28 RX ORDER — OXYCODONE HYDROCHLORIDE 5 MG/1
1 TABLET ORAL
Qty: 42 | Refills: 0
Start: 2022-10-28 | End: 2022-11-03

## 2022-10-28 RX ORDER — ONDANSETRON 8 MG/1
4 TABLET, FILM COATED ORAL ONCE
Refills: 0 | Status: COMPLETED | OUTPATIENT
Start: 2022-10-28 | End: 2022-10-28

## 2022-10-28 RX ORDER — IBUPROFEN 200 MG
1 TABLET ORAL
Qty: 0 | Refills: 0 | DISCHARGE

## 2022-10-28 RX ORDER — LIDOCAINE 4 G/100G
1 CREAM TOPICAL DAILY
Refills: 0 | Status: DISCONTINUED | OUTPATIENT
Start: 2022-10-28 | End: 2022-10-28

## 2022-10-28 RX ORDER — SENNA PLUS 8.6 MG/1
2 TABLET ORAL
Qty: 0 | Refills: 0 | DISCHARGE
Start: 2022-10-28

## 2022-10-28 RX ORDER — PROPRANOLOL HCL 160 MG
1 CAPSULE, EXTENDED RELEASE 24HR ORAL
Qty: 0 | Refills: 0 | DISCHARGE

## 2022-10-28 RX ORDER — ONDANSETRON 8 MG/1
1 TABLET, FILM COATED ORAL
Qty: 9 | Refills: 0
Start: 2022-10-28 | End: 2022-10-30

## 2022-10-28 RX ORDER — LANOLIN ALCOHOL/MO/W.PET/CERES
1 CREAM (GRAM) TOPICAL
Qty: 0 | Refills: 0 | DISCHARGE
Start: 2022-10-28

## 2022-10-28 RX ORDER — GABAPENTIN 400 MG/1
2 CAPSULE ORAL
Qty: 42 | Refills: 0
Start: 2022-10-28 | End: 2022-11-03

## 2022-10-28 RX ORDER — HYDROXYCHLOROQUINE SULFATE 200 MG
1 TABLET ORAL
Qty: 0 | Refills: 0 | DISCHARGE
Start: 2022-10-28

## 2022-10-28 RX ADMIN — LIDOCAINE 1 PATCH: 4 CREAM TOPICAL at 11:54

## 2022-10-28 RX ADMIN — Medication 10 MILLIGRAM(S): at 06:19

## 2022-10-28 RX ADMIN — Medication 975 MILLIGRAM(S): at 03:59

## 2022-10-28 RX ADMIN — SUMATRIPTAN SUCCINATE 100 MILLIGRAM(S): 4 INJECTION, SOLUTION SUBCUTANEOUS at 13:12

## 2022-10-28 RX ADMIN — Medication 975 MILLIGRAM(S): at 10:52

## 2022-10-28 RX ADMIN — SUMATRIPTAN SUCCINATE 100 MILLIGRAM(S): 4 INJECTION, SOLUTION SUBCUTANEOUS at 14:12

## 2022-10-28 RX ADMIN — Medication 975 MILLIGRAM(S): at 02:59

## 2022-10-28 RX ADMIN — APIXABAN 10 MILLIGRAM(S): 2.5 TABLET, FILM COATED ORAL at 06:20

## 2022-10-28 RX ADMIN — OXYCODONE HYDROCHLORIDE 5 MILLIGRAM(S): 5 TABLET ORAL at 10:51

## 2022-10-28 RX ADMIN — Medication 200 MILLIGRAM(S): at 11:53

## 2022-10-28 RX ADMIN — OXYCODONE HYDROCHLORIDE 5 MILLIGRAM(S): 5 TABLET ORAL at 11:51

## 2022-10-28 RX ADMIN — GABAPENTIN 200 MILLIGRAM(S): 400 CAPSULE ORAL at 06:42

## 2022-10-28 RX ADMIN — GABAPENTIN 200 MILLIGRAM(S): 400 CAPSULE ORAL at 13:12

## 2022-10-28 RX ADMIN — Medication 1 TABLET(S): at 11:54

## 2022-10-28 RX ADMIN — LIDOCAINE 1 PATCH: 4 CREAM TOPICAL at 11:55

## 2022-10-28 RX ADMIN — ONDANSETRON 4 MILLIGRAM(S): 8 TABLET, FILM COATED ORAL at 13:38

## 2022-10-28 RX ADMIN — LIDOCAINE 1 PATCH: 4 CREAM TOPICAL at 04:34

## 2022-10-28 RX ADMIN — Medication 975 MILLIGRAM(S): at 11:52

## 2022-10-28 NOTE — PROGRESS NOTE ADULT - NS ATTEND AMEND GEN_ALL_CORE FT
42y old Female with a history of endometriosis, migraines, anxiety, congenital kidney disease, POD 7 s/p periacetabular osteotomy, right hip arthroscopy with Dr. Mueller. Pt evaluated for pain control. Chart reviewed, medications/allergies reviewed. Pain management plan/options discussed. We will continue the comprehensive medical management plan and titrate to pain control and side effects. We will follow up. Dr. Alfonso

## 2022-10-28 NOTE — PROGRESS NOTE ADULT - SUBJECTIVE AND OBJECTIVE BOX
Pain Management Progress Note - [facility] [phone]    HPI: Patient seen and examined today. Patient reports right hip/groin pain moderately improved compared to yesterday, reports she would like to switch back to Oxycodone PO as Morphine PO gave her a headache. Patient reports endorsing pain to the right calf, lidocaine patch ordered. Patient tolerating PT well and overall reports improvement in pain. Patient denies side effects from current pain regimen.     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  Insomnia  Anxiety  Panic disorder  Sjogren&#x27;s syndrome  S/P laparoscopic procedure  for endometriosis affecting bladder/colon/with ovary adhesion to uterus  History of arthroscopy of shoulder  Right Labrum Tear Repair-2012  History of nephrectomy, unilateral  right kidney 2010  Congenital renal anomaly  Right kidney corrective procedure (?), ureteral stent placment 2008.  [place] teeth extracted  Status post colon resection  History of appendectomy  H/O hand surgery  (L) Hand (Duputren Contracture (2020)    MEDICATIONS:  SUMAtriptan  lidocaine   4% Patch  SUMAtriptan  oxyCODONE    IR  oxyCODONE    IR  lidocaine   4% Patch  gabapentin  morphine  IR  morphine  IR  apixaban  morphine  - Injectable  oxyCODONE    IR  bisacodyl Suppository  tranexamic acid 100 mG/mL Injectable (Rx Quick Charge)  rocuronium 10 mG/mL Injectable 5 mL Vial (Rx Quick Charge)  ondansetron 2 mG/mL Injectable 2 mL (Rx Quick Charge)  morphine 10 mG/mL Injectable (Rx Quick Charge)  midazolam  1 mG/mL Injectable (Rx Quick Charge)  lidocaine PF 2% Injectable (Rx Quick Charge)  ketamine 500 mG/10 mL Injectable (Rx Quick Charge)  glycopyrrolate 0.2 mG/mL Injectable (Rx Quick Charge)  fentaNYL 50 MICROGram(s)/mL Injectable 2 mL (Rx Quick Charge)  dexAMETHasone 10 mG/mL Injectable (Rx Quick Charge)  ceFAZolin 1 Gram(s) Injectable (Rx Quick Charge)  acetaminophen 10 mG/mL Injectable IVPB (Rx Quick Charge)  sodium chloride 0.9% Bolus  BUpivacaine liposome 1.3% Injectable 20 mL (Rx Quick Charge)  loratadine  simethicone  melatonin  diazepam    Tablet  aspirin  chewable  ceFAZolin   IVPB  scopolamine 1 mG/72 Hr(s) Patch  ALPRAZolam  doxazosin  diphenhydrAMINE Injectable  morphine  - Injectable  oxyCODONE    IR  oxyCODONE    IR  oxyCODONE  ER Tablet  gabapentin  busPIRone  hydroxychloroquine  propranolol  multivitamin  polyethylene glycol 3350  senna  magnesium hydroxide Suspension  ondansetron Injectable  acetaminophen     Tablet ..  lactated ringers.  povidone iodine 5% Nasal Swab  acetaminophen     Tablet ..  celecoxib  gabapentin    ROS: Const:  _N__febrile   Eyes:___ENT:___CV: __N_chest pain  Resp: _N___sob  GI:__N_nausea _N__vomiting _N___abd pain ___npo ___clears ___full diet __bm  :___ Musk: __Y_pain ___spasm  Skin:___ Neuro:  __N_sedation__N_confusion____ numbness ___weakness __Y_paresthesia  Psych:___anxiety  Endo:___ Heme:___Allergy:_BACTRIM, DILAUDID, LAMICTAL, PCN__    10-28 @ 06:060.98 mg/d  Hemoglobin: 11.4 g/dL (10-28 @ 06:06)  Hemoglobin: 11.2 g/dL (10-27 @ 07:02)  T(C): 36.9 (10-28-22 @ 08:36), Max: 36.9 (10-27-22 @ 20:50)  HR: 92 (10-28-22 @ 08:36) (83 - 100)  BP: 121/83 (10-28-22 @ 08:36) (97/60 - 121/83)  RR: 18 (10-28-22 @ 08:36) (17 - 18)  SpO2: 96% (10-28-22 @ 08:36) (95% - 100%)  Wt(kg): --     PHYSICAL EXAM:  Gen Appearance: ___no acute distress X__appropriate         Neuro: ___SILT feet____ EOM Intact Psych: AAOX_3_, ___Xmood/affect appropriate        Eyes: __X_conjunctiva WNL  ____X_ Pupils equal and round        ENT: _X__ears and nose atraumatic_X__ Hearing grossly intact        Neck: _X__trachea midline, no visible masses ___thyroid without palpable mass    Resp: _X__Nml WOB____No tactile fremitus ___clear to auscultation    Cardio: __X_extremities free from edema ____pedal pulses palpable    GI/Abdomen: ___soft _____ Nontender_____X_Nondistended_____HSM    Lymphatic: ___no palpable nodes in neck  ___no palpable nodes calves and feet    Skin/Wound: ___Incision, ___Dressing c/d/i,   ____surrounding tissues soft,  ___drain/chest tube present____    Muscular: EHL ___/5  Gastrocnemius___/5    __X_absent clubbing/cyanosis         ASSESSMENT:  This is a 42y old Female with a history of endometriosis, migraines, anxiety, congenital kidney disease, POD 7 s/p periacetabular osteotomy, right hip arthroscopy with Dr. Mueller.     Recommended Treatment PLAN:  1. Gabapentin 200 mg PO TID  2. Start Oxycodone 5-10 mg PO q4h PRN moderate-severe pain  3. Discontinue Morphine 7.5-15 mg PO q4h PRN moderate-severe pain  4. Morphine 2 mg IVP q4h PRN breakthrough pain   5. Tylenol 975 mg PO TID  6. Start daily lidocaine patch to right groin. Start daily lidocaine patch to right calf  Plan discussed with Dr. Alfonso

## 2022-10-28 NOTE — PROGRESS NOTE ADULT - SUBJECTIVE AND OBJECTIVE BOX
Procedure: R hip scope/CHEYANNE   Surgeon: Zeeshan/Ervin     DVT found on U/S yesterday, did not work with PT yesterday. Expresses frustration over slow progress. Denies CP, SOB, N/V, numbness/tingling     Vital Signs Last 24 Hrs  T(C): 36.9 (28 Oct 2022 05:30), Max: 36.9 (27 Oct 2022 20:50)  T(F): 98.5 (28 Oct 2022 05:30), Max: 98.5 (28 Oct 2022 05:30)  HR: 90 (28 Oct 2022 05:30) (83 - 100)  BP: 120/70 (28 Oct 2022 05:30) (97/60 - 120/70)  BP(mean): --  RR: 17 (28 Oct 2022 05:30) (17 - 18)  SpO2: 95% (28 Oct 2022 05:30) (95% - 100%)    Parameters below as of 28 Oct 2022 05:30  Patient On (Oxygen Delivery Method): room air      General: Pt Alert and oriented, NAD  R hip DSG C/D/I  Pulses: 2+ dP   Sensation: silt sural/saph/dpn/spn/tib   Motor: 5/5 ehl/fhl/ta/gs      Labs:                                 11.4   8.65  )-----------( 257      ( 28 Oct 2022 06:06 )             34.4     10-28    142  |  107  |  11  ----------------------------<  111<H>  4.7   |  27  |  0.98    Ca    9.0      28 Oct 2022 06:06                   A/P: 42yFemale s/p R hip scope/CHEYANNE by Dr. Mueller/Zeeshan on 10-22  - Stable  - Pain Control  - DVT ppx: Eliquis (has DVT)  - Post op abx: ancef  - WBS: 20% foot flat touch down   - Encourage mobilization with PT - no bed rest  - Dispo: HPT when clears    Ortho Pager [phone] Procedure: R hip scope/CHEYANNE   Surgeon: Zeeshan/Ervin     Pt did well with PT yesterday, states she ambulated in the hallway and feels like she's made some progress. Hoping to go home today. Denies CP, SOB, N/V, numbness/tingling     Vital Signs Last 24 Hrs  T(C): 36.9 (28 Oct 2022 05:30), Max: 36.9 (27 Oct 2022 20:50)  T(F): 98.5 (28 Oct 2022 05:30), Max: 98.5 (28 Oct 2022 05:30)  HR: 90 (28 Oct 2022 05:30) (83 - 100)  BP: 120/70 (28 Oct 2022 05:30) (97/60 - 120/70)  BP(mean): --  RR: 17 (28 Oct 2022 05:30) (17 - 18)  SpO2: 95% (28 Oct 2022 05:30) (95% - 100%)    Parameters below as of 28 Oct 2022 05:30  Patient On (Oxygen Delivery Method): room air      General: Pt Alert and oriented, NAD  R hip DSG C/D/I  Pulses: 2+ dP   Sensation: silt sural/saph/dpn/spn/tib   Motor: 5/5 ehl/fhl/ta/gs      Labs:                                 11.4   8.65  )-----------( 257      ( 28 Oct 2022 06:06 )             34.4     10-28    142  |  107  |  11  ----------------------------<  111<H>  4.7   |  27  |  0.98    Ca    9.0      28 Oct 2022 06:06               A/P: 42yFemale s/p R hip scope/CHEYANNE by Dr. Mueller/Zeeshan on 10-22  - Stable  - Pain Control  - DVT ppx: Eliquis (has DVT)  - Post op abx: ancef  - WBS: 20% foot flat touch down   - Encourage mobilization with PT - no bed rest  - Dispo: HPT when clears    Ortho Pager [phone]

## 2022-10-28 NOTE — PROGRESS NOTE ADULT - PROVIDER SPECIALTY LIST ADULT
Orthopedics
Internal Medicine
Orthopedics
Pain Medicine
Internal Medicine

## 2022-10-28 NOTE — PROGRESS NOTE ADULT - SUBJECTIVE AND OBJECTIVE BOX
Ortho Note    Subjective:  Pt reporting poor pain control with Morphine PO. Patient requesting Oxycodone PO.   Patient reports she has a migraine and is requesting her home naratriptan   Denies CP, SOB, N/V, numbness/tingling       Vital Signs Last 24 Hrs  T(C): 36.9 (10-28-22 @ 08:36), Max: 36.9 (10-28-22 @ 05:30)  T(F): 98.4 (10-28-22 @ 08:36), Max: 98.5 (10-28-22 @ 05:30)  HR: 92 (10-28-22 @ 08:36) (90 - 92)  BP: 121/83 (10-28-22 @ 08:36) (120/70 - 121/83)  BP(mean): --  RR: 18 (10-28-22 @ 08:36) (17 - 18)  SpO2: 96% (10-28-22 @ 08:36) (95% - 96%)  AVSS    Objective:    Physical Exam:  General: Pt Alert and oriented, NAD  Right HIp DSG C/D/I  Pulses: +2 pedal pulses, wwp toes  Sensation: silt intact to bilateral lower extremities   Motor: EHL/FHL/TA/GS - 5/5 bilateral lower extremities         Plan of Care:  A/P: 42yFemale s/p right hip arthroscopy and CHEYANNE, on 10/21  - afebrile  - Pain Control- morphine 2mg IVP Q4h prn breakthrough pain, tylenol 975mg PO Q8h, Diazepam 5mg PO Q8h prn muscle spasms, gabapentin 200mg PO at bedtime, Oxycodone 5-10mg PO Q4h prn moderate to severe pain   - DVT ppx: apixaban 10mg PO Q12h for RLE DVT x3 months  started date 10-26)  - PT, WBS: Flat foot touchdown WB RLE  - appreciate medicine recs  - appreciate pain management recs   - Patient can use home naratriptan if she provides it to the pharmacy for verification  - Dispo- home pending pt clearance      Ortho Pager [phone]

## 2022-10-28 NOTE — PROGRESS NOTE ADULT - ASSESSMENT
42 year old female admitted for right hip pain     Impression and Plan   # Right Hip Pain s/p Right Hip Arthroscopy and CHEYANNE on 10/21  - pain control ,Weightbearing status , Dressing changes  per ortho team    # Acute Distal Right Leg DVT   -Po Apixaban 10mg po bid for 7 days followed by 5mg po bid for total of 3 months   -provoked by recent surgery     # Hx of Sjogren's on Plaquenil    # Hx Endometriosis s/p Partial Colectomy     # Unilateral Kidney due right Nephrectomy ( Congenital Anomaly)       Dispo : medically ready for discharge home  
42 year old female admitted for right hip pain     Impression and Plan   # Right Hip Pain s/p Right Hip Arthroscopy and CHEYANNE on 10/21  - pain control ,Weightbearing status , Dressing changes  per ortho team    # Acute Distal Right Leg DVT   -Po Apixaban 10mg po bid for 7 days followed by 5mg po bid for total of 3 months   -provoked by recent surgery     # Hx of Sjogren's on Plaquenil    # Hx Endometriosis s/p Partial Colectomy     # Unilateral Kidney due right Nephrectomy ( Congenital Anomaly)

## 2022-10-28 NOTE — PROGRESS NOTE ADULT - REASON FOR ADMISSION
right hip pain

## 2022-10-28 NOTE — PROGRESS NOTE ADULT - SUBJECTIVE AND OBJECTIVE BOX
Patient is a 42y old  Female who presents with a chief complaint of right hip pain (27 Oct 2022 11:33)        SUBJECTIVE:  Patient was seen and examined at bedside.    Overnight Events :  right leg pain better  , no shortness of breath no chest pain       Review of systems: 12 point Review of systems negative unless otherwise documented elsewhere in note.     Diet, Regular:   Gluten-Gliadin Restricted (10-26-22 @ 10:23) [Active]      MEDICATIONS:  MEDICATIONS  (STANDING):  acetaminophen     Tablet .. 975 milliGRAM(s) Oral every 8 hours  apixaban 10 milliGRAM(s) Oral every 12 hours  busPIRone 10 milliGRAM(s) Oral two times a day  gabapentin 200 milliGRAM(s) Oral three times a day  hydroxychloroquine 200 milliGRAM(s) Oral daily  lactated ringers. 1000 milliLiter(s) (90 mL/Hr) IV Continuous <Continuous>  lidocaine   4% Patch 1 Patch Transdermal daily  melatonin 5 milliGRAM(s) Oral at bedtime  multivitamin 1 Tablet(s) Oral daily  polyethylene glycol 3350 17 Gram(s) Oral at bedtime  propranolol 10 milliGRAM(s) Oral two times a day  senna 2 Tablet(s) Oral at bedtime    MEDICATIONS  (PRN):  bisacodyl Suppository 10 milliGRAM(s) Rectal daily PRN Constipation  diazepam    Tablet 5 milliGRAM(s) Oral every 8 hours PRN muscle spasm  diphenhydrAMINE Injectable 25 milliGRAM(s) IV Push every 6 hours PRN Rash and/or Itching  loratadine 10 milliGRAM(s) Oral daily PRN itching  magnesium hydroxide Suspension 30 milliLiter(s) Oral daily PRN Constipation  morphine  - Injectable 2 milliGRAM(s) IV Push every 4 hours PRN breakthrough pain  morphine  IR 7.5 milliGRAM(s) Oral every 4 hours PRN Moderate Pain (4 - 6)  morphine  IR 15 milliGRAM(s) Oral every 4 hours PRN Severe Pain (7 - 10)  ondansetron Injectable 4 milliGRAM(s) IV Push every 6 hours PRN Nausea and/or Vomiting  simethicone 80 milliGRAM(s) Chew two times a day PRN Gas      Allergies    adhesives (Hives; Rash)  Bactrim (Unknown)  chlorhexidine topical (Rash)  Dilaudid (Unknown)  Lamictal (Other)  penicillin (Hives)  Steri strips:    big blood blisters (Other)    Intolerances        OBJECTIVE:  Vital Signs Last 24 Hrs  T(C): 36.9 (28 Oct 2022 08:36), Max: 36.9 (27 Oct 2022 20:50)  T(F): 98.4 (28 Oct 2022 08:36), Max: 98.5 (28 Oct 2022 05:30)  HR: 92 (28 Oct 2022 08:36) (83 - 100)  BP: 121/83 (28 Oct 2022 08:36) (97/60 - 121/83)  BP(mean): --  RR: 18 (28 Oct 2022 08:36) (17 - 18)  SpO2: 96% (28 Oct 2022 08:36) (95% - 100%)    Parameters below as of 28 Oct 2022 08:36  Patient On (Oxygen Delivery Method): room air          PHYSICAL EXAM:  Gen: Resting in bed at time of exam, not in distress   HEENT: moist mucosa, no lesions   Neck: supple, trachea at midline  CV: RRR, +S1/S2  Pulm: no wheezing , no crackles  no increase in work of breathing  Abd: soft, NTND  Skin: warm and dry, no new rashes   Ext: moving all 4 extremities spontaneously , Right leg swollen   Neuro: AOx3, no gross focal neurological deficits  Psych: affect and behavior appropriate, pleasant at time of interview    LABS:                                   11.4   8.65  )-----------( 257      ( 28 Oct 2022 06:06 )             34.4     10-28    142  |  107  |  11  ----------------------------<  111<H>  4.7   |  27  |  0.98    Ca    9.0      28 Oct 2022 06:06                MICRODATA:      RADIOLOGY/OTHER STUDIES:

## 2022-10-31 ENCOUNTER — NON-APPOINTMENT (OUTPATIENT)
Age: 42
End: 2022-10-31

## 2022-11-01 ENCOUNTER — APPOINTMENT (OUTPATIENT)
Dept: INTERNAL MEDICINE | Facility: CLINIC | Age: 42
End: 2022-11-01

## 2022-11-01 PROCEDURE — 99441: CPT | Mod: GC

## 2022-11-07 DIAGNOSIS — F41.8 OTHER SPECIFIED ANXIETY DISORDERS: ICD-10-CM

## 2022-11-07 DIAGNOSIS — I82.451 ACUTE EMBOLISM AND THROMBOSIS OF RIGHT PERONEAL VEIN: ICD-10-CM

## 2022-11-07 DIAGNOSIS — Q65.89 OTHER SPECIFIED CONGENITAL DEFORMITIES OF HIP: ICD-10-CM

## 2022-11-07 DIAGNOSIS — Z88.8 ALLERGY STATUS TO OTHER DRUGS, MEDICAMENTS AND BIOLOGICAL SUBSTANCES STATUS: ICD-10-CM

## 2022-11-07 DIAGNOSIS — G43.909 MIGRAINE, UNSPECIFIED, NOT INTRACTABLE, WITHOUT STATUS MIGRAINOSUS: ICD-10-CM

## 2022-11-07 DIAGNOSIS — M35.00 SJOGREN SYNDROME, UNSPECIFIED: ICD-10-CM

## 2022-11-07 DIAGNOSIS — Z88.0 ALLERGY STATUS TO PENICILLIN: ICD-10-CM

## 2022-11-07 DIAGNOSIS — I95.9 HYPOTENSION, UNSPECIFIED: ICD-10-CM

## 2022-11-07 DIAGNOSIS — M25.851 OTHER SPECIFIED JOINT DISORDERS, RIGHT HIP: ICD-10-CM

## 2022-11-07 DIAGNOSIS — Z88.6 ALLERGY STATUS TO ANALGESIC AGENT: ICD-10-CM

## 2022-11-07 DIAGNOSIS — Q60.0 RENAL AGENESIS, UNILATERAL: ICD-10-CM

## 2022-11-15 ENCOUNTER — APPOINTMENT (OUTPATIENT)
Dept: INTERNAL MEDICINE | Facility: CLINIC | Age: 42
End: 2022-11-15

## 2022-11-15 VITALS
DIASTOLIC BLOOD PRESSURE: 63 MMHG | TEMPERATURE: 97.6 F | OXYGEN SATURATION: 97 % | SYSTOLIC BLOOD PRESSURE: 95 MMHG | HEART RATE: 89 BPM

## 2022-11-15 DIAGNOSIS — N39.0 URINARY TRACT INFECTION, SITE NOT SPECIFIED: ICD-10-CM

## 2022-11-15 DIAGNOSIS — R42 DIZZINESS AND GIDDINESS: ICD-10-CM

## 2022-11-15 PROCEDURE — 99214 OFFICE O/P EST MOD 30 MIN: CPT | Mod: GC,25

## 2022-11-15 PROCEDURE — 36415 COLL VENOUS BLD VENIPUNCTURE: CPT

## 2022-11-16 ENCOUNTER — TRANSCRIPTION ENCOUNTER (OUTPATIENT)
Age: 42
End: 2022-11-16

## 2022-11-16 ENCOUNTER — NON-APPOINTMENT (OUTPATIENT)
Age: 42
End: 2022-11-16

## 2022-11-16 PROBLEM — R42 DIZZINESS: Status: ACTIVE | Noted: 2018-10-03

## 2022-11-16 PROBLEM — N39.0 RECURRENT UTI: Status: ACTIVE | Noted: 2021-05-27

## 2022-11-18 LAB
ANION GAP SERPL CALC-SCNC: 14 MMOL/L
BUN SERPL-MCNC: 12 MG/DL
CALCIUM SERPL-MCNC: 10.1 MG/DL
CHLORIDE SERPL-SCNC: 100 MMOL/L
CO2 SERPL-SCNC: 23 MMOL/L
CREAT SERPL-MCNC: 1.06 MG/DL
EGFR: 68 ML/MIN/1.73M2
GLUCOSE SERPL-MCNC: 92 MG/DL
POTASSIUM SERPL-SCNC: 4.9 MMOL/L
SODIUM SERPL-SCNC: 136 MMOL/L

## 2022-11-22 ENCOUNTER — NON-APPOINTMENT (OUTPATIENT)
Age: 42
End: 2022-11-22

## 2022-11-23 ENCOUNTER — APPOINTMENT (OUTPATIENT)
Dept: INTERNAL MEDICINE | Facility: CLINIC | Age: 42
End: 2022-11-23

## 2022-11-29 ENCOUNTER — APPOINTMENT (OUTPATIENT)
Dept: INTERNAL MEDICINE | Facility: CLINIC | Age: 42
End: 2022-11-29

## 2022-11-29 PROCEDURE — 99213 OFFICE O/P EST LOW 20 MIN: CPT | Mod: GC,95

## 2022-11-30 NOTE — END OF VISIT
[FreeTextEntry3] : I was present with the Resident during the key portions of this encounter and provided supervision via audio/visual technology.  I agree with the findings and plan as documented in the Resident's note, unless noted below. \par \par Post-op DVT: Eliquis renewed\par Gabapentin renewed.

## 2022-11-30 NOTE — ASSESSMENT
[FreeTextEntry1] : \par 41 y/o female with recent right periacetabular osteotomy on 10/21/22 which was complicated by DVT. Presenting for Eliquis refill today. \par \par #LE DVT\par S/p right hip surgery ~1 month ago. Patient finished one month Eliquis starter pack today for DVT. She requires 2 more months of medication. Also requesting gabapentin refill. \par - Continue Eliquis 5mg BID x 2 months (prescription provided)\par - F/u with ortho\par - RTC as needed\par \par #Neuropathic pain\par - Gabapentin 200mg in AM and afternoon (prescription refilled)\par - Gabapentin 400mg in evening \par

## 2022-11-30 NOTE — REVIEW OF SYSTEMS
[Fever] : no fever [Chills] : no chills [Fatigue] : no fatigue [Chest Pain] : no chest pain [Palpitations] : no palpitations [Lower Ext Edema] : no lower extremity edema [Shortness Of Breath] : no shortness of breath [Cough] : no cough [Diarrhea] : diarrhea [Melena] : no melena [Dysuria] : no dysuria [Hematuria] : no hematuria [Joint Swelling] : no joint swelling [Headache] : no headache [Dizziness] : no dizziness [Fainting] : no fainting [FreeTextEntry9] : +right hip pain

## 2022-11-30 NOTE — HISTORY OF PRESENT ILLNESS
[Home] : at home, [unfilled] , at the time of the visit. [Medical Office: ([facility])___] : at the medical office located in  [Verbal consent obtained from patient] : the patient, [unfilled] [FreeTextEntry1] : medication refill [de-identified] : 41 y/o F w/ a PMHx of endometriosis, Sjogren's syndrome, migraine HAs, right nephrectomy 2012, restless leg syndrome, and recent right periacetabular osteotomy on 10/21/22 which was c/b DVT presenting for medication refills. Patient finished one month Eliquis starter pack today for DVT. She requires 2 more months of medication. Also requesting gabapentin refill. \par \par Otherwise no complaints today. Currently improving and was advanced to crutches recently after being able to bear weight on right leg.

## 2022-12-06 ENCOUNTER — NON-APPOINTMENT (OUTPATIENT)
Age: 42
End: 2022-12-06

## 2022-12-06 ENCOUNTER — RX RENEWAL (OUTPATIENT)
Age: 42
End: 2022-12-06

## 2022-12-12 ENCOUNTER — RX RENEWAL (OUTPATIENT)
Age: 42
End: 2022-12-12

## 2022-12-15 ENCOUNTER — APPOINTMENT (OUTPATIENT)
Age: 42
End: 2022-12-15

## 2022-12-19 ENCOUNTER — TRANSCRIPTION ENCOUNTER (OUTPATIENT)
Age: 42
End: 2022-12-19

## 2023-01-03 ENCOUNTER — TRANSCRIPTION ENCOUNTER (OUTPATIENT)
Age: 43
End: 2023-01-03

## 2023-01-06 ENCOUNTER — OUTPATIENT (OUTPATIENT)
Dept: OUTPATIENT SERVICES | Facility: HOSPITAL | Age: 43
LOS: 1 days | End: 2023-01-06
Payer: COMMERCIAL

## 2023-01-06 DIAGNOSIS — Z90.49 ACQUIRED ABSENCE OF OTHER SPECIFIED PARTS OF DIGESTIVE TRACT: Chronic | ICD-10-CM

## 2023-01-06 DIAGNOSIS — Z98.89 OTHER SPECIFIED POSTPROCEDURAL STATES: Chronic | ICD-10-CM

## 2023-01-06 DIAGNOSIS — K08.409 PARTIAL LOSS OF TEETH, UNSPECIFIED CAUSE, UNSPECIFIED CLASS: Chronic | ICD-10-CM

## 2023-01-06 DIAGNOSIS — Q63.9 CONGENITAL MALFORMATION OF KIDNEY, UNSPECIFIED: Chronic | ICD-10-CM

## 2023-01-06 DIAGNOSIS — Z90.5 ACQUIRED ABSENCE OF KIDNEY: Chronic | ICD-10-CM

## 2023-01-06 DIAGNOSIS — Z98.890 OTHER SPECIFIED POSTPROCEDURAL STATES: Chronic | ICD-10-CM

## 2023-01-06 PROCEDURE — 73501 X-RAY EXAM HIP UNI 1 VIEW: CPT | Mod: 26,RT

## 2023-01-06 PROCEDURE — 73501 X-RAY EXAM HIP UNI 1 VIEW: CPT

## 2023-02-09 ENCOUNTER — APPOINTMENT (OUTPATIENT)
Dept: NEUROLOGY | Facility: CLINIC | Age: 43
End: 2023-02-09
Payer: MEDICAID

## 2023-02-09 VITALS
BODY MASS INDEX: 21.98 KG/M2 | WEIGHT: 145 LBS | SYSTOLIC BLOOD PRESSURE: 101 MMHG | HEIGHT: 68 IN | DIASTOLIC BLOOD PRESSURE: 66 MMHG | HEART RATE: 70 BPM | OXYGEN SATURATION: 100 %

## 2023-02-09 VITALS — SYSTOLIC BLOOD PRESSURE: 105 MMHG | DIASTOLIC BLOOD PRESSURE: 68 MMHG

## 2023-02-09 PROCEDURE — 99205 OFFICE O/P NEW HI 60 MIN: CPT

## 2023-02-13 NOTE — PHYSICAL EXAM
[FreeTextEntry1] : General: this is a pleasant patient w/ mild HA today\par \par HEENT conjunctiva are normal, oropharynx is clear, no tenderness in head\par \par CV: normal pulses, regular rate and rhythm, no peripheral edema noted\par \par Lungs: breathing is non-labored\par \par abd: soft and non-distended\par \par Mental status: Alert and oriented to person, place and time\par Language is normal \par \par Cranial Nerves:\par extra-occular movements in tact without nystagmus, normal saccades and smooth pursuit, visual fields full to confrontation, pupils equal, round and reactive to light. Face symmetric and facial strength symmetric, facial sensation symmetric, hearing present bilaterally to finger rub, tongue and uvula midline. neck flexion and extension normal strength.\par \par Motor: normal bulk and tone throughout. no abnormal movements.  Full 5/5 strength uppers and lower extremities proximally and distally\par \par Sensory: in tact and symmetric to vibration, light tough, pin prick, temperature\par \par Cerebellar: normal finger-nose-finger bilaterally\par \par Reflexes: 2+ in the upper and lower extremities and symmetric.  toes are bilaterally downgoing.\par \par Gait: stable, able to tip toe heel and tandem\par \par Stances: narrow\par Romberg: neg\par Shapened romberg:\par \par Orthostatic Vitals:\par Supine: 105/68mmHg, 58bpm\par sittin/77mmHg, 64bpm\par STandin/74mmHg, 73bpm\par \par

## 2023-02-13 NOTE — REASON FOR VISIT
[Consultation] : a consultation visit [FreeTextEntry1] : To established care as her Neurologist left the prior practice

## 2023-02-13 NOTE — REVIEW OF SYSTEMS
[Numbness] : numbness [Tingling] : tingling [Dizziness] : dizziness [Migraine Headache] : migraine headaches [Sleep Disturbances] : sleep disturbances [Anxiety] : anxiety [Depression] : depression [Palpitations] : palpitations [Joint Pain] : joint pain [Negative] : Genitourinary [Arthralgias] : no arthralgias

## 2023-02-13 NOTE — HISTORY OF PRESENT ILLNESS
[FreeTextEntry1] : CARLOS MACK is a 42 year who presents for care of Migraine. Prior Neurologist was Dr. Krish Davidson.\par \par She reported Migraine Onset since teenager. They are unilateral. usually woke up w/ FLORES and sometimes HA are low grade in posterior neck or back of the eye w/ 1-2/10 intensity. they would last a day to 2-3 days or can be continuos which would require prednisone dose pack. She expressed blackening of her vision preceding her HA and experienced associated symptoms of N/V, dizziness, blurry vision, sensitivity to odor, sound and light. She stated that she has 2-3 HA free days per month and would use her abortive mediation 5x per month. HA are Exacerbation: premenstrual, light, strong odors, sound and some improvement w/ Naratriptan/Excedrin combination. She is not able to try NSAIDs due to 1 kidney and current preventive w/ subtherapeutic Propranolol 20mg QD. She also takes Duloxetine and Neurontin for anxiety and neuropathy respectfully. Triggers: alcohol, marijuana, hunger, loud/high pitch sounds, bright/flashing light; odors  \par \par Denies diplopia, dysphagia, dysarthria, aphasia, focal weakness or numbness, bowel or bladder dysfunction, imbalance, falls, vertigo, hearing changes \par \par Current migraine therapy: Propranolol 20mg AM - when taken at night gets RLS, Naratriptan 2.5mg, Excedrin, Tylenol \par Migraine Meds tried: Steroid pack (1-2/yr) for status migraine. Sumatriptan, Botox (face paralysis after TMJ injection 8yrs ago)\par Aura: scotoma \par Sleep: 6-7 hrs per night. insomnia - takes valerian \par Avoid NSAIDs due to 1 kidney\par \par \par PMHx: Endometriosis, Sjrogen's w/ Neuropathy. RLS, TMJ\par PSHx: b/l shoulder surgery; hip repair 10/2022, Right Nephrectomy,  Colon resection, Appendectomy.  \par Meds: Duloxetine 30mg AM; Neurontin 400mg BID; Hydroxychloroquine \par ALL: NKDA\par SHx: Artist/. Non-smoker, casual drinker, illicit drug use \par \par \par

## 2023-03-01 ENCOUNTER — NON-APPOINTMENT (OUTPATIENT)
Age: 43
End: 2023-03-01

## 2023-03-01 ENCOUNTER — APPOINTMENT (OUTPATIENT)
Dept: HEART AND VASCULAR | Facility: CLINIC | Age: 43
End: 2023-03-01
Payer: MEDICAID

## 2023-03-01 VITALS
TEMPERATURE: 97.6 F | HEIGHT: 68 IN | SYSTOLIC BLOOD PRESSURE: 118 MMHG | OXYGEN SATURATION: 99 % | BODY MASS INDEX: 22.58 KG/M2 | HEART RATE: 72 BPM | DIASTOLIC BLOOD PRESSURE: 72 MMHG | WEIGHT: 148.99 LBS

## 2023-03-01 DIAGNOSIS — I95.9 HYPOTENSION, UNSPECIFIED: ICD-10-CM

## 2023-03-01 DIAGNOSIS — I95.1 ORTHOSTATIC HYPOTENSION: ICD-10-CM

## 2023-03-01 PROCEDURE — 93000 ELECTROCARDIOGRAM COMPLETE: CPT

## 2023-03-01 PROCEDURE — 99203 OFFICE O/P NEW LOW 30 MIN: CPT

## 2023-03-01 PROCEDURE — 36415 COLL VENOUS BLD VENIPUNCTURE: CPT

## 2023-03-01 RX ORDER — METHYLPREDNISOLONE 4 MG/1
4 TABLET ORAL
Qty: 1 | Refills: 0 | Status: DISCONTINUED | COMMUNITY
Start: 2022-08-11 | End: 2023-03-01

## 2023-03-01 RX ORDER — CYCLOBENZAPRINE HYDROCHLORIDE 5 MG/1
5 TABLET, FILM COATED ORAL 3 TIMES DAILY
Qty: 90 | Refills: 1 | Status: DISCONTINUED | COMMUNITY
Start: 2022-07-14 | End: 2023-03-01

## 2023-03-01 RX ORDER — GABAPENTIN 100 MG/1
100 CAPSULE ORAL
Qty: 120 | Refills: 3 | Status: DISCONTINUED | COMMUNITY
Start: 2021-05-27 | End: 2023-03-01

## 2023-03-01 RX ORDER — LEVOCETIRIZINE DIHYDROCHLORIDE 5 MG/1
5 TABLET ORAL DAILY
Qty: 90 | Refills: 0 | Status: DISCONTINUED | COMMUNITY
Start: 2018-10-03 | End: 2023-03-01

## 2023-03-01 RX ORDER — APIXABAN 5 MG/1
5 TABLET, FILM COATED ORAL TWICE DAILY
Qty: 60 | Refills: 2 | Status: DISCONTINUED | COMMUNITY
Start: 2022-11-29 | End: 2023-03-01

## 2023-03-01 RX ORDER — NITROFURANTOIN (MONOHYDRATE/MACROCRYSTALS) 25; 75 MG/1; MG/1
100 CAPSULE ORAL
Qty: 6 | Refills: 0 | Status: DISCONTINUED | COMMUNITY
Start: 2022-11-01 | End: 2023-03-01

## 2023-03-02 LAB
ANION GAP SERPL CALC-SCNC: 14 MMOL/L
BUN SERPL-MCNC: 14 MG/DL
CALCIUM SERPL-MCNC: 9.5 MG/DL
CHLORIDE SERPL-SCNC: 101 MMOL/L
CHOLEST SERPL-MCNC: 190 MG/DL
CO2 SERPL-SCNC: 23 MMOL/L
CREAT SERPL-MCNC: 1.04 MG/DL
EGFR: 70 ML/MIN/1.73M2
ESTIMATED AVERAGE GLUCOSE: 108 MG/DL
GLUCOSE SERPL-MCNC: 83 MG/DL
HBA1C MFR BLD HPLC: 5.4 %
HDLC SERPL-MCNC: 119 MG/DL
LDLC SERPL CALC-MCNC: 65 MG/DL
NONHDLC SERPL-MCNC: 71 MG/DL
POTASSIUM SERPL-SCNC: 4.9 MMOL/L
SODIUM SERPL-SCNC: 138 MMOL/L
TRIGL SERPL-MCNC: 32 MG/DL

## 2023-03-02 NOTE — HISTORY OF PRESENT ILLNESS
[FreeTextEntry1] : 42 F SJogrens/ Neuropathy  Endometriosis Migraines CHEYANNE of the hip complicated by DVT Hypermobility \par \par referred by primary care for evaluation of POTS. She has low blood pressure she is always lightheaded and dizzy all the time. she feels it most when she stands up she feels very tired and weak. She has been taking in more salt in her diet drinks a lot of water. much worse in the morning. She takes propranolol for migraine. Low  prevention. She is markedly orthostatic. \par \par \par \par ecg nsr 3/1/2023

## 2023-03-02 NOTE — ASSESSMENT
[FreeTextEntry1] : - she does not meet criteria for POTS\par - looking at previous vitals she may orthostatic hypotension\par - she is not orthostatic today \par - trial of fludrocortisone 0.1 mg daily will get baseline NA\par - lifestyle discussed reassurance provided\par - fu in one month if she does not feel better will increase fludrocortisone

## 2023-03-03 LAB
T4 FREE SERPL-MCNC: 1 NG/DL
TSH SERPL-ACNC: 1.88 UIU/ML

## 2023-03-08 ENCOUNTER — APPOINTMENT (OUTPATIENT)
Age: 43
End: 2023-03-08
Payer: MEDICAID

## 2023-03-08 VITALS
HEART RATE: 73 BPM | WEIGHT: 149 LBS | DIASTOLIC BLOOD PRESSURE: 70 MMHG | BODY MASS INDEX: 22.58 KG/M2 | OXYGEN SATURATION: 99 % | HEIGHT: 68 IN | TEMPERATURE: 96.9 F | SYSTOLIC BLOOD PRESSURE: 114 MMHG

## 2023-03-08 PROCEDURE — 99213 OFFICE O/P EST LOW 20 MIN: CPT

## 2023-03-08 NOTE — CONSULT LETTER
[Dear  ___] : Dear  [unfilled], [Consult Letter:] : I had the pleasure of evaluating your patient, [unfilled]. [Please see my note below.] : Please see my note below. [Consult Closing:] : Thank you very much for allowing me to participate in the care of this patient.  If you have any questions, please do not hesitate to contact me. [Sincerely,] : Sincerely, [FreeTextEntry3] : Som Polanco MD

## 2023-03-08 NOTE — PHYSICAL EXAM
[FreeTextEntry1] : General: this is a pleasant patient w/ mild HA today\par \par HEENT conjunctiva are normal, oropharynx is clear, no tenderness in head\par \par CV: normal pulses, regular rate and rhythm, no peripheral edema noted\par \par Lungs: breathing is non-labored\par \par abd: soft and non-distended\par \par Mental status: Alert and oriented to person, place and time\par Language is normal \par \par Cranial Nerves:\par extra-occular movements in tact without nystagmus, normal saccades and smooth pursuit, visual fields full to confrontation, pupils equal, round and reactive to light. Face symmetric and facial strength symmetric, facial sensation symmetric, hearing present bilaterally to finger rub, tongue and uvula midline. neck flexion and extension normal strength.\par \par Motor: normal bulk and tone throughout. no abnormal movements. Full 5/5 strength uppers and lower extremities proximally and distally\par \par Sensory: in tact and symmetric to vibration, light tough, pin prick, temperature\par \par Cerebellar: normal finger-nose-finger bilaterally\par \par Reflexes: 2+ in the upper and lower extremities and symmetric. toes are bilaterally downgoing.\par \par Gait: stable, able to tip toe heel and tandem\par \par Stances: narrow\par Romberg: neg

## 2023-03-08 NOTE — HISTORY OF PRESENT ILLNESS
[FreeTextEntry1] : CARLOS MACK is a 42 year who returns to follow up for CM and dysautonomia\par \par last visit was 2/9/2023, I gave Emgality loading dose and prescribed it monthly. I also suggested leaving same dosing of Cymbalta and gabapentin\par \par we also discussed autonomic issues which were not clearly delineated\par \par since last visit emgality was amazing, only 2 headaches in last month.\par \par autonomic issues somewhat better also. bending over more easily.\par \par \par

## 2023-03-20 ENCOUNTER — APPOINTMENT (OUTPATIENT)
Age: 43
End: 2023-03-20
Payer: MEDICAID

## 2023-03-20 VITALS — DIASTOLIC BLOOD PRESSURE: 68 MMHG | SYSTOLIC BLOOD PRESSURE: 105 MMHG | HEART RATE: 73 BPM | OXYGEN SATURATION: 98 %

## 2023-03-20 VITALS
HEART RATE: 73 BPM | DIASTOLIC BLOOD PRESSURE: 65 MMHG | HEIGHT: 68 IN | OXYGEN SATURATION: 98 % | SYSTOLIC BLOOD PRESSURE: 103 MMHG | BODY MASS INDEX: 21.98 KG/M2 | WEIGHT: 145 LBS | TEMPERATURE: 97.1 F

## 2023-03-20 PROCEDURE — 99214 OFFICE O/P EST MOD 30 MIN: CPT

## 2023-03-20 NOTE — HISTORY OF PRESENT ILLNESS
[FreeTextEntry1] : CARLOS MACK is a 42 year who returns to follow up for migraine\par \par last visit was 3/8/2023\par \par since last visit she did the 120mg injection of emgality on 3/8/2023 and it did not have nearly the impact of 240mg.  back to daily headaches.  \par \par feeling more lightheaded recently also and having tons of brain zaps in last few days.\par \par \par \par \par

## 2023-03-20 NOTE — PHYSICAL EXAM
[FreeTextEntry1] : orthostatics negative today\par \par General: this is a pleasant patient w/ mild HA today\par \par HEENT conjunctiva are normal, oropharynx is clear, no tenderness in head\par \par CV: normal pulses, regular rate and rhythm, no peripheral edema noted\par \par Lungs: breathing is non-labored\par \par abd: soft and non-distended\par \par Mental status: Alert and oriented to person, place and time\par Language is normal \par \par Cranial Nerves:\par extra-occular movements in tact without nystagmus, normal saccades and smooth pursuit, visual fields full to confrontation, pupils equal, round and reactive to light. Face symmetric and facial strength symmetric, facial sensation symmetric, hearing present bilaterally to finger rub, tongue and uvula midline. neck flexion and extension normal strength.\par \par Motor: normal bulk and tone throughout. no abnormal movements. Full 5/5 strength uppers and lower extremities proximally and distally\par \par Sensory: in tact and symmetric to vibration, light tough, pin prick, temperature\par \par Cerebellar: normal finger-nose-finger bilaterally\par \par Reflexes: 2+ in the upper and lower extremities and symmetric. toes are bilaterally downgoing.\par \par Gait: stable, able to tip toe heel and tandem\par \par Stances: narrow\par Romberg: neg

## 2023-04-07 ENCOUNTER — OUTPATIENT (OUTPATIENT)
Dept: OUTPATIENT SERVICES | Facility: HOSPITAL | Age: 43
LOS: 1 days | End: 2023-04-07
Payer: COMMERCIAL

## 2023-04-07 DIAGNOSIS — Z98.89 OTHER SPECIFIED POSTPROCEDURAL STATES: Chronic | ICD-10-CM

## 2023-04-07 DIAGNOSIS — K08.409 PARTIAL LOSS OF TEETH, UNSPECIFIED CAUSE, UNSPECIFIED CLASS: Chronic | ICD-10-CM

## 2023-04-07 DIAGNOSIS — Z98.890 OTHER SPECIFIED POSTPROCEDURAL STATES: Chronic | ICD-10-CM

## 2023-04-07 DIAGNOSIS — Z90.49 ACQUIRED ABSENCE OF OTHER SPECIFIED PARTS OF DIGESTIVE TRACT: Chronic | ICD-10-CM

## 2023-04-07 DIAGNOSIS — Q63.9 CONGENITAL MALFORMATION OF KIDNEY, UNSPECIFIED: Chronic | ICD-10-CM

## 2023-04-07 DIAGNOSIS — Z90.5 ACQUIRED ABSENCE OF KIDNEY: Chronic | ICD-10-CM

## 2023-04-07 PROCEDURE — 73502 X-RAY EXAM HIP UNI 2-3 VIEWS: CPT

## 2023-04-07 PROCEDURE — 73502 X-RAY EXAM HIP UNI 2-3 VIEWS: CPT | Mod: 26,RT

## 2023-04-10 ENCOUNTER — APPOINTMENT (OUTPATIENT)
Age: 43
End: 2023-04-10
Payer: MEDICAID

## 2023-04-10 PROCEDURE — 99213 OFFICE O/P EST LOW 20 MIN: CPT

## 2023-04-10 NOTE — HISTORY OF PRESENT ILLNESS
[FreeTextEntry1] : CARLOS MACK is a 42 year who returns to follow up for CM\par \par last visit was 3/20/2022\par \par since last visit only had 120mg refilled.  the extra 120mg I gave last month helped a lot. able to function better at work, have better social and family interactions\par \par \par

## 2023-04-28 ENCOUNTER — APPOINTMENT (OUTPATIENT)
Dept: HEART AND VASCULAR | Facility: CLINIC | Age: 43
End: 2023-04-28

## 2023-05-04 ENCOUNTER — TRANSCRIPTION ENCOUNTER (OUTPATIENT)
Age: 43
End: 2023-05-04

## 2023-05-10 ENCOUNTER — APPOINTMENT (OUTPATIENT)
Age: 43
End: 2023-05-10
Payer: MEDICAID

## 2023-05-10 PROCEDURE — 99214 OFFICE O/P EST MOD 30 MIN: CPT

## 2023-05-10 NOTE — PROCEDURE
[FreeTextEntry1] : 120mg emgality injected SQ to each deltoid without complications\par lot [id number]\par exp [id number]

## 2023-05-10 NOTE — CONSULT LETTER
[Dear  ___] : Dear  [unfilled], [Courtesy Letter:] : I had the pleasure of seeing your patient, [unfilled], in my office today. [Please see my note below.] : Please see my note below. [Consult Closing:] : Thank you very much for allowing me to participate in the care of this patient.  If you have any questions, please do not hesitate to contact me. [Sincerely,] : Sincerely, [FreeTextEntry3] : Som Polanco MD

## 2023-05-10 NOTE — HISTORY OF PRESENT ILLNESS
[FreeTextEntry1] : CARLOS MACK is a 43 year who returns to follow up for CM\par \par last visit was 4/10/2023 when I gave extra 120mg sample as 240mg was much more effective than 120mg\par \par since last visit did have an injection site reaction day after on R thigh.  took benadryl and resovled\par \par with 240mg has been back to having zero headaches.  no constipation very happy with response. last few days starting to get some increased symptoms and took naratriptan twice this week\par \par \par

## 2023-05-30 ENCOUNTER — TRANSCRIPTION ENCOUNTER (OUTPATIENT)
Age: 43
End: 2023-05-30

## 2023-06-06 ENCOUNTER — TRANSCRIPTION ENCOUNTER (OUTPATIENT)
Age: 43
End: 2023-06-06

## 2023-06-08 RX ORDER — GABAPENTIN 400 MG/1
400 CAPSULE ORAL
Qty: 60 | Refills: 0 | Status: ACTIVE | COMMUNITY
Start: 2022-11-15 | End: 1900-01-01

## 2023-06-09 ENCOUNTER — APPOINTMENT (OUTPATIENT)
Age: 43
End: 2023-06-09

## 2023-06-12 ENCOUNTER — APPOINTMENT (OUTPATIENT)
Dept: OBGYN | Facility: CLINIC | Age: 43
End: 2023-06-12

## 2023-06-26 ENCOUNTER — TRANSCRIPTION ENCOUNTER (OUTPATIENT)
Age: 43
End: 2023-06-26

## 2023-06-26 ENCOUNTER — APPOINTMENT (OUTPATIENT)
Dept: RHEUMATOLOGY | Facility: CLINIC | Age: 43
End: 2023-06-26
Payer: MEDICAID

## 2023-06-26 VITALS
WEIGHT: 152 LBS | OXYGEN SATURATION: 99 % | TEMPERATURE: 98.8 F | SYSTOLIC BLOOD PRESSURE: 102 MMHG | HEIGHT: 68 IN | DIASTOLIC BLOOD PRESSURE: 64 MMHG | HEART RATE: 78 BPM | BODY MASS INDEX: 23.04 KG/M2

## 2023-06-26 DIAGNOSIS — R53.83 OTHER FATIGUE: ICD-10-CM

## 2023-06-26 DIAGNOSIS — M24.80 OTHER SPECIFIC JOINT DERANGEMENTS OF UNSPECIFIED JOINT, NOT ELSEWHERE CLASSIFIED: ICD-10-CM

## 2023-06-26 PROCEDURE — 99214 OFFICE O/P EST MOD 30 MIN: CPT | Mod: 25

## 2023-06-26 RX ORDER — DICLOFENAC SODIUM 1 %
1 KIT TOPICAL
Qty: 1 | Refills: 3 | Status: DISCONTINUED | COMMUNITY
Start: 2021-01-21 | End: 2023-06-26

## 2023-06-26 RX ORDER — FLUDROCORTISONE ACETATE 0.1 MG/1
0.1 TABLET ORAL
Qty: 30 | Refills: 5 | Status: DISCONTINUED | COMMUNITY
Start: 2023-03-01 | End: 2023-06-26

## 2023-06-26 RX ORDER — HYDROCORTISONE 25 MG/G
2.5 CREAM TOPICAL TWICE DAILY
Qty: 1 | Refills: 0 | Status: DISCONTINUED | COMMUNITY
Start: 2022-10-20 | End: 2023-06-26

## 2023-06-26 NOTE — HISTORY OF PRESENT ILLNESS
[FreeTextEntry1] : June 26, 2023\par Patient returns for follow up\par Previously evaluated by Dr. Baker and Dr. Hall, last visit May 23, 2022\par Dry eyes better, minimal, able to wear contacts\par Sees the dentist regularly, no increase in cavities \par No dry mouth\par Started emgality, feels much improvement in starting medications, for about five months\par Continues gabapentin 400 bid, increased following hip surgery last 10/2022, [facility], possible EDS\par Feels an increase in sensitivity in hands and feet, gabapentin doesn’t always work\par Restless legs feels worse, will discuss with her neurologist\par No history of dislocated joints \par Bilateral labral repair shoulders\par Hip Labrum torn as well\par Not physical job, no high impact activities\par No tobacco use\par Had right lower extremity dvt following hip surgery, treated with eliquis for three months\par Had superficial thrombophlebitis following shoulder surgery, not requiring anticoagulation\par Will see opthalmology given long term plaquenil use\par Chart reviewed, labs reviewed, imaging reviewed\par \par Previous Notes: Dr. Lei\par Follow up: 5/23/22 \par 41 y/o with Sjogren's (dry eyes, MAX 1:640. SSa/SSb both strongly positive), lost follow up, last time seen one year ago. \par MTX stopped due to concerns of recurrent UTI\par Stable Sjogrens,mild sicca symptoms. No skin rash, joint swelling, LAD, fever, chills or pulm symptoms. \par She has hypermobility, hip dysplasia and labral tear for which follow with ortho and recommended surgical intervention. \par Reviewed recent labs from 03/22 and ESR/CRP WNL as well as stable CBC, chem.\par \par Meds: \par Plaquenil 200mg BID (eye exam Jan,2022 told no maculopathy)

## 2023-06-26 NOTE — ASSESSMENT
[FreeTextEntry1] : 43-year-old woman returns for follow-up.  Patient with history of Sjogren's syndrome with strongly positive MAX, SSA, and SSB, doing well on hydroxychloroquine 200 mg twice daily.  Patient following closely with neurologist, reports great improvement in migraines since starting Emgality about 4 to 5 months ago.  Patient will discuss restless leg syndrome symptoms with neurologist as well.  Discussed ophthalmology follow-up given hydroxychloroquine use, patient will make appointment.  No dry mouth symptoms at this time, follows with dentist regularly, no history of cavities.  Patient status post hip surgery in October 2022, continues gabapentin for neuropathy symptoms, now taking 400 mg twice daily.  Will update labs today in office, will check CBC, CMP, ESR, CRP, rheumatoid factor, CCP, as well as B12 and vitamin D.  We will follow-up in 6 months or sooner as needed.

## 2023-06-26 NOTE — PHYSICAL EXAM
[General Appearance - Alert] : alert [General Appearance - In No Acute Distress] : in no acute distress [General Appearance - Well-Appearing] : healthy appearing [Sclera] : the sclera and conjunctiva were normal [Examination Of The Oral Cavity] : the lips and gums were normal [Oropharynx] : the oropharynx was normal [Respiration, Rhythm And Depth] : normal respiratory rhythm and effort [Edema] : there was no peripheral edema [No Spinal Tenderness] : no spinal tenderness [Abnormal Walk] : normal gait [Motor Tone] : muscle strength and tone were normal [] : no rash [Oriented To Time, Place, And Person] : oriented to person, place, and time [Impaired Insight] : insight and judgment were intact [Affect] : the affect was normal [Mood] : the mood was normal [FreeTextEntry1] : Left thumb to forearm positive, right thumb to forearm negative, negative hypermobility of the fifth fingers bilaterally.  Hyperextension of the knees bilaterally positive, able to touch palms to floor with lumbar flexion.  No active synovitis noted.

## 2023-06-27 LAB
25(OH)D3 SERPL-MCNC: 46.8 NG/ML
ALBUMIN SERPL ELPH-MCNC: 4.2 G/DL
ALP BLD-CCNC: 47 U/L
ALT SERPL-CCNC: 14 U/L
ANION GAP SERPL CALC-SCNC: 12 MMOL/L
AST SERPL-CCNC: 24 U/L
BILIRUB SERPL-MCNC: 0.5 MG/DL
BUN SERPL-MCNC: 12 MG/DL
CALCIUM SERPL-MCNC: 9.3 MG/DL
CHLORIDE SERPL-SCNC: 104 MMOL/L
CO2 SERPL-SCNC: 25 MMOL/L
CREAT SERPL-MCNC: 1.16 MG/DL
CRP SERPL-MCNC: <3 MG/L
EGFR: 61 ML/MIN/1.73M2
ERYTHROCYTE [SEDIMENTATION RATE] IN BLOOD BY WESTERGREN METHOD: 4 MM/HR
GLUCOSE SERPL-MCNC: 79 MG/DL
POTASSIUM SERPL-SCNC: 4.9 MMOL/L
PROT SERPL-MCNC: 6.9 G/DL
RHEUMATOID FACT SER QL: 12 IU/ML
SODIUM SERPL-SCNC: 141 MMOL/L
VIT B12 SERPL-MCNC: 478 PG/ML

## 2023-06-28 LAB
CCP AB SER IA-ACNC: <8 UNITS
RF+CCP IGG SER-IMP: NEGATIVE

## 2023-07-06 ENCOUNTER — APPOINTMENT (OUTPATIENT)
Dept: NEUROLOGY | Facility: CLINIC | Age: 43
End: 2023-07-06
Payer: MEDICAID

## 2023-07-06 VITALS
WEIGHT: 148.56 LBS | HEART RATE: 82 BPM | OXYGEN SATURATION: 98 % | BODY MASS INDEX: 22.52 KG/M2 | HEIGHT: 68 IN | DIASTOLIC BLOOD PRESSURE: 73 MMHG | SYSTOLIC BLOOD PRESSURE: 109 MMHG | TEMPERATURE: 97.8 F

## 2023-07-06 DIAGNOSIS — G62.9 POLYNEUROPATHY, UNSPECIFIED: ICD-10-CM

## 2023-07-06 DIAGNOSIS — Z86.69 PERSONAL HISTORY OF OTHER DISEASES OF THE NERVOUS SYSTEM AND SENSE ORGANS: ICD-10-CM

## 2023-07-06 DIAGNOSIS — G43.909 MIGRAINE, UNSPECIFIED, NOT INTRACTABLE, W/OUT STATUS MIGRAINOSUS: ICD-10-CM

## 2023-07-06 PROCEDURE — 99214 OFFICE O/P EST MOD 30 MIN: CPT

## 2023-07-06 NOTE — PROCEDURE
[FreeTextEntry1] : sample of emgality 120mg injected to R deltoid without complications lot [id number] exp 12/12/2024

## 2023-07-06 NOTE — HISTORY OF PRESENT ILLNESS
[FreeTextEntry1] : CARLOS MACK is a 43 year who returns to follow up for chronic migraine\par \par last visit was 5/10/2023\par \par since last visit her rheumatologist suggested she discuss new leg symptoms with me.  she does have burning in hands and feet.  has gotten worse since last surgery.  also has leg restlessness that has been treated with gabapentin 400mg BID.\par \par she injected emgality incorrectly this month and comes for a sample to replace the missing injection\par \par \par

## 2023-07-31 ENCOUNTER — TRANSCRIPTION ENCOUNTER (OUTPATIENT)
Age: 43
End: 2023-07-31

## 2023-09-14 ENCOUNTER — TRANSCRIPTION ENCOUNTER (OUTPATIENT)
Age: 43
End: 2023-09-14

## 2023-10-03 ENCOUNTER — TRANSCRIPTION ENCOUNTER (OUTPATIENT)
Age: 43
End: 2023-10-03

## 2023-10-25 ENCOUNTER — APPOINTMENT (OUTPATIENT)
Dept: COLORECTAL SURGERY | Facility: CLINIC | Age: 43
End: 2023-10-25
Payer: MEDICAID

## 2023-10-25 VITALS
OXYGEN SATURATION: 99 % | DIASTOLIC BLOOD PRESSURE: 78 MMHG | BODY MASS INDEX: 22.73 KG/M2 | RESPIRATION RATE: 14 BRPM | SYSTOLIC BLOOD PRESSURE: 135 MMHG | HEIGHT: 68 IN | HEART RATE: 85 BPM | WEIGHT: 150 LBS

## 2023-10-25 DIAGNOSIS — R19.7 DIARRHEA, UNSPECIFIED: ICD-10-CM

## 2023-10-25 PROCEDURE — 46600 DIAGNOSTIC ANOSCOPY SPX: CPT

## 2023-10-25 PROCEDURE — 99203 OFFICE O/P NEW LOW 30 MIN: CPT | Mod: 25

## 2023-11-01 ENCOUNTER — TRANSCRIPTION ENCOUNTER (OUTPATIENT)
Age: 43
End: 2023-11-01

## 2023-11-07 ENCOUNTER — TRANSCRIPTION ENCOUNTER (OUTPATIENT)
Age: 43
End: 2023-11-07

## 2023-11-07 ENCOUNTER — APPOINTMENT (OUTPATIENT)
Dept: INTERNAL MEDICINE | Facility: CLINIC | Age: 43
End: 2023-11-07
Payer: MEDICAID

## 2023-11-07 VITALS
HEART RATE: 75 BPM | DIASTOLIC BLOOD PRESSURE: 77 MMHG | OXYGEN SATURATION: 99 % | TEMPERATURE: 98.2 F | SYSTOLIC BLOOD PRESSURE: 113 MMHG | RESPIRATION RATE: 14 BRPM | WEIGHT: 152 LBS | BODY MASS INDEX: 23.11 KG/M2

## 2023-11-07 PROCEDURE — 99214 OFFICE O/P EST MOD 30 MIN: CPT

## 2023-11-07 RX ORDER — PROPRANOLOL HYDROCHLORIDE 10 MG/1
10 TABLET ORAL
Qty: 360 | Refills: 0 | Status: DISCONTINUED | COMMUNITY
Start: 2020-05-27 | End: 2023-11-07

## 2023-11-07 RX ORDER — BUSPIRONE HYDROCHLORIDE 5 MG/1
5 TABLET ORAL
Qty: 60 | Refills: 2 | Status: DISCONTINUED | COMMUNITY
Start: 2021-07-14 | End: 2023-11-07

## 2023-11-07 RX ORDER — GABAPENTIN 600 MG/1
600 TABLET, COATED ORAL TWICE DAILY
Qty: 180 | Refills: 3 | Status: DISCONTINUED | COMMUNITY
Start: 2023-07-06 | End: 2023-11-07

## 2023-11-29 ENCOUNTER — APPOINTMENT (OUTPATIENT)
Dept: COLORECTAL SURGERY | Facility: CLINIC | Age: 43
End: 2023-11-29

## 2023-12-01 ENCOUNTER — OUTPATIENT (OUTPATIENT)
Dept: OUTPATIENT SERVICES | Facility: HOSPITAL | Age: 43
LOS: 1 days | End: 2023-12-01
Payer: COMMERCIAL

## 2023-12-01 DIAGNOSIS — Z90.5 ACQUIRED ABSENCE OF KIDNEY: Chronic | ICD-10-CM

## 2023-12-01 DIAGNOSIS — K08.409 PARTIAL LOSS OF TEETH, UNSPECIFIED CAUSE, UNSPECIFIED CLASS: Chronic | ICD-10-CM

## 2023-12-01 DIAGNOSIS — Z90.49 ACQUIRED ABSENCE OF OTHER SPECIFIED PARTS OF DIGESTIVE TRACT: Chronic | ICD-10-CM

## 2023-12-01 DIAGNOSIS — Z98.890 OTHER SPECIFIED POSTPROCEDURAL STATES: Chronic | ICD-10-CM

## 2023-12-01 DIAGNOSIS — Q63.9 CONGENITAL MALFORMATION OF KIDNEY, UNSPECIFIED: Chronic | ICD-10-CM

## 2023-12-01 DIAGNOSIS — Z98.89 OTHER SPECIFIED POSTPROCEDURAL STATES: Chronic | ICD-10-CM

## 2023-12-01 PROCEDURE — 73502 X-RAY EXAM HIP UNI 2-3 VIEWS: CPT

## 2023-12-01 PROCEDURE — 73502 X-RAY EXAM HIP UNI 2-3 VIEWS: CPT | Mod: 26,RT

## 2023-12-07 ENCOUNTER — TRANSCRIPTION ENCOUNTER (OUTPATIENT)
Age: 43
End: 2023-12-07

## 2023-12-14 ENCOUNTER — APPOINTMENT (OUTPATIENT)
Dept: NEUROLOGY | Facility: CLINIC | Age: 43
End: 2023-12-14

## 2024-01-04 ENCOUNTER — TRANSCRIPTION ENCOUNTER (OUTPATIENT)
Age: 44
End: 2024-01-04

## 2024-01-10 ENCOUNTER — TRANSCRIPTION ENCOUNTER (OUTPATIENT)
Age: 44
End: 2024-01-10

## 2024-01-29 ENCOUNTER — TRANSCRIPTION ENCOUNTER (OUTPATIENT)
Age: 44
End: 2024-01-29

## 2024-02-12 NOTE — ED ADULT NURSE NOTE - PAIN RATING/NUMBER SCALE (0-10): ACTIVITY
6 Noa from [facility] called for verification on Aspirin dosing. States patient told Noa she is to take Aspirin every other day not every day. After review of chart states Aspirin once daily. Please clarify and call Noa at [phone]

## 2024-03-04 ENCOUNTER — TRANSCRIPTION ENCOUNTER (OUTPATIENT)
Age: 44
End: 2024-03-04

## 2024-03-04 RX ORDER — HYDROXYCHLOROQUINE SULFATE 200 MG/1
200 TABLET, FILM COATED ORAL TWICE DAILY
Qty: 60 | Refills: 0 | Status: ACTIVE | COMMUNITY
Start: 2021-04-01 | End: 1900-01-01

## 2024-03-10 PROBLEM — N30.10 INTERSTITIAL CYSTITIS: Status: ACTIVE | Noted: 2020-07-31

## 2024-03-13 NOTE — PHYSICAL THERAPY INITIAL EVALUATION ADULT - PERSONAL SAFETY AND JUDGMENT, REHAB EVAL
Head injury instructions provided and discussed with pt and family.  They are instructed to return if any worsening symptoms, worsening headache, persistent vomiting, any change in behavior or any other concerns.  Rec ice and Tylenol as needed. They will return to ED if needed otherwise will f/u with PMD.  They verbalize understanding. intact

## 2024-03-14 ENCOUNTER — LABORATORY RESULT (OUTPATIENT)
Age: 44
End: 2024-03-14

## 2024-03-14 ENCOUNTER — APPOINTMENT (OUTPATIENT)
Dept: RHEUMATOLOGY | Facility: CLINIC | Age: 44
End: 2024-03-14
Payer: MEDICAID

## 2024-03-14 VITALS
SYSTOLIC BLOOD PRESSURE: 129 MMHG | WEIGHT: 153 LBS | HEART RATE: 90 BPM | DIASTOLIC BLOOD PRESSURE: 77 MMHG | HEIGHT: 68 IN | TEMPERATURE: 98.3 F | OXYGEN SATURATION: 98 % | BODY MASS INDEX: 23.19 KG/M2

## 2024-03-14 PROCEDURE — 99215 OFFICE O/P EST HI 40 MIN: CPT | Mod: 25

## 2024-03-14 RX ORDER — DICLOFENAC SODIUM 3 G/100G
3 GEL TOPICAL TWICE DAILY
Qty: 1 | Refills: 3 | Status: ACTIVE | COMMUNITY
Start: 2024-03-14 | End: 1900-01-01

## 2024-03-14 RX ORDER — LISDEXAMFETAMINE DIMESYLATE 20 MG/1
20 CAPSULE ORAL
Refills: 0 | Status: ACTIVE | COMMUNITY

## 2024-03-14 RX ORDER — DULOXETINE HYDROCHLORIDE 20 MG/1
20 CAPSULE, DELAYED RELEASE ORAL DAILY
Refills: 0 | Status: ACTIVE | COMMUNITY

## 2024-03-14 NOTE — REASON FOR VISIT
DISCHARGE INSTRUCTIONS     As part of your transition home, we are providing you with this set of discharge instructions, as a guide to help you in that process. In addition to the care provided during your hospital stay, there may be upcoming clinic visits, laboratory appointments, and/or results noted on this After Visit Summary (AVS). To assist the physicians caring for you during this transition, we would like you remind you of the followin. Please call a Provider for help if you experience any of the following: Any questions or concerns  2. Activity Instructions: Normal activity as tolerated  3. Dressing/Wound Instructions: Not applicable  4. Lifting & Weight-bearing Instructions: No restrictions  5. Diet: Renal  6. Miscellaneous:   -Please go to all scheduled follow-up appointments and Dialysis. -Please take the medications as prescribed, you received 30 days worth of medications, your doctor's will need to prescribe more following appointments. If you have any questions 24-48 hours after discharge, please feel free to contact the hospital unit/department you were discharged from. (2) assistive person [Follow-Up: _____] : a [unfilled] follow-up visit

## 2024-03-22 LAB
25(OH)D3 SERPL-MCNC: 40 NG/ML
ALBUMIN MFR SERPL ELPH: 57.2 %
ALBUMIN SERPL-MCNC: 4.5 G/DL
ALBUMIN/GLOB SERPL: 1.4 RATIO
ALPHA1 GLOB MFR SERPL ELPH: 3.4 %
ALPHA1 GLOB SERPL ELPH-MCNC: 0.3 G/DL
ALPHA2 GLOB MFR SERPL ELPH: 7.3 %
ALPHA2 GLOB SERPL ELPH-MCNC: 0.6 G/DL
APPEARANCE: CLEAR
B-GLOBULIN MFR SERPL ELPH: 13.4 %
B-GLOBULIN SERPL ELPH-MCNC: 1 G/DL
BILIRUBIN URINE: NEGATIVE
BLOOD URINE: NEGATIVE
C3 SERPL-MCNC: 115 MG/DL
C4 SERPL-MCNC: 18 MG/DL
COLOR: YELLOW
CRP SERPL-MCNC: <3 MG/L
DSDNA AB SER-ACNC: <12 IU/ML
ENA RNP AB SER IA-ACNC: 0.3 AL
ENA SM AB SER IA-ACNC: <0.2 AL
ENA SS-A AB SER IA-ACNC: >8 AL
ENA SS-B AB SER IA-ACNC: >8 AL
FOLATE SERPL-MCNC: 7.8 NG/ML
GAMMA GLOB FLD ELPH-MCNC: 1.5 G/DL
GAMMA GLOB MFR SERPL ELPH: 18.7 %
GLUCOSE QUALITATIVE U: NEGATIVE MG/DL
INTERPRETATION SERPL IEP-IMP: NORMAL
IRON SATN MFR SERPL: 13 %
IRON SERPL-MCNC: 46 UG/DL
KETONES URINE: NEGATIVE MG/DL
LEUKOCYTE ESTERASE URINE: NEGATIVE
M PROTEIN SPEC IFE-MCNC: NORMAL
NITRITE URINE: NEGATIVE
PH URINE: 7
PROT SERPL-MCNC: 7.8 G/DL
PROT SERPL-MCNC: 7.8 G/DL
PROTEIN URINE: NEGATIVE MG/DL
RHEUMATOID FACT SER QL: 13 IU/ML
SPECIFIC GRAVITY URINE: 1.01
TIBC SERPL-MCNC: 355 UG/DL
UIBC SERPL-MCNC: 309 UG/DL
UROBILINOGEN URINE: 0.2 MG/DL
VIT B12 SERPL-MCNC: 385 PG/ML

## 2024-03-22 NOTE — PATIENT PROFILE ADULT - FUNCTIONAL ASSESSMENT - DAILY ACTIVITY 5.
Problem: Discharge Planning  Goal: Discharge to home or other facility with appropriate resources  3/22/2024 0021 by Praveen Aguilar RN  Outcome: Progressing     Problem: Pain  Goal: Verbalizes/displays adequate comfort level or baseline comfort level  3/22/2024 0021 by Praveen Aguilar RN  Outcome: Progressing     Problem: ABCDS Injury Assessment  Goal: Absence of physical injury  3/22/2024 0021 by Praveen Aguilar RN  Outcome: Progressing     Problem: Safety - Adult  Goal: Free from fall injury  3/22/2024 0021 by Praveen Aguilar RN  Outcome: Progressing     Problem: Gastrointestinal - Adult  Goal: Minimal or absence of nausea and vomiting  Outcome: Progressing     Problem: Gastrointestinal - Adult  Goal: Maintains or returns to baseline bowel function  Outcome: Progressing     Problem: Gastrointestinal - Adult  Goal: Maintains adequate nutritional intake  Outcome: Progressing      4 = No assist / stand by assistance

## 2024-03-27 ENCOUNTER — APPOINTMENT (OUTPATIENT)
Dept: RHEUMATOLOGY | Facility: CLINIC | Age: 44
End: 2024-03-27
Payer: MEDICAID

## 2024-03-27 DIAGNOSIS — Z98.890 OTHER SPECIFIED POSTPROCEDURAL STATES: ICD-10-CM

## 2024-03-27 DIAGNOSIS — I82.409 ACUTE EMBOLISM AND THROMBOSIS OF UNSPECIFIED DEEP VEINS OF UNSPECIFIED LOWER EXTREMITY: ICD-10-CM

## 2024-03-27 DIAGNOSIS — R76.8 OTHER SPECIFIED ABNORMAL IMMUNOLOGICAL FINDINGS IN SERUM: ICD-10-CM

## 2024-03-27 DIAGNOSIS — M35.00 SICCA SYNDROME, UNSPECIFIED: ICD-10-CM

## 2024-03-27 PROCEDURE — 99214 OFFICE O/P EST MOD 30 MIN: CPT

## 2024-03-27 NOTE — REASON FOR VISIT
[Follow-Up: _____] : a [unfilled] follow-up visit [FreeTextEntry1] : History of Sjogren disease ,  + MAX, SSA and SSB

## 2024-03-27 NOTE — ASSESSMENT
[FreeTextEntry1] : 43 y old F With PMH of Sjogren syndrome with sicca symptoms + SSA and SSB strongly positive + MAX, Tx HCQ , also hip dysplasia s/p surgery planned for surgery 4/19/24 removal of screws , history of labral tear and bl shoulder dislocation s/p shoulder surgery, hyper mobility syndrome possible EDS , Migraine, Anxiety/Depression with stable mood last visit with arthralgia bl shoulder hip also R first CMC joint due to tendinopathy possible DJD also 1 CMC no joint effusion was appreciated then , no change of symptoms since then  -suggested to use thumb splint , once she got from [employer] she could not use as was causing discomfort, suggested topical diclofenac gel 3-4 times on affected area  ,  also doing PT for hips suggested OT and also for fitting splint  -if no improvement can consider injection, hand exercise discussed -Sjogren mild sicca symptoms able to wear contacts not sig mouth dryness, cw  mg twice a day tolerates well, annual retinopathy screen needed will follow up with ophthalmology -repeat blood work discussed and reviewed  + MAX, SSA and SSB  ( normal RF and CCP, Forbes RNA and DS DNA, normal C3 and C4 ,  lupus Anticoagulant negative, B2 glycoprotein and DEONTE  not increased, ESR and CRP normal , UA no blood or protein , serum electrophoresis normal pattern ) -vit D 40 cw calcium and vit D supplements  -Iron saturation low but Binding capacity not increased and total iron serum 46 , will monitor  -no weight change, no night sweats, no inflammatory arthritis suggested with exam -update bl hand x rays -history of R LE DVT after prolonged immobilization with hip surgery tx Eliquis 3 month, will check APLS but DVT prophylaxis after surgery will depend on need for prolonged immobilization discussed with the patient -muscle strengthening exercise discussed -with increased sensitivity and paresthesias small fiber neuropathy possible with history of Sjogren , followed by Neurology for Migraine headaches and after starting Emgality improved Headache -Anxiety /depression followed by Psychiatry tx Cymbalta 20 mg daily improved mood also can help with neuropathy and myalgia

## 2024-03-27 NOTE — HISTORY OF PRESENT ILLNESS
[___ Week(s) Ago] : [unfilled] week(s) ago [FreeTextEntry1] : no new rash, persistent pain, bl shoulder and R hand mostly thumb no morning stiffness no joint swelling and  pain mostly worse after activity  bough Thumb splint as directed but did not fit well,  followed by PT for hip

## 2024-03-27 NOTE — DATA REVIEWED
[FreeTextEntry1] : 3/14/24 RF negative , CCP negative  UA no blood or protein  Protein Electrophoresis normal pattern , ROSAMARIA no monoclonal band   Vit D -40  CRP and ESR normal   Lupus coagulant  Sillica and Diluated sol viper negative  , DEONTE and B2 glycoprotein negative  C3 and C4 normal  Forbes and RNP normal , DS DNA normal  UA no protein or blood

## 2024-03-27 NOTE — PHYSICAL EXAM
[General Appearance - Well Nourished] : well nourished [General Appearance - In No Acute Distress] : in no acute distress [General Appearance - Well-Appearing] : healthy appearing [Sclera] : the sclera and conjunctiva were normal [Neck Appearance] : the appearance of the neck was normal [] : no respiratory distress [FreeTextEntry1] : no active rash seen  [Oriented To Time, Place, And Person] : oriented to person, place, and time [Impaired Insight] : insight and judgment were intact [Affect] : the affect was normal [Mood] : the mood was normal

## 2024-04-08 ENCOUNTER — TRANSCRIPTION ENCOUNTER (OUTPATIENT)
Age: 44
End: 2024-04-08

## 2024-04-11 ENCOUNTER — TRANSCRIPTION ENCOUNTER (OUTPATIENT)
Age: 44
End: 2024-04-11

## 2024-04-11 RX ORDER — HYDROXYCHLOROQUINE SULFATE 200 MG/1
200 TABLET, FILM COATED ORAL
Qty: 60 | Refills: 3 | Status: ACTIVE | COMMUNITY
Start: 2024-04-11 | End: 1900-01-01

## 2024-04-17 NOTE — PHYSICAL EXAM
[General Appearance - Alert] : alert [General Appearance - Well Nourished] : well nourished [Sclera] : the sclera and conjunctiva were normal [PERRL With Normal Accommodation] : pupils were equal in size, round, and reactive to light [Extraocular Movements] : extraocular movements were intact [Outer Ear] : the ears and nose were normal in appearance [Nasal Cavity] : the nasal mucosa and septum were normal [Neck Appearance] : the appearance of the neck was normal [Neck Cervical Mass (___cm)] : no neck mass was observed [Exaggerated Use Of Accessory Muscles For Inspiration] : no accessory muscle use [Respiration, Rhythm And Depth] : normal respiratory rhythm and effort [Auscultation Breath Sounds / Voice Sounds] : lungs were clear to auscultation bilaterally [Apical Impulse] : the apical impulse was normal [Heart Rate And Rhythm] : heart rate was normal and rhythm regular [Heart Sounds] : normal S1 and S2 [Heart Sounds Gallop] : no gallops [Murmurs] : no murmurs [Bowel Sounds] : normal bowel sounds [Abdomen Soft] : soft [Abdomen Tenderness] : non-tender [] : no hepato-splenomegaly [Abdomen Mass (___ Cm)] : no abdominal mass palpated [Cervical Lymph Nodes Enlarged Posterior Bilaterally] : posterior cervical [Cervical Lymph Nodes Enlarged Anterior Bilaterally] : anterior cervical [Supraclavicular Lymph Nodes Enlarged Bilaterally] : supraclavicular [Axillary Lymph Nodes Enlarged Bilaterally] : axillary [Abnormal Walk] : normal gait [Musculoskeletal - Swelling] : no joint swelling seen [Motor Exam] : the motor exam was normal [Oriented To Time, Place, And Person] : oriented to person, place, and time [Affect] : the affect was normal [Mood] : the mood was normal [FreeTextEntry1] : R 1 cmc joint tenderness, and enlargement but no effusion appreciated , tender 1 MTP joints no effusion , no tender points muscle strength 5/5 upper and lower extremity

## 2024-04-17 NOTE — PHYSICAL EXAM
[General Appearance - Alert] : alert [General Appearance - Well Nourished] : well nourished [Sclera] : the sclera and conjunctiva were normal [PERRL With Normal Accommodation] : pupils were equal in size, round, and reactive to light [Extraocular Movements] : extraocular movements were intact [Nasal Cavity] : the nasal mucosa and septum were normal [Outer Ear] : the ears and nose were normal in appearance [Neck Appearance] : the appearance of the neck was normal [Neck Cervical Mass (___cm)] : no neck mass was observed [Respiration, Rhythm And Depth] : normal respiratory rhythm and effort [Exaggerated Use Of Accessory Muscles For Inspiration] : no accessory muscle use [Auscultation Breath Sounds / Voice Sounds] : lungs were clear to auscultation bilaterally [Apical Impulse] : the apical impulse was normal [Heart Rate And Rhythm] : heart rate was normal and rhythm regular [Heart Sounds] : normal S1 and S2 [Heart Sounds Gallop] : no gallops [Murmurs] : no murmurs [Abdomen Soft] : soft [Bowel Sounds] : normal bowel sounds [Abdomen Tenderness] : non-tender [] : no hepato-splenomegaly [Abdomen Mass (___ Cm)] : no abdominal mass palpated [Cervical Lymph Nodes Enlarged Posterior Bilaterally] : posterior cervical [Cervical Lymph Nodes Enlarged Anterior Bilaterally] : anterior cervical [Supraclavicular Lymph Nodes Enlarged Bilaterally] : supraclavicular [Axillary Lymph Nodes Enlarged Bilaterally] : axillary [Abnormal Walk] : normal gait [Musculoskeletal - Swelling] : no joint swelling seen [Motor Exam] : the motor exam was normal [Oriented To Time, Place, And Person] : oriented to person, place, and time [Affect] : the affect was normal [Mood] : the mood was normal [FreeTextEntry1] : R 1 cmc joint tenderness, and enlargement but no effusion appreciated , tender 1 MTP joints no effusion , no tender points muscle strength 5/5 upper and lower extremity

## 2024-04-17 NOTE — ASSESSMENT
[FreeTextEntry1] : 43 y old F With PMH of Sjogren syndrome with sicca symptoms  + SSA and SSB strongly positive + MAX, Tx HCQ , also hip dysplasia s/p surgery, history of labral tear and bl shoulder dislocation s/p shoulder surgery, hypermobility syndrome possible EDS , Migraine, Anxiety/Depression with stable mood with worse 1 R thumb pain with exam no effusion suggested DJD 1 CMC joint ( paints for hours) , suggested to use thumb splint that she has, and topical diclofenac gel , if no improvement can consider injection, hand exercise discussed -Sjogren mild sicca symptoms able to wear contacts not sig mouth dryness, cw  mg twice a day tolerates well, annual retinopathy screen needed will follow up with opthalmology -will update blood work but no symptoms to suggest systemic complications , no weight change, no night sweats, no inflammatory arthritis suggested with exam -check ESR ,CRP, C3 and C4 , RF ( history of + MAX, SSA and SSB, in the past - RF, CCP, DS DNA, Forbes, RNA) -update bl hand x rays  -history of R LE DVT after prolonged immobilization with hip surgery tx Eliquis 3 month, will check APLS but DVT prophylaxis after surgery will depend on need for prolonged immobilization discussed with the patient  -muscle strengthening exercise discussed -with increased sensitivity and paresthesias small fiber neuropathy possible with history of Sjogren , followed by Neurology for Migraine headaches and after starting Emgality  -Anxiety /depression followed by Psychiatry tx Cymbalta 20 mg daily improved mood

## 2024-04-17 NOTE — HISTORY OF PRESENT ILLNESS
[FreeTextEntry1] : Patient Was followed by Dr Lei and Dr Hall and then seen once By Dr Eugene last time 6/26/23, prior to that seen by Dr Lei 5/23/22   HPI:  43 y old F followed by Rheumatology for Sjogren with history of dry eyes and mouth but minimal able to were contacts, and not increased tooth decay  ( + MAX, SSA and SSB ) tx HCQ, Methotrexate stopped due to recurrent UTI   Also with hypermobility possible EDS with history of dislocated shoulder and multiple BL upper and lower extremities surgeries , hip dysplasia and labral tear, followed by surgery had bl shoulder and Last Hip surgery 10/22 planned next moth for surgery for removal of screws ,  Also history of  R LE DVT after prolonged immobilization after hip surgery and , R upper extremity superficial venous thrombosis after shoulder surgery  Also history of anxiety depression, followed by Psychiatry on medications mood stable denies SI   Has worse R hand thumb pain worse with activity, prolonged hours of painting , is artist mostly oil painting , also increased sensitivity and occasional numbness and tingling of bl upper and lower extremities   Medications: HCQ for years tolerating well , last eye exam 2022 no history of Maculopathy  Occasionally Gabapentin  Cymbalta 20 mg daily   Medication history: Methotrexate stopped in the past due to recurrent UTI

## 2024-04-29 ENCOUNTER — TRANSCRIPTION ENCOUNTER (OUTPATIENT)
Age: 44
End: 2024-04-29

## 2024-04-30 ENCOUNTER — TRANSCRIPTION ENCOUNTER (OUTPATIENT)
Age: 44
End: 2024-04-30

## 2024-05-06 ENCOUNTER — TRANSCRIPTION ENCOUNTER (OUTPATIENT)
Age: 44
End: 2024-05-06

## 2024-05-06 RX ORDER — NARATRIPTAN 2.5 MG/1
2.5 TABLET, FILM COATED ORAL
Qty: 9 | Refills: 3 | Status: ACTIVE | COMMUNITY
Start: 2018-10-03 | End: 1900-01-01

## 2024-05-08 ENCOUNTER — TRANSCRIPTION ENCOUNTER (OUTPATIENT)
Age: 44
End: 2024-05-08

## 2024-05-09 ENCOUNTER — APPOINTMENT (OUTPATIENT)
Dept: NEUROLOGY | Facility: CLINIC | Age: 44
End: 2024-05-09
Payer: MEDICAID

## 2024-05-09 ENCOUNTER — TRANSCRIPTION ENCOUNTER (OUTPATIENT)
Age: 44
End: 2024-05-09

## 2024-05-09 VITALS
BODY MASS INDEX: 22.35 KG/M2 | SYSTOLIC BLOOD PRESSURE: 120 MMHG | DIASTOLIC BLOOD PRESSURE: 79 MMHG | HEART RATE: 92 BPM | WEIGHT: 147 LBS | OXYGEN SATURATION: 98 % | RESPIRATION RATE: 17 BRPM | TEMPERATURE: 98.1 F

## 2024-05-09 PROCEDURE — 99214 OFFICE O/P EST MOD 30 MIN: CPT

## 2024-05-09 RX ORDER — ONABOTULINUMTOXINA 100 [USP'U]/1
100 INJECTION, POWDER, LYOPHILIZED, FOR SOLUTION INTRADERMAL; INTRAMUSCULAR
Qty: 1 | Refills: 3 | Status: ACTIVE | COMMUNITY
Start: 2024-05-09 | End: 1900-01-01

## 2024-05-09 NOTE — HISTORY OF PRESENT ILLNESS
[FreeTextEntry1] : CARLOS MACK is a 44 year who returns to follow up for a new issue  last visit was 6/6/2023 when we continued emgality.  apparently the prescription seems to be delivered late every time and she notes it is very effective. SNF symptoms treated with 300mg QHS effectively  since last visit reports long history of involuntary jaw clenching.  she doesn't have an impulse to clench but will often notice she is clenching and then be able to briefly stop clenching. she gets it to relax by massaging it and using a facial roller every night.  she notes that if she puts her numb on her lower teeth this can keep her jaw open, but it is embarrassing to do this too much

## 2024-05-09 NOTE — PHYSICAL EXAM
[FreeTextEntry1] : General: this is a pleasant patient w/ mild HA today    HEENT conjunctiva are normal, oropharynx is clear, no tenderness in head    CV: normal pulses, regular rate and rhythm, no peripheral edema noted    Lungs: breathing is non-labored    abd: soft and non-distended    Mental status: Alert and oriented to person, place and time  Language is normal    Cranial Nerves:  extra-occular movements in tact without nystagmus, normal saccades and smooth pursuit, visual fields full to confrontation, pupils equal, round and reactive to light. Face symmetric and facial strength symmetric, facial sensation symmetric, hearing present bilaterally to finger rub, tongue and uvula midline. neck flexion and extension normal strength. + masseter hypertrophy    Motor: normal bulk and tone throughout. no abnormal movements. Full 5/5 strength uppers and lower extremities proximally and distally    Sensory: in tact and symmetric to vibration, light tough, pin prick, temperature    Cerebellar: normal finger-nose-finger bilaterally    Reflexes: 2+ in the upper and lower extremities and symmetric. toes are bilaterally downgoing.    Gait: stable, able to tip toe heel and tandem    Stances: narrow  Romberg: neg.

## 2024-05-13 RX ORDER — ELETRIPTAN HYDROBROMIDE 40 MG/1
40 TABLET, FILM COATED ORAL
Qty: 18 | Refills: 1 | Status: DISCONTINUED | COMMUNITY
Start: 2024-05-09 | End: 2024-05-13

## 2024-05-20 ENCOUNTER — TRANSCRIPTION ENCOUNTER (OUTPATIENT)
Age: 44
End: 2024-05-20

## 2024-05-24 ENCOUNTER — APPOINTMENT (OUTPATIENT)
Dept: NEUROLOGY | Facility: CLINIC | Age: 44
End: 2024-05-24
Payer: MEDICAID

## 2024-05-24 DIAGNOSIS — G24.4 IDIOPATHIC OROFACIAL DYSTONIA: ICD-10-CM

## 2024-05-24 DIAGNOSIS — G43.719 CHRONIC MIGRAINE W/OUT AURA, INTRACTABLE, W/OUT STATUS MIGRAINOSUS: ICD-10-CM

## 2024-05-24 PROCEDURE — 99213 OFFICE O/P EST LOW 20 MIN: CPT | Mod: 25

## 2024-05-24 PROCEDURE — 64612 DESTROY NERVE FACE MUSCLE: CPT | Mod: 50

## 2024-05-24 NOTE — PROCEDURE
[FreeTextEntry1] : Dystonia botox procedure: Injection Procedure Record Dilution: 2mL of preservative-free saline per 100U of Botox, Needle: 30 gauge half inch Administration: Inject 0.1 mL (5 units) intramuscularly at each injection site. Side: bilateral EMG guidance: no Tolerated well: Yes Complications: No  Muscle: dosage Masseter 5 x 2 Temporalis 15 x 2   Total units used for injection:  40U Waste:  60U  Botox vial information: Lot number: [id number] Expiration date: 04/2026  Note: Botulinum toxin was charged   I discussed my findings, assessment and plan of action with patient. Potential risks and benefits of injecting Botox discussed in detail and all questions and concerns answered. Potential risks and benefits of other newly prescribed or medications that were continued/renewed reviewed with patient as well. Immediate questions answered. Patient verbalized understanding and agreed with treatment plan. Patient is to follow up with me as above, or sooner as necessary. Patient is to call or e-mail me back with questions or concerns.

## 2024-05-24 NOTE — HISTORY OF PRESENT ILLNESS
[FreeTextEntry1] : CARLOS MACK is a 44 year who returns to follow up for dystonia to start botox  last visit was 5/9/2024 when I tried to prescribe eletriptan.   since last visit she is unchanged in terms of jaw smyptoms  eletriptan was not covered so almotriptan 12.5mg which is working better. had hip surgery to remove screws and has had a terrible migraine for the last week.  taking daily elewhich does help.

## 2024-05-30 ENCOUNTER — TRANSCRIPTION ENCOUNTER (OUTPATIENT)
Age: 44
End: 2024-05-30

## 2024-05-30 RX ORDER — PREDNISONE 10 MG/1
10 TABLET ORAL
Qty: 38 | Refills: 0 | Status: ACTIVE | COMMUNITY
Start: 2024-05-30 | End: 1900-01-01

## 2024-05-31 ENCOUNTER — TRANSCRIPTION ENCOUNTER (OUTPATIENT)
Age: 44
End: 2024-05-31

## 2024-06-04 ENCOUNTER — TRANSCRIPTION ENCOUNTER (OUTPATIENT)
Age: 44
End: 2024-06-04

## 2024-06-07 ENCOUNTER — TRANSCRIPTION ENCOUNTER (OUTPATIENT)
Age: 44
End: 2024-06-07

## 2024-06-18 ENCOUNTER — TRANSCRIPTION ENCOUNTER (OUTPATIENT)
Age: 44
End: 2024-06-18

## 2024-06-26 ENCOUNTER — TRANSCRIPTION ENCOUNTER (OUTPATIENT)
Age: 44
End: 2024-06-26

## 2024-07-01 ENCOUNTER — TRANSCRIPTION ENCOUNTER (OUTPATIENT)
Age: 44
End: 2024-07-01

## 2024-07-05 ENCOUNTER — TRANSCRIPTION ENCOUNTER (OUTPATIENT)
Age: 44
End: 2024-07-05

## 2024-07-11 ENCOUNTER — TRANSCRIPTION ENCOUNTER (OUTPATIENT)
Age: 44
End: 2024-07-11

## 2024-07-30 ENCOUNTER — APPOINTMENT (OUTPATIENT)
Dept: OBGYN | Facility: CLINIC | Age: 44
End: 2024-07-30

## 2024-08-05 ENCOUNTER — TRANSCRIPTION ENCOUNTER (OUTPATIENT)
Age: 44
End: 2024-08-05

## 2024-08-23 ENCOUNTER — TRANSCRIPTION ENCOUNTER (OUTPATIENT)
Age: 44
End: 2024-08-23

## 2024-08-25 ENCOUNTER — NON-APPOINTMENT (OUTPATIENT)
Age: 44
End: 2024-08-25

## 2024-08-26 ENCOUNTER — APPOINTMENT (OUTPATIENT)
Dept: NEUROLOGY | Age: 44
End: 2024-08-26
Payer: MEDICAID

## 2024-08-26 VITALS
SYSTOLIC BLOOD PRESSURE: 121 MMHG | OXYGEN SATURATION: 96 % | TEMPERATURE: 98 F | RESPIRATION RATE: 17 BRPM | WEIGHT: 145 LBS | BODY MASS INDEX: 21.98 KG/M2 | HEART RATE: 94 BPM | DIASTOLIC BLOOD PRESSURE: 76 MMHG | HEIGHT: 68 IN

## 2024-08-26 DIAGNOSIS — G24.4 IDIOPATHIC OROFACIAL DYSTONIA: ICD-10-CM

## 2024-08-26 DIAGNOSIS — F43.21 ADJUSTMENT DISORDER WITH DEPRESSED MOOD: ICD-10-CM

## 2024-08-26 DIAGNOSIS — G43.719 CHRONIC MIGRAINE W/OUT AURA, INTRACTABLE, W/OUT STATUS MIGRAINOSUS: ICD-10-CM

## 2024-08-26 PROCEDURE — 99213 OFFICE O/P EST LOW 20 MIN: CPT | Mod: 25

## 2024-08-26 PROCEDURE — 64612 DESTROY NERVE FACE MUSCLE: CPT | Mod: 50

## 2024-08-26 NOTE — PHYSICAL EXAM
[FreeTextEntry1] : General: this is a pleasant patient w/ mild HA today    HEENT conjunctiva are normal, oropharynx is clear, no tenderness in head    CV: normal pulses, regular rate and rhythm, no peripheral edema noted    Lungs: breathing is non-labored    abd: soft and non-distended  MSK: severe tension in b/l traps but worse on L  Mental status: Alert and oriented to person, place and time  Language is normal    Cranial Nerves:  extra-occular movements in tact without nystagmus, normal saccades and smooth pursuit, visual fields full to confrontation, pupils equal, round and reactive to light. Face symmetric and facial strength symmetric, facial sensation symmetric, hearing present bilaterally to finger rub, tongue and uvula midline. neck flexion and extension normal strength. + masseter hypertrophy    Motor: normal bulk and tone throughout. no abnormal movements. Full 5/5 strength uppers and lower extremities proximally and distally    Sensory: in tact and symmetric to vibration, light tough, pin prick, temperature    Cerebellar: normal finger-nose-finger bilaterally    Reflexes: 2+ in the upper and lower extremities and symmetric. toes are bilaterally downgoing.    Gait: stable, able to tip toe heel and tandem Romberg: neg.

## 2024-08-26 NOTE — PROCEDURE
[FreeTextEntry1] :     Dystonia botox procedure: Injection Procedure Record Dilution: 2mL of preservative-free saline per 100U of Botox, Needle: 30 gauge half inch Administration: Inject 0.1 mL (5 units) intramuscularly at each injection site. Side: bilateral EMG guidance: no Tolerated well: Yes Complications: No  Muscle: dosage Masseter 10 x 2 Temporalis 15 x 2   Total units used for injection: 50U Waste: 50U  Botox vial information: Lot number: [id number] Expiration date: 05/2026  Note: Botulinum toxin was charged  I discussed my findings, assessment and plan of action with patient. Potential risks and benefits of injecting Botox discussed in detail and all questions and concerns answered. Potential risks and benefits of other newly prescribed or medications that were continued/renewed reviewed with patient as well. Immediate questions answered. Patient verbalized understanding and agreed with treatment plan. Patient is to follow up with me as above, or sooner as necessary. Patient is to call or e-mail me back with questions or concerns.

## 2024-09-05 ENCOUNTER — TRANSCRIPTION ENCOUNTER (OUTPATIENT)
Age: 44
End: 2024-09-05

## 2024-09-06 ENCOUNTER — APPOINTMENT (OUTPATIENT)
Dept: INTERNAL MEDICINE | Facility: CLINIC | Age: 44
End: 2024-09-06
Payer: MEDICAID

## 2024-09-06 VITALS
TEMPERATURE: 97.4 F | HEART RATE: 78 BPM | DIASTOLIC BLOOD PRESSURE: 72 MMHG | OXYGEN SATURATION: 99 % | WEIGHT: 149 LBS | BODY MASS INDEX: 22.58 KG/M2 | SYSTOLIC BLOOD PRESSURE: 123 MMHG | HEIGHT: 68 IN

## 2024-09-06 DIAGNOSIS — Z00.00 ENCOUNTER FOR GENERAL ADULT MEDICAL EXAMINATION W/OUT ABNORMAL FINDINGS: ICD-10-CM

## 2024-09-06 PROBLEM — R10.9 ABDOMINAL PAIN: Status: ACTIVE | Noted: 2024-09-06

## 2024-09-06 PROCEDURE — 99213 OFFICE O/P EST LOW 20 MIN: CPT | Mod: 25

## 2024-09-06 PROCEDURE — 99396 PREV VISIT EST AGE 40-64: CPT | Mod: 25

## 2024-09-06 NOTE — HISTORY OF PRESENT ILLNESS
[FreeTextEntry1] : pt is here for annual physical and request referrals [de-identified] : 43 y/o F with an extensive medical and surgical hx including ADHD, depression/anxiety, endometriosis, ovarian cyst, Sjogren's syndrome, migraine HAs, right nephrectomy 2012, restless leg syndrome, and recent right periacetabular osteotomy in 10/21/22. Last seen in 11/2023 for Vyvanse refill, has since found an outside prescriber.   Today, patient c/o 3 mo. h/o abd/pelvic pain w/ associated intermittent diarrhea that lasts 1wk and occurs q1mo. At sx onset, abd pain was constant and lasting 3d, severe, burning, located L of umbilicus, and not alleviated by Tylenol. Now, pain dull, achy, and located at the LLQ/L pelvic region. Pt notes current pain is different from endometriosis/menstrual pain. Denies  sx, changes to menstrual cycle, unintentional weight loss, worsening of chronic bloating, loss of appetite (though notes decreased po intake since sx onset), or nausea. Denies chance she may be pregnant.  Notes intermittent large-volume, loose-liquid brown-colored diarrhea w/ x8-10 BMs/d. Also notes worsening of chronic constipation 2/2 colonic endometriosis for which she takes docusate BID. At this time, constipation occurs regardless of point in menstrual cycle. Denies worsening or alleviation of abd pain w/ BMs.   Scheduled for gyn eval at the end of this month. Follows w/ Dr. Alexandre (GI).    Social Hx - Diet: Plant-based, avoids dairy and gluten. 1 large meal, 2 snacks per day since sx onset (at baseline, eats 3 meals/d) - Exercise: none since hip hardware removal sx 2 mo/a. - Alcohol: 2x a week, 2 drinks per sitting - Tobacco: never - Cannabis: none - Non-rx substances: none

## 2024-09-06 NOTE — ASSESSMENT
[FreeTextEntry1] : #HCM - CBC, CMP today - Referred for mammogram today - Flu shot at next visit  F/u in 2 weeks via telephonic visit to review CTAP.

## 2024-09-06 NOTE — END OF VISIT
[FreeTextEntry3] : 43 yo female with hx endometriosis, colon resection, R nephrectomy, here for CPE and c/o abd pain   1) abdominal pain -   x 3 months. normal CRP GI cause vs. endometriosis + tenderness on exam.  Will get CT abdomen.  f/u GI and gyn  2) HCM - mammo referral f/u gyn

## 2024-09-06 NOTE — HEALTH RISK ASSESSMENT
[Yes] : Yes [2 - 3 times a week (3 pts)] : 2 - 3  times a week (3 points) [1 or 2 (0 pts)] : 1 or 2 (0 points) [Never (0 pts)] : Never (0 points) [PHQ-2 Negative - No further assessment needed] : PHQ-2 Negative - No further assessment needed [Never] : Never

## 2024-09-06 NOTE — PHYSICAL EXAM
[Normal] : no joint swelling and grossly normal strength and tone [Non-distended] : non-distended [de-identified] : TTP and fullness in suprapubic region. No CVA tenderness.

## 2024-09-06 NOTE — REVIEW OF SYSTEMS
[Negative] : Musculoskeletal [Abdominal Pain] : abdominal pain [Constipation] : constipation [Diarrhea] : diarrhea [Nausea] : no nausea [Vomiting] : no vomiting [Heartburn] : no heartburn [Melena] : no melena

## 2024-09-09 ENCOUNTER — APPOINTMENT (OUTPATIENT)
Dept: ULTRASOUND IMAGING | Facility: CLINIC | Age: 44
End: 2024-09-09

## 2024-09-09 LAB
ALBUMIN SERPL ELPH-MCNC: 4.1 G/DL
ALP BLD-CCNC: 50 U/L
ALT SERPL-CCNC: 13 U/L
ANION GAP SERPL CALC-SCNC: 12 MMOL/L
AST SERPL-CCNC: 22 U/L
BASOPHILS # BLD AUTO: 0.04 K/UL
BASOPHILS NFR BLD AUTO: 0.6 %
BILIRUB SERPL-MCNC: 0.4 MG/DL
BUN SERPL-MCNC: 10 MG/DL
CALCIUM SERPL-MCNC: 9.9 MG/DL
CHLORIDE SERPL-SCNC: 103 MMOL/L
CO2 SERPL-SCNC: 22 MMOL/L
CREAT SERPL-MCNC: 0.99 MG/DL
EGFR: 73 ML/MIN/1.73M2
EOSINOPHIL # BLD AUTO: 0.18 K/UL
EOSINOPHIL NFR BLD AUTO: 2.6 %
GLUCOSE SERPL-MCNC: 96 MG/DL
HCT VFR BLD CALC: 41 %
HGB BLD-MCNC: 12.9 G/DL
IMM GRANULOCYTES NFR BLD AUTO: 0.1 %
LYMPHOCYTES # BLD AUTO: 1.76 K/UL
LYMPHOCYTES NFR BLD AUTO: 25 %
MAN DIFF?: NORMAL
MCHC RBC-ENTMCNC: 26.1 PG
MCHC RBC-ENTMCNC: 31.5 GM/DL
MCV RBC AUTO: 83 FL
MONOCYTES # BLD AUTO: 0.7 K/UL
MONOCYTES NFR BLD AUTO: 10 %
NEUTROPHILS # BLD AUTO: 4.34 K/UL
NEUTROPHILS NFR BLD AUTO: 61.7 %
PLATELET # BLD AUTO: 291 K/UL
POTASSIUM SERPL-SCNC: 4.4 MMOL/L
PROT SERPL-MCNC: 7.2 G/DL
RBC # BLD: 4.94 M/UL
RBC # FLD: 13.1 %
SODIUM SERPL-SCNC: 137 MMOL/L
WBC # FLD AUTO: 7.03 K/UL

## 2024-09-10 ENCOUNTER — APPOINTMENT (OUTPATIENT)
Dept: INTERNAL MEDICINE | Facility: CLINIC | Age: 44
End: 2024-09-10
Payer: MEDICAID

## 2024-09-10 ENCOUNTER — TRANSCRIPTION ENCOUNTER (OUTPATIENT)
Age: 44
End: 2024-09-10

## 2024-09-10 DIAGNOSIS — R10.9 UNSPECIFIED ABDOMINAL PAIN: ICD-10-CM

## 2024-09-10 DIAGNOSIS — R19.7 DIARRHEA, UNSPECIFIED: ICD-10-CM

## 2024-09-10 PROCEDURE — ZZZZZ: CPT

## 2024-09-10 NOTE — HISTORY OF PRESENT ILLNESS
[Home] : at home, [unfilled] , at the time of the visit. [Medical Office: ([facility])___] : at the medical office located in  [Verbal consent obtained from patient] : the patient, [unfilled] [FreeTextEntry1] : persistent diarrhea [de-identified] : 43 y/o F with PMH of ADHD, depression/anxiety, endometriosis, ovarian cyst, Sjogren's syndrome, migraine HAs, right nephrectomy 2012, restless leg syndrome, and recent right periacetabular osteotomy in 10/21/22. Last seen in 9/6/24 for 3-month hx of LLQ abd/pelvic pain with intermittent diarrhea. Was planned for CT AP and close follow up with outside provider for GI Dr. Alexandre and OB/GYN.   Has been having liquid nonbloody diarrhea more frequently, approx every hour and keeps her up at night. This morning, started to improve and she had a semi-solid BM. Then completed the CT scan and the oral contrast and since has had continual diarrhea. She is wondering if it would be worth doing a stool sample as that was recommended to her last year when she had similar symptoms. Denies any blood in stool, fevers. During prior episode, diarrhea went away on its own, and has had intermittent diarrhea since that usually lasts 2-3 days away and then resolves without interventions. Additionally has intense R v. L pelvic endometriosis pain for the past 3 months, does not seem to correlate with episodes of diarrhea. Now follows with Dr. Chester Mendez. No changes to diet or medications recently.   CARLOS MACK,an established pt at this office, reached out to this provider for diarrhea.  Discussed with patient: You have chosen to receive care through the use of tele-media. Tele-media enables health care providers at different locations to provide safe, effective, and convenient care through the use of technology. Please note this is a billable encounter. As with any health care service, there are risks associated with the use of tele-media, including equipment failure, poor image and/or resolution, and  issues. You understand that I cannot physically examine you and that you may need to come to the clinic to complete the assessment. Patient agreed verbally to understanding the risks and benefits of tele-media as explained. All questions regarding tele-media encounters were answered.   Total time spent with patient on the phone is 15min.

## 2024-09-10 NOTE — END OF VISIT
[FreeTextEntry3] : I was present with the Resident during the key portions of this encounter and provided supervision via audio/visual technology.  I agree with the findings and plan as documented in the Resident's note, unless noted below.  44-year-old female presenting via telephonic visit for follow-up of diarrhea.  Reports recent worsening of loose stool since last visit on 9/6/2024.  Symptoms improved somewhat yesterday but then worsened after taking p.o. contrast for CT abdomen today.  Ordered stool studies which patient will come to the lab tomorrow to give sample.  We also referred to GI.  Will follow-up CT results when available.  Return if worsening symptoms.

## 2024-09-11 ENCOUNTER — APPOINTMENT (OUTPATIENT)
Dept: INTERNAL MEDICINE | Facility: CLINIC | Age: 44
End: 2024-09-11

## 2024-09-11 PROCEDURE — ZZZZZ: CPT

## 2024-09-12 ENCOUNTER — NON-APPOINTMENT (OUTPATIENT)
Age: 44
End: 2024-09-12

## 2024-09-13 ENCOUNTER — TRANSCRIPTION ENCOUNTER (OUTPATIENT)
Age: 44
End: 2024-09-13

## 2024-09-13 DIAGNOSIS — N94.9 UNSPECIFIED CONDITION ASSOCIATED WITH FEMALE GENITAL ORGANS AND MENSTRUAL CYCLE: ICD-10-CM

## 2024-09-16 ENCOUNTER — APPOINTMENT (OUTPATIENT)
Dept: CT IMAGING | Facility: HOSPITAL | Age: 44
End: 2024-09-16

## 2024-09-16 ENCOUNTER — TRANSCRIPTION ENCOUNTER (OUTPATIENT)
Age: 44
End: 2024-09-16

## 2024-09-16 LAB — GI PCR PANEL: NOT DETECTED

## 2024-09-17 ENCOUNTER — TRANSCRIPTION ENCOUNTER (OUTPATIENT)
Age: 44
End: 2024-09-17

## 2024-09-19 ENCOUNTER — TRANSCRIPTION ENCOUNTER (OUTPATIENT)
Age: 44
End: 2024-09-19

## 2024-09-19 LAB
C DIFF TOXIN B QL PCR REFLEX: NORMAL
CALPROTECTIN FECAL: 17 UG/G
GDH ANTIGEN: NOT DETECTED
HEMOCCULT STL QL IA: NEGATIVE
PANCREATIC ELASTASE, FECAL: 127 CD:794062645
TOXIN A AND B: NOT DETECTED

## 2024-09-30 ENCOUNTER — APPOINTMENT (OUTPATIENT)
Dept: OBGYN | Facility: CLINIC | Age: 44
End: 2024-09-30

## 2024-10-03 ENCOUNTER — TRANSCRIPTION ENCOUNTER (OUTPATIENT)
Age: 44
End: 2024-10-03

## 2024-10-09 ENCOUNTER — TRANSCRIPTION ENCOUNTER (OUTPATIENT)
Age: 44
End: 2024-10-09

## 2024-10-09 ENCOUNTER — APPOINTMENT (OUTPATIENT)
Dept: GASTROENTEROLOGY | Facility: CLINIC | Age: 44
End: 2024-10-09
Payer: MEDICAID

## 2024-10-09 VITALS
BODY MASS INDEX: 21.98 KG/M2 | SYSTOLIC BLOOD PRESSURE: 121 MMHG | HEART RATE: 85 BPM | WEIGHT: 145 LBS | OXYGEN SATURATION: 99 % | HEIGHT: 68 IN | TEMPERATURE: 96.3 F | DIASTOLIC BLOOD PRESSURE: 80 MMHG | RESPIRATION RATE: 14 BRPM

## 2024-10-09 DIAGNOSIS — R19.7 DIARRHEA, UNSPECIFIED: ICD-10-CM

## 2024-10-09 DIAGNOSIS — K92.1 MELENA: ICD-10-CM

## 2024-10-09 DIAGNOSIS — R10.13 EPIGASTRIC PAIN: ICD-10-CM

## 2024-10-09 PROCEDURE — 99204 OFFICE O/P NEW MOD 45 MIN: CPT

## 2024-10-09 RX ORDER — LOPERAMIDE HYDROCHLORIDE 2 MG/1
2 TABLET ORAL
Qty: 30 | Refills: 2 | Status: ACTIVE | COMMUNITY
Start: 2024-10-09

## 2024-10-17 ENCOUNTER — TRANSCRIPTION ENCOUNTER (OUTPATIENT)
Age: 44
End: 2024-10-17

## 2024-10-21 ENCOUNTER — TRANSCRIPTION ENCOUNTER (OUTPATIENT)
Age: 44
End: 2024-10-21

## 2024-10-22 ENCOUNTER — TRANSCRIPTION ENCOUNTER (OUTPATIENT)
Age: 44
End: 2024-10-22

## 2024-10-22 RX ORDER — METAXALONE 800 MG/1
800 TABLET ORAL 3 TIMES DAILY
Qty: 90 | Refills: 0 | Status: ACTIVE | COMMUNITY
Start: 2024-10-21

## 2024-10-24 DIAGNOSIS — A04.8 OTHER SPECIFIED BACTERIAL INTESTINAL INFECTIONS: ICD-10-CM

## 2024-10-29 ENCOUNTER — TRANSCRIPTION ENCOUNTER (OUTPATIENT)
Age: 44
End: 2024-10-29

## 2024-10-29 DIAGNOSIS — R19.7 DIARRHEA, UNSPECIFIED: ICD-10-CM

## 2024-10-29 RX ORDER — TETRACYCLINE HYDROCHLORIDE 500 MG/1
500 CAPSULE ORAL EVERY 6 HOURS
Qty: 56 | Refills: 0 | Status: ACTIVE | COMMUNITY
Start: 2024-10-24 | End: 1900-01-01

## 2024-10-29 RX ORDER — PANTOPRAZOLE 40 MG/1
40 TABLET, DELAYED RELEASE ORAL TWICE DAILY
Qty: 28 | Refills: 0 | Status: ACTIVE | COMMUNITY
Start: 2024-10-24 | End: 1900-01-01

## 2024-10-29 RX ORDER — LOPERAMIDE HYDROCHLORIDE 2 MG/1
2 TABLET ORAL
Qty: 30 | Refills: 2 | Status: ACTIVE | COMMUNITY
Start: 2024-10-29 | End: 1900-01-01

## 2024-10-29 RX ORDER — METRONIDAZOLE 500 MG/1
500 TABLET ORAL EVERY 6 HOURS
Qty: 56 | Refills: 0 | Status: ACTIVE | COMMUNITY
Start: 2024-10-24 | End: 1900-01-01

## 2024-10-29 RX ORDER — BISMUTH SUBSALICYLATE 262 MG/1
262 TABLET, CHEWABLE ORAL 4 TIMES DAILY
Qty: 112 | Refills: 0 | Status: ACTIVE | COMMUNITY
Start: 2024-10-24 | End: 1900-01-01

## 2024-11-05 ENCOUNTER — TRANSCRIPTION ENCOUNTER (OUTPATIENT)
Age: 44
End: 2024-11-05

## 2024-11-07 ENCOUNTER — NON-APPOINTMENT (OUTPATIENT)
Age: 44
End: 2024-11-07

## 2024-11-07 ENCOUNTER — TRANSCRIPTION ENCOUNTER (OUTPATIENT)
Age: 44
End: 2024-11-07

## 2024-11-07 DIAGNOSIS — A04.8 OTHER SPECIFIED BACTERIAL INTESTINAL INFECTIONS: ICD-10-CM

## 2024-11-07 RX ORDER — RIFABUTIN 150 MG/1
150 CAPSULE ORAL TWICE DAILY
Qty: 28 | Refills: 0 | Status: ACTIVE | COMMUNITY
Start: 2024-11-07 | End: 1900-01-01

## 2024-11-07 RX ORDER — AMOXICILLIN 500 MG/1
500 CAPSULE ORAL TWICE DAILY
Qty: 56 | Refills: 0 | Status: ACTIVE | COMMUNITY
Start: 2024-11-07 | End: 1900-01-01

## 2024-11-07 RX ORDER — OMEPRAZOLE 40 MG/1
40 CAPSULE, DELAYED RELEASE ORAL TWICE DAILY
Qty: 28 | Refills: 0 | Status: ACTIVE | COMMUNITY
Start: 2024-11-07 | End: 1900-01-01

## 2024-11-13 ENCOUNTER — TRANSCRIPTION ENCOUNTER (OUTPATIENT)
Age: 44
End: 2024-11-13

## 2024-11-20 ENCOUNTER — RX RENEWAL (OUTPATIENT)
Age: 44
End: 2024-11-20

## 2024-11-20 ENCOUNTER — TRANSCRIPTION ENCOUNTER (OUTPATIENT)
Age: 44
End: 2024-11-20

## 2024-11-21 ENCOUNTER — TRANSCRIPTION ENCOUNTER (OUTPATIENT)
Age: 44
End: 2024-11-21

## 2024-11-22 ENCOUNTER — TRANSCRIPTION ENCOUNTER (OUTPATIENT)
Age: 44
End: 2024-11-22

## 2024-11-25 ENCOUNTER — NON-APPOINTMENT (OUTPATIENT)
Age: 44
End: 2024-11-25

## 2024-11-25 ENCOUNTER — APPOINTMENT (OUTPATIENT)
Dept: NEUROLOGY | Age: 44
End: 2024-11-25
Payer: MEDICAID

## 2024-11-25 VITALS
DIASTOLIC BLOOD PRESSURE: 77 MMHG | SYSTOLIC BLOOD PRESSURE: 121 MMHG | RESPIRATION RATE: 69 BRPM | TEMPERATURE: 97.9 F | OXYGEN SATURATION: 100 % | HEIGHT: 68 IN | HEART RATE: 69 BPM

## 2024-11-25 DIAGNOSIS — G43.719 CHRONIC MIGRAINE W/OUT AURA, INTRACTABLE, W/OUT STATUS MIGRAINOSUS: ICD-10-CM

## 2024-11-25 DIAGNOSIS — G24.4 IDIOPATHIC OROFACIAL DYSTONIA: ICD-10-CM

## 2024-11-25 PROCEDURE — 64612 DESTROY NERVE FACE MUSCLE: CPT | Mod: 50

## 2024-11-25 PROCEDURE — 99213 OFFICE O/P EST LOW 20 MIN: CPT | Mod: 25

## 2024-11-25 RX ORDER — METOCLOPRAMIDE 5 MG/1
5 TABLET ORAL
Qty: 90 | Refills: 0 | Status: ACTIVE | COMMUNITY
Start: 2024-11-25 | End: 1900-01-01

## 2024-11-29 ENCOUNTER — TRANSCRIPTION ENCOUNTER (OUTPATIENT)
Age: 44
End: 2024-11-29

## 2024-12-05 ENCOUNTER — TRANSCRIPTION ENCOUNTER (OUTPATIENT)
Age: 44
End: 2024-12-05

## 2024-12-18 NOTE — PROGRESS NOTE ADULT - SUBJECTIVE AND OBJECTIVE BOX
Stubbed toe on a  blade while getting ready for school. Denies N/T.   Pt seen and examined at bedside. Pt states mild abdominal pain, mostly attributable to gas. Pt is not yet ambulating, tolerating regular diet, not yet passing flatus, and has butler in place.  Pt denies fever, chills, chest pain, SOB, nausea, vomiting, lightheadedness, dizziness.      T(F): 97.7 (01-26-19 @ 14:45), Max: 99.3 (01-26-19 @ 08:45)  HR: 71 (01-26-19 @ 14:45) (54 - 74)  BP: 107/70 (01-26-19 @ 14:45) (100/54 - 110/65)  RR: 16 (01-26-19 @ 14:45) (9 - 25)  SpO2: 99% (01-26-19 @ 14:45) (99% - 100%)  Wt(kg): --  I&O's Summary    25 Jan 2019 07:01  -  26 Jan 2019 07:00  --------------------------------------------------------  IN: 3145 mL / OUT: 2375 mL / NET: 770 mL    26 Jan 2019 07:01  -  26 Jan 2019 15:05  --------------------------------------------------------  IN: 600 mL / OUT: 210 mL / NET: 390 mL        MEDICATIONS  (STANDING):  acetaminophen   Tablet .. 975 milliGRAM(s) Oral every 8 hours  ALPRAZolam 0.5 milliGRAM(s) Oral at bedtime  ketorolac   Injectable 30 milliGRAM(s) IV Push every 6 hours  phenazopyridine 100 milliGRAM(s) Oral every 8 hours  promethazine IVPB 25 milliGRAM(s) IV Intermittent once    MEDICATIONS  (PRN):  metoclopramide Injectable 10 milliGRAM(s) IV Push every 6 hours PRN Nausea and/or Vomiting  ondansetron Injectable 4 milliGRAM(s) IV Push every 4 hours PRN Postoperative Nausea and/or Vomiting  oxyCODONE    IR 5 milliGRAM(s) Oral every 4 hours PRN Moderate Pain (4 - 6)  oxyCODONE    IR 10 milliGRAM(s) Oral every 6 hours PRN Severe Pain (7 - 10)  simethicone 80 milliGRAM(s) Chew two times a day PRN Gas      Physical Exam:  Constitutional: NAD  Pulmonary: clear to auscultation bilaterally   Cardiovascular: Regular rate and rhythm   Abdomen: incision sites clean, dry, intact. Soft, mildly tender, nondistended, no guarding, no rebound, +bowel sounds, righ tovarian suspension in place  Extremities: no lower extremity edema or calve tenderness. SCDs in place     LABS:                        11.9   10.7  )-----------( 269      ( 26 Jan 2019 06:48 )             35.9     01-26    136  |  105  |  14  ----------------------------<  150<H>  4.2   |  23  |  1.12    Ca    8.8      26 Jan 2019 06:48            RADIOLOGY & ADDITIONAL TESTS:

## 2024-12-20 ENCOUNTER — TRANSCRIPTION ENCOUNTER (OUTPATIENT)
Age: 44
End: 2024-12-20

## 2024-12-31 ENCOUNTER — TRANSCRIPTION ENCOUNTER (OUTPATIENT)
Age: 44
End: 2024-12-31

## 2025-01-03 ENCOUNTER — TRANSCRIPTION ENCOUNTER (OUTPATIENT)
Age: 45
End: 2025-01-03

## 2025-01-08 ENCOUNTER — NON-APPOINTMENT (OUTPATIENT)
Age: 45
End: 2025-01-08

## 2025-01-08 ENCOUNTER — TRANSCRIPTION ENCOUNTER (OUTPATIENT)
Age: 45
End: 2025-01-08

## 2025-01-09 ENCOUNTER — RESULT REVIEW (OUTPATIENT)
Age: 45
End: 2025-01-09

## 2025-01-09 ENCOUNTER — APPOINTMENT (OUTPATIENT)
Dept: GASTROENTEROLOGY | Facility: CLINIC | Age: 45
End: 2025-01-09
Payer: MEDICAID

## 2025-01-09 PROCEDURE — 45380 COLONOSCOPY AND BIOPSY: CPT

## 2025-01-23 ENCOUNTER — APPOINTMENT (OUTPATIENT)
Dept: GASTROENTEROLOGY | Facility: CLINIC | Age: 45
End: 2025-01-23
Payer: MEDICAID

## 2025-01-23 ENCOUNTER — TRANSCRIPTION ENCOUNTER (OUTPATIENT)
Age: 45
End: 2025-01-23

## 2025-01-23 DIAGNOSIS — A04.8 OTHER SPECIFIED BACTERIAL INTESTINAL INFECTIONS: ICD-10-CM

## 2025-01-23 DIAGNOSIS — R19.7 DIARRHEA, UNSPECIFIED: ICD-10-CM

## 2025-01-23 PROCEDURE — 99213 OFFICE O/P EST LOW 20 MIN: CPT | Mod: 93

## 2025-02-03 ENCOUNTER — TRANSCRIPTION ENCOUNTER (OUTPATIENT)
Age: 45
End: 2025-02-03

## 2025-02-06 ENCOUNTER — TRANSCRIPTION ENCOUNTER (OUTPATIENT)
Age: 45
End: 2025-02-06

## 2025-02-06 ENCOUNTER — NON-APPOINTMENT (OUTPATIENT)
Age: 45
End: 2025-02-06

## 2025-02-06 DIAGNOSIS — N83.209 UNSPECIFIED OVARIAN CYST, UNSPECIFIED SIDE: ICD-10-CM

## 2025-02-28 ENCOUNTER — NON-APPOINTMENT (OUTPATIENT)
Age: 45
End: 2025-02-28

## 2025-02-28 ENCOUNTER — APPOINTMENT (OUTPATIENT)
Dept: NEUROLOGY | Age: 45
End: 2025-02-28
Payer: MEDICAID

## 2025-02-28 VITALS
RESPIRATION RATE: 17 BRPM | TEMPERATURE: 98.1 F | HEART RATE: 82 BPM | OXYGEN SATURATION: 100 % | DIASTOLIC BLOOD PRESSURE: 65 MMHG | SYSTOLIC BLOOD PRESSURE: 106 MMHG | HEIGHT: 68 IN

## 2025-02-28 DIAGNOSIS — G24.4 IDIOPATHIC OROFACIAL DYSTONIA: ICD-10-CM

## 2025-02-28 DIAGNOSIS — G43.719 CHRONIC MIGRAINE W/OUT AURA, INTRACTABLE, W/OUT STATUS MIGRAINOSUS: ICD-10-CM

## 2025-02-28 PROCEDURE — 99213 OFFICE O/P EST LOW 20 MIN: CPT | Mod: 25

## 2025-02-28 PROCEDURE — 64612 DESTROY NERVE FACE MUSCLE: CPT | Mod: 50

## 2025-02-28 RX ORDER — UBROGEPANT 100 MG/1
100 TABLET ORAL
Qty: 10 | Refills: 11 | Status: ACTIVE | COMMUNITY
Start: 2025-02-28 | End: 1900-01-01

## 2025-03-10 ENCOUNTER — TRANSCRIPTION ENCOUNTER (OUTPATIENT)
Age: 45
End: 2025-03-10

## 2025-03-11 ENCOUNTER — TRANSCRIPTION ENCOUNTER (OUTPATIENT)
Age: 45
End: 2025-03-11

## 2025-03-11 RX ORDER — PROPRANOLOL HYDROCHLORIDE 60 MG/1
60 CAPSULE, EXTENDED RELEASE ORAL
Qty: 90 | Refills: 1 | Status: ACTIVE | COMMUNITY
Start: 2025-03-11 | End: 1900-01-01

## 2025-03-17 ENCOUNTER — NON-APPOINTMENT (OUTPATIENT)
Age: 45
End: 2025-03-17

## 2025-03-17 ENCOUNTER — APPOINTMENT (OUTPATIENT)
Dept: OBGYN | Facility: CLINIC | Age: 45
End: 2025-03-17

## 2025-03-17 ENCOUNTER — LABORATORY RESULT (OUTPATIENT)
Age: 45
End: 2025-03-17

## 2025-03-17 VITALS
OXYGEN SATURATION: 97 % | DIASTOLIC BLOOD PRESSURE: 80 MMHG | WEIGHT: 148 LBS | HEART RATE: 81 BPM | BODY MASS INDEX: 22.5 KG/M2 | SYSTOLIC BLOOD PRESSURE: 110 MMHG

## 2025-03-17 DIAGNOSIS — R30.0 DYSURIA: ICD-10-CM

## 2025-03-17 DIAGNOSIS — G89.29 PELVIC AND PERINEAL PAIN: ICD-10-CM

## 2025-03-17 DIAGNOSIS — R10.2 PELVIC AND PERINEAL PAIN: ICD-10-CM

## 2025-03-17 PROCEDURE — 99203 OFFICE O/P NEW LOW 30 MIN: CPT

## 2025-03-17 PROCEDURE — 99459 PELVIC EXAMINATION: CPT

## 2025-03-18 ENCOUNTER — OUTPATIENT (OUTPATIENT)
Dept: OUTPATIENT SERVICES | Facility: HOSPITAL | Age: 45
LOS: 1 days | End: 2025-03-18

## 2025-03-18 ENCOUNTER — APPOINTMENT (OUTPATIENT)
Dept: ULTRASOUND IMAGING | Facility: HOSPITAL | Age: 45
End: 2025-03-18
Payer: MEDICAID

## 2025-03-18 ENCOUNTER — NON-APPOINTMENT (OUTPATIENT)
Age: 45
End: 2025-03-18

## 2025-03-18 DIAGNOSIS — Z90.5 ACQUIRED ABSENCE OF KIDNEY: Chronic | ICD-10-CM

## 2025-03-18 DIAGNOSIS — Z98.89 OTHER SPECIFIED POSTPROCEDURAL STATES: Chronic | ICD-10-CM

## 2025-03-18 DIAGNOSIS — Z90.49 ACQUIRED ABSENCE OF OTHER SPECIFIED PARTS OF DIGESTIVE TRACT: Chronic | ICD-10-CM

## 2025-03-18 DIAGNOSIS — Z98.890 OTHER SPECIFIED POSTPROCEDURAL STATES: Chronic | ICD-10-CM

## 2025-03-18 DIAGNOSIS — K08.409 PARTIAL LOSS OF TEETH, UNSPECIFIED CAUSE, UNSPECIFIED CLASS: Chronic | ICD-10-CM

## 2025-03-18 DIAGNOSIS — Q63.9 CONGENITAL MALFORMATION OF KIDNEY, UNSPECIFIED: Chronic | ICD-10-CM

## 2025-03-18 PROCEDURE — 76830 TRANSVAGINAL US NON-OB: CPT

## 2025-03-18 PROCEDURE — 76830 TRANSVAGINAL US NON-OB: CPT | Mod: 26

## 2025-03-19 LAB
CYTOLOGY CVX/VAG DOC THIN PREP: NORMAL
HPV HIGH+LOW RISK DNA PNL CVX: NOT DETECTED

## 2025-03-20 ENCOUNTER — APPOINTMENT (OUTPATIENT)
Dept: RHEUMATOLOGY | Facility: CLINIC | Age: 45
End: 2025-03-20

## 2025-03-20 ENCOUNTER — NON-APPOINTMENT (OUTPATIENT)
Age: 45
End: 2025-03-20

## 2025-03-20 ENCOUNTER — EMERGENCY (EMERGENCY)
Facility: HOSPITAL | Age: 45
LOS: 1 days | Discharge: HOME | End: 2025-03-20
Attending: EMERGENCY MEDICINE | Admitting: EMERGENCY MEDICINE
Payer: COMMERCIAL

## 2025-03-20 VITALS
HEART RATE: 87 BPM | WEIGHT: 145.06 LBS | TEMPERATURE: 98 F | RESPIRATION RATE: 16 BRPM | OXYGEN SATURATION: 100 % | DIASTOLIC BLOOD PRESSURE: 81 MMHG | HEIGHT: 68 IN | SYSTOLIC BLOOD PRESSURE: 119 MMHG

## 2025-03-20 VITALS
SYSTOLIC BLOOD PRESSURE: 113 MMHG | TEMPERATURE: 98 F | OXYGEN SATURATION: 100 % | RESPIRATION RATE: 18 BRPM | HEART RATE: 62 BPM | DIASTOLIC BLOOD PRESSURE: 73 MMHG

## 2025-03-20 DIAGNOSIS — R11.0 NAUSEA: ICD-10-CM

## 2025-03-20 DIAGNOSIS — Z87.448 PERSONAL HISTORY OF OTHER DISEASES OF URINARY SYSTEM: ICD-10-CM

## 2025-03-20 DIAGNOSIS — Z98.890 OTHER SPECIFIED POSTPROCEDURAL STATES: Chronic | ICD-10-CM

## 2025-03-20 DIAGNOSIS — Z90.49 ACQUIRED ABSENCE OF OTHER SPECIFIED PARTS OF DIGESTIVE TRACT: Chronic | ICD-10-CM

## 2025-03-20 DIAGNOSIS — R50.9 FEVER, UNSPECIFIED: ICD-10-CM

## 2025-03-20 DIAGNOSIS — Z88.8 ALLERGY STATUS TO OTHER DRUGS, MEDICAMENTS AND BIOLOGICAL SUBSTANCES: ICD-10-CM

## 2025-03-20 DIAGNOSIS — Z90.5 ACQUIRED ABSENCE OF KIDNEY: Chronic | ICD-10-CM

## 2025-03-20 DIAGNOSIS — Z98.890 OTHER SPECIFIED POSTPROCEDURAL STATES: ICD-10-CM

## 2025-03-20 DIAGNOSIS — R35.0 FREQUENCY OF MICTURITION: ICD-10-CM

## 2025-03-20 DIAGNOSIS — Z98.89 OTHER SPECIFIED POSTPROCEDURAL STATES: Chronic | ICD-10-CM

## 2025-03-20 DIAGNOSIS — R30.0 DYSURIA: ICD-10-CM

## 2025-03-20 DIAGNOSIS — Z87.440 PERSONAL HISTORY OF URINARY (TRACT) INFECTIONS: ICD-10-CM

## 2025-03-20 DIAGNOSIS — Z88.5 ALLERGY STATUS TO NARCOTIC AGENT: ICD-10-CM

## 2025-03-20 DIAGNOSIS — Z88.1 ALLERGY STATUS TO OTHER ANTIBIOTIC AGENTS: ICD-10-CM

## 2025-03-20 DIAGNOSIS — Z88.0 ALLERGY STATUS TO PENICILLIN: ICD-10-CM

## 2025-03-20 DIAGNOSIS — K08.409 PARTIAL LOSS OF TEETH, UNSPECIFIED CAUSE, UNSPECIFIED CLASS: Chronic | ICD-10-CM

## 2025-03-20 DIAGNOSIS — Q63.9 CONGENITAL MALFORMATION OF KIDNEY, UNSPECIFIED: Chronic | ICD-10-CM

## 2025-03-20 DIAGNOSIS — Z90.5 ACQUIRED ABSENCE OF KIDNEY: ICD-10-CM

## 2025-03-20 DIAGNOSIS — Z91.048 OTHER NONMEDICINAL SUBSTANCE ALLERGY STATUS: ICD-10-CM

## 2025-03-20 LAB
ANION GAP SERPL CALC-SCNC: 12 MMOL/L — SIGNIFICANT CHANGE UP (ref 5–17)
APPEARANCE UR: CLEAR — SIGNIFICANT CHANGE UP
APPEARANCE: CLEAR
BASOPHILS # BLD AUTO: 0.04 K/UL — SIGNIFICANT CHANGE UP (ref 0–0.2)
BASOPHILS NFR BLD AUTO: 0.6 % — SIGNIFICANT CHANGE UP (ref 0–2)
BILIRUB UR-MCNC: NEGATIVE — SIGNIFICANT CHANGE UP
BILIRUBIN URINE: NEGATIVE
BLOOD URINE: NEGATIVE
BUN SERPL-MCNC: 11 MG/DL — SIGNIFICANT CHANGE UP (ref 7–23)
CALCIUM SERPL-MCNC: 9.4 MG/DL — SIGNIFICANT CHANGE UP (ref 8.4–10.5)
CHLORIDE SERPL-SCNC: 101 MMOL/L — SIGNIFICANT CHANGE UP (ref 96–108)
CO2 SERPL-SCNC: 26 MMOL/L — SIGNIFICANT CHANGE UP (ref 22–31)
COLOR SPEC: SIGNIFICANT CHANGE UP
COLOR: YELLOW
CREAT SERPL-MCNC: 0.97 MG/DL — SIGNIFICANT CHANGE UP (ref 0.5–1.3)
DIFF PNL FLD: NEGATIVE — SIGNIFICANT CHANGE UP
EGFR: 75 ML/MIN/1.73M2 — SIGNIFICANT CHANGE UP
EGFR: 75 ML/MIN/1.73M2 — SIGNIFICANT CHANGE UP
EOSINOPHIL # BLD AUTO: 0.16 K/UL — SIGNIFICANT CHANGE UP (ref 0–0.5)
EOSINOPHIL NFR BLD AUTO: 2.6 % — SIGNIFICANT CHANGE UP (ref 0–6)
GLUCOSE QUALITATIVE U: NEGATIVE MG/DL
GLUCOSE SERPL-MCNC: 71 MG/DL — SIGNIFICANT CHANGE UP (ref 70–99)
GLUCOSE UR QL: NEGATIVE MG/DL — SIGNIFICANT CHANGE UP
HCG UR QL: NEGATIVE — SIGNIFICANT CHANGE UP
HCT VFR BLD CALC: 44.6 % — SIGNIFICANT CHANGE UP (ref 34.5–45)
HGB BLD-MCNC: 14.7 G/DL — SIGNIFICANT CHANGE UP (ref 11.5–15.5)
IMM GRANULOCYTES NFR BLD AUTO: 0.2 % — SIGNIFICANT CHANGE UP (ref 0–0.9)
KETONES UR-MCNC: NEGATIVE MG/DL — SIGNIFICANT CHANGE UP
KETONES URINE: NEGATIVE MG/DL
LEUKOCYTE ESTERASE UR-ACNC: NEGATIVE — SIGNIFICANT CHANGE UP
LEUKOCYTE ESTERASE URINE: ABNORMAL
LYMPHOCYTES # BLD AUTO: 2.23 K/UL — SIGNIFICANT CHANGE UP (ref 1–3.3)
LYMPHOCYTES # BLD AUTO: 35.6 % — SIGNIFICANT CHANGE UP (ref 13–44)
MCHC RBC-ENTMCNC: 27.5 PG — SIGNIFICANT CHANGE UP (ref 27–34)
MCHC RBC-ENTMCNC: 33 G/DL — SIGNIFICANT CHANGE UP (ref 32–36)
MCV RBC AUTO: 83.5 FL — SIGNIFICANT CHANGE UP (ref 80–100)
MONOCYTES # BLD AUTO: 0.6 K/UL — SIGNIFICANT CHANGE UP (ref 0–0.9)
MONOCYTES NFR BLD AUTO: 9.6 % — SIGNIFICANT CHANGE UP (ref 2–14)
NEUTROPHILS # BLD AUTO: 3.23 K/UL — SIGNIFICANT CHANGE UP (ref 1.8–7.4)
NEUTROPHILS NFR BLD AUTO: 51.4 % — SIGNIFICANT CHANGE UP (ref 43–77)
NITRITE UR-MCNC: NEGATIVE — SIGNIFICANT CHANGE UP
NITRITE URINE: POSITIVE
NRBC BLD AUTO-RTO: 0 /100 WBCS — SIGNIFICANT CHANGE UP (ref 0–0)
PH UR: 7.5 — SIGNIFICANT CHANGE UP (ref 5–8)
PH URINE: 7
PLATELET # BLD AUTO: 289 K/UL — SIGNIFICANT CHANGE UP (ref 150–400)
POTASSIUM SERPL-MCNC: 4.3 MMOL/L — SIGNIFICANT CHANGE UP (ref 3.5–5.3)
POTASSIUM SERPL-SCNC: 4.3 MMOL/L — SIGNIFICANT CHANGE UP (ref 3.5–5.3)
PROT UR-MCNC: NEGATIVE MG/DL — SIGNIFICANT CHANGE UP
PROTEIN URINE: NEGATIVE MG/DL
RBC # BLD: 5.34 M/UL — HIGH (ref 3.8–5.2)
RBC # FLD: 12.9 % — SIGNIFICANT CHANGE UP (ref 10.3–14.5)
SODIUM SERPL-SCNC: 139 MMOL/L — SIGNIFICANT CHANGE UP (ref 135–145)
SP GR SPEC: 1.01 — SIGNIFICANT CHANGE UP (ref 1–1.03)
SPECIFIC GRAVITY URINE: 1.01
UROBILINOGEN FLD QL: 0.2 MG/DL — SIGNIFICANT CHANGE UP (ref 0.2–1)
UROBILINOGEN URINE: 0.2 MG/DL
WBC # BLD: 6.27 K/UL — SIGNIFICANT CHANGE UP (ref 3.8–10.5)
WBC # FLD AUTO: 6.27 K/UL — SIGNIFICANT CHANGE UP (ref 3.8–10.5)

## 2025-03-20 PROCEDURE — 85025 COMPLETE CBC W/AUTO DIFF WBC: CPT

## 2025-03-20 PROCEDURE — 81025 URINE PREGNANCY TEST: CPT

## 2025-03-20 PROCEDURE — 80048 BASIC METABOLIC PNL TOTAL CA: CPT

## 2025-03-20 PROCEDURE — 81003 URINALYSIS AUTO W/O SCOPE: CPT

## 2025-03-20 PROCEDURE — 99284 EMERGENCY DEPT VISIT MOD MDM: CPT

## 2025-03-20 PROCEDURE — 36415 COLL VENOUS BLD VENIPUNCTURE: CPT

## 2025-03-20 PROCEDURE — 99284 EMERGENCY DEPT VISIT MOD MDM: CPT | Mod: 25

## 2025-03-20 PROCEDURE — 96374 THER/PROPH/DIAG INJ IV PUSH: CPT

## 2025-03-20 RX ORDER — ACETAMINOPHEN 500 MG/5ML
1000 LIQUID (ML) ORAL ONCE
Refills: 0 | Status: COMPLETED | OUTPATIENT
Start: 2025-03-20 | End: 2025-03-20

## 2025-03-20 RX ADMIN — Medication 1000 MILLILITER(S): at 13:49

## 2025-03-20 RX ADMIN — Medication 400 MILLIGRAM(S): at 13:49

## 2025-03-25 ENCOUNTER — NON-APPOINTMENT (OUTPATIENT)
Age: 45
End: 2025-03-25

## 2025-03-25 RX ORDER — NITROFURANTOIN (MONOHYDRATE/MACROCRYSTALS) 25; 75 MG/1; MG/1
100 CAPSULE ORAL
Qty: 14 | Refills: 6 | Status: ACTIVE | COMMUNITY
Start: 2025-03-25 | End: 1900-01-01

## 2025-04-14 ENCOUNTER — TRANSCRIPTION ENCOUNTER (OUTPATIENT)
Age: 45
End: 2025-04-14

## 2025-04-23 ENCOUNTER — TRANSCRIPTION ENCOUNTER (OUTPATIENT)
Age: 45
End: 2025-04-23

## 2025-04-28 ENCOUNTER — TRANSCRIPTION ENCOUNTER (OUTPATIENT)
Age: 45
End: 2025-04-28

## 2025-05-01 ENCOUNTER — TRANSCRIPTION ENCOUNTER (OUTPATIENT)
Age: 45
End: 2025-05-01

## 2025-05-02 ENCOUNTER — TRANSCRIPTION ENCOUNTER (OUTPATIENT)
Age: 45
End: 2025-05-02

## 2025-05-06 ENCOUNTER — TRANSCRIPTION ENCOUNTER (OUTPATIENT)
Age: 45
End: 2025-05-06

## 2025-05-09 ENCOUNTER — APPOINTMENT (OUTPATIENT)
Dept: RHEUMATOLOGY | Facility: CLINIC | Age: 45
End: 2025-05-09
Payer: MEDICAID

## 2025-05-09 ENCOUNTER — APPOINTMENT (OUTPATIENT)
Dept: ORTHOPEDIC SURGERY | Facility: CLINIC | Age: 45
End: 2025-05-09

## 2025-05-09 VITALS
OXYGEN SATURATION: 100 % | TEMPERATURE: 97.8 F | WEIGHT: 145 LBS | HEART RATE: 68 BPM | BODY MASS INDEX: 21.98 KG/M2 | HEIGHT: 68 IN | SYSTOLIC BLOOD PRESSURE: 112 MMHG | DIASTOLIC BLOOD PRESSURE: 70 MMHG

## 2025-05-09 VITALS — WEIGHT: 148 LBS | RESPIRATION RATE: 18 BRPM | BODY MASS INDEX: 22.43 KG/M2 | HEIGHT: 68 IN

## 2025-05-09 DIAGNOSIS — M25.531 PAIN IN RIGHT WRIST: ICD-10-CM

## 2025-05-09 DIAGNOSIS — R76.8 OTHER SPECIFIED ABNORMAL IMMUNOLOGICAL FINDINGS IN SERUM: ICD-10-CM

## 2025-05-09 PROCEDURE — 99215 OFFICE O/P EST HI 40 MIN: CPT | Mod: 25

## 2025-05-09 PROCEDURE — 99203 OFFICE O/P NEW LOW 30 MIN: CPT

## 2025-05-10 LAB
DSDNA AB SER-ACNC: <1 IU/ML
RHEUMATOID FACT SER QL: <10 IU/ML

## 2025-05-12 LAB
C3 SERPL-MCNC: 106 MG/DL
C4 SERPL-MCNC: 16 MG/DL

## 2025-05-20 ENCOUNTER — TRANSCRIPTION ENCOUNTER (OUTPATIENT)
Age: 45
End: 2025-05-20

## 2025-05-27 ENCOUNTER — NON-APPOINTMENT (OUTPATIENT)
Age: 45
End: 2025-05-27

## 2025-05-27 ENCOUNTER — APPOINTMENT (OUTPATIENT)
Dept: NEUROLOGY | Age: 45
End: 2025-05-27
Payer: MEDICAID

## 2025-05-27 VITALS
SYSTOLIC BLOOD PRESSURE: 104 MMHG | RESPIRATION RATE: 17 BRPM | HEIGHT: 68 IN | DIASTOLIC BLOOD PRESSURE: 61 MMHG | BODY MASS INDEX: 21.98 KG/M2 | WEIGHT: 145 LBS | HEART RATE: 84 BPM | TEMPERATURE: 98.1 F | OXYGEN SATURATION: 98 %

## 2025-05-27 DIAGNOSIS — G24.4 IDIOPATHIC OROFACIAL DYSTONIA: ICD-10-CM

## 2025-05-27 DIAGNOSIS — G43.719 CHRONIC MIGRAINE W/OUT AURA, INTRACTABLE, W/OUT STATUS MIGRAINOSUS: ICD-10-CM

## 2025-05-27 PROCEDURE — 99213 OFFICE O/P EST LOW 20 MIN: CPT | Mod: 25

## 2025-05-27 PROCEDURE — 64612 DESTROY NERVE FACE MUSCLE: CPT | Mod: 50

## 2025-06-04 ENCOUNTER — TRANSCRIPTION ENCOUNTER (OUTPATIENT)
Age: 45
End: 2025-06-04

## 2025-06-05 ENCOUNTER — TRANSCRIPTION ENCOUNTER (OUTPATIENT)
Age: 45
End: 2025-06-05

## 2025-06-13 ENCOUNTER — NON-APPOINTMENT (OUTPATIENT)
Age: 45
End: 2025-06-13

## 2025-06-17 ENCOUNTER — APPOINTMENT (OUTPATIENT)
Dept: OBGYN | Facility: CLINIC | Age: 45
End: 2025-06-17

## 2025-06-17 VITALS
WEIGHT: 150 LBS | SYSTOLIC BLOOD PRESSURE: 100 MMHG | OXYGEN SATURATION: 97 % | DIASTOLIC BLOOD PRESSURE: 70 MMHG | HEART RATE: 83 BPM | BODY MASS INDEX: 22.81 KG/M2

## 2025-06-17 PROBLEM — N39.0 CHRONIC UTI: Status: ACTIVE | Noted: 2025-06-17

## 2025-06-17 PROCEDURE — 99214 OFFICE O/P EST MOD 30 MIN: CPT

## 2025-07-11 ENCOUNTER — TRANSCRIPTION ENCOUNTER (OUTPATIENT)
Age: 45
End: 2025-07-11

## 2025-07-19 ENCOUNTER — APPOINTMENT (OUTPATIENT)
Dept: MRI IMAGING | Facility: CLINIC | Age: 45
End: 2025-07-19

## 2025-07-22 ENCOUNTER — APPOINTMENT (OUTPATIENT)
Dept: OBGYN | Facility: CLINIC | Age: 45
End: 2025-07-22
Payer: MEDICAID

## 2025-07-22 VITALS — SYSTOLIC BLOOD PRESSURE: 110 MMHG | HEART RATE: 84 BPM | OXYGEN SATURATION: 96 % | DIASTOLIC BLOOD PRESSURE: 70 MMHG

## 2025-07-22 DIAGNOSIS — R10.9 UNSPECIFIED ABDOMINAL PAIN: ICD-10-CM

## 2025-07-22 PROCEDURE — 99213 OFFICE O/P EST LOW 20 MIN: CPT | Mod: GC

## 2025-08-04 ENCOUNTER — TRANSCRIPTION ENCOUNTER (OUTPATIENT)
Age: 45
End: 2025-08-04

## 2025-08-13 ENCOUNTER — APPOINTMENT (OUTPATIENT)
Dept: OBGYN | Facility: CLINIC | Age: 45
End: 2025-08-13

## 2025-08-13 VITALS
SYSTOLIC BLOOD PRESSURE: 140 MMHG | BODY MASS INDEX: 22.79 KG/M2 | DIASTOLIC BLOOD PRESSURE: 86 MMHG | WEIGHT: 149.91 LBS

## 2025-08-13 DIAGNOSIS — N93.9 ABNORMAL UTERINE AND VAGINAL BLEEDING, UNSPECIFIED: ICD-10-CM

## 2025-08-13 DIAGNOSIS — N80.9 ENDOMETRIOSIS, UNSPECIFIED: ICD-10-CM

## 2025-08-13 DIAGNOSIS — N94.9 UNSPECIFIED CONDITION ASSOCIATED WITH FEMALE GENITAL ORGANS AND MENSTRUAL CYCLE: ICD-10-CM

## 2025-08-13 PROCEDURE — 99205 OFFICE O/P NEW HI 60 MIN: CPT | Mod: GC

## 2025-08-21 ENCOUNTER — TRANSCRIPTION ENCOUNTER (OUTPATIENT)
Age: 45
End: 2025-08-21

## 2025-08-25 ENCOUNTER — TRANSCRIPTION ENCOUNTER (OUTPATIENT)
Age: 45
End: 2025-08-25

## 2025-09-02 ENCOUNTER — TRANSCRIPTION ENCOUNTER (OUTPATIENT)
Age: 45
End: 2025-09-02

## 2025-09-02 ENCOUNTER — APPOINTMENT (OUTPATIENT)
Dept: NEUROLOGY | Age: 45
End: 2025-09-02
Payer: MEDICAID

## 2025-09-02 ENCOUNTER — NON-APPOINTMENT (OUTPATIENT)
Age: 45
End: 2025-09-02

## 2025-09-02 VITALS
RESPIRATION RATE: 17 BRPM | OXYGEN SATURATION: 99 % | SYSTOLIC BLOOD PRESSURE: 98 MMHG | BODY MASS INDEX: 22.58 KG/M2 | HEIGHT: 68 IN | WEIGHT: 149 LBS | TEMPERATURE: 98 F | DIASTOLIC BLOOD PRESSURE: 65 MMHG | HEART RATE: 94 BPM

## 2025-09-02 DIAGNOSIS — G43.719 CHRONIC MIGRAINE W/OUT AURA, INTRACTABLE, W/OUT STATUS MIGRAINOSUS: ICD-10-CM

## 2025-09-02 DIAGNOSIS — G24.4 IDIOPATHIC OROFACIAL DYSTONIA: ICD-10-CM

## 2025-09-02 PROCEDURE — 99213 OFFICE O/P EST LOW 20 MIN: CPT | Mod: 25

## 2025-09-02 PROCEDURE — 64612 DESTROY NERVE FACE MUSCLE: CPT | Mod: 50

## 2025-09-03 ENCOUNTER — NON-APPOINTMENT (OUTPATIENT)
Age: 45
End: 2025-09-03

## 2025-09-03 ENCOUNTER — APPOINTMENT (OUTPATIENT)
Dept: UROLOGY | Facility: CLINIC | Age: 45
End: 2025-09-03
Payer: MEDICAID

## 2025-09-03 VITALS
SYSTOLIC BLOOD PRESSURE: 98 MMHG | DIASTOLIC BLOOD PRESSURE: 74 MMHG | HEART RATE: 100 BPM | TEMPERATURE: 97.2 F | OXYGEN SATURATION: 100 %

## 2025-09-03 DIAGNOSIS — R39.9 UNSPECIFIED SYMPTOMS AND SIGNS INVOLVING THE GENITOURINARY SYSTEM: ICD-10-CM

## 2025-09-03 PROCEDURE — G2211 COMPLEX E/M VISIT ADD ON: CPT | Mod: NC

## 2025-09-03 PROCEDURE — 99205 OFFICE O/P NEW HI 60 MIN: CPT

## 2025-09-03 RX ORDER — NITROFURANTOIN MACROCRYSTALS 50 MG/1
50 CAPSULE ORAL
Qty: 50 | Refills: 3 | Status: ACTIVE | COMMUNITY
Start: 2025-09-03 | End: 1900-01-01

## 2025-09-04 LAB
APPEARANCE: CLEAR
BACTERIA: ABNORMAL /HPF
BILIRUB UR QL STRIP: NORMAL
BILIRUBIN URINE: NEGATIVE
BLOOD URINE: NEGATIVE
CAST: 1 /LPF
CLARITY UR: CLEAR
COLLECTION METHOD: NORMAL
COLOR: YELLOW
EPITHELIAL CELLS: 14 /HPF
GLUCOSE QUALITATIVE U: NEGATIVE MG/DL
GLUCOSE UR-MCNC: NORMAL
HCG UR QL: 0.2 EU/DL
HGB UR QL STRIP.AUTO: NORMAL
KETONES UR-MCNC: NORMAL
KETONES URINE: NEGATIVE MG/DL
LEUKOCYTE ESTERASE UR QL STRIP: NORMAL
LEUKOCYTE ESTERASE URINE: NEGATIVE
MICROSCOPIC-UA: NORMAL
NITRITE UR QL STRIP: NORMAL
NITRITE URINE: NEGATIVE
PH UR STRIP: 7.5
PH URINE: 8
PROT UR STRIP-MCNC: NORMAL
PROTEIN URINE: NEGATIVE MG/DL
RED BLOOD CELLS URINE: 4 /HPF
SP GR UR STRIP: 1.01
SPECIFIC GRAVITY URINE: 1.01
UROBILINOGEN URINE: 0.2 MG/DL
WHITE BLOOD CELLS URINE: 0 /HPF

## 2025-09-05 ENCOUNTER — NON-APPOINTMENT (OUTPATIENT)
Age: 45
End: 2025-09-05

## 2025-09-08 LAB — BACTERIA UR CULT: NORMAL

## 2025-09-09 ENCOUNTER — TRANSCRIPTION ENCOUNTER (OUTPATIENT)
Age: 45
End: 2025-09-09

## 2025-09-09 RX ORDER — EPTINEZUMAB-JJMR 100 MG/ML
100 INJECTION INTRAVENOUS
Qty: 1 | Refills: 3 | Status: ACTIVE | OUTPATIENT
Start: 2025-09-09

## 2025-09-10 ENCOUNTER — APPOINTMENT (OUTPATIENT)
Dept: UROLOGY | Facility: CLINIC | Age: 45
End: 2025-09-10
Payer: MEDICAID

## 2025-09-10 DIAGNOSIS — N39.0 URINARY TRACT INFECTION, SITE NOT SPECIFIED: ICD-10-CM

## 2025-09-10 PROCEDURE — 51701 INSERT BLADDER CATHETER: CPT

## 2025-09-11 ENCOUNTER — NON-APPOINTMENT (OUTPATIENT)
Age: 45
End: 2025-09-11

## 2025-09-11 LAB
APPEARANCE: CLEAR
BACTERIA: NEGATIVE /HPF
BILIRUBIN URINE: NEGATIVE
BLOOD URINE: NEGATIVE
CAST: 0 /LPF
COLOR: YELLOW
EPITHELIAL CELLS: 2 /HPF
GLUCOSE QUALITATIVE U: NEGATIVE MG/DL
KETONES URINE: NEGATIVE MG/DL
LEUKOCYTE ESTERASE URINE: NEGATIVE
MICROSCOPIC-UA: NORMAL
NITRITE URINE: NEGATIVE
PH URINE: 6
PROTEIN URINE: NEGATIVE MG/DL
RED BLOOD CELLS URINE: 1 /HPF
SPECIFIC GRAVITY URINE: 1.01
UROBILINOGEN URINE: 0.2 MG/DL
WHITE BLOOD CELLS URINE: 0 /HPF

## 2025-09-16 ENCOUNTER — TRANSCRIPTION ENCOUNTER (OUTPATIENT)
Age: 45
End: 2025-09-16

## 2025-09-17 ENCOUNTER — TRANSCRIPTION ENCOUNTER (OUTPATIENT)
Age: 45
End: 2025-09-17

## (undated) DEVICE — SHAVER BLADE S&N INCISOR PLUS ELITE 4.5MM CURVED (SLATE)

## (undated) DEVICE — STRYKER PIVOT SLINGSHOT 70 DEGREE UP

## (undated) DEVICE — SHAVER BLADE S&N FULL RADIUS BONE CUTTER 4.5MM (CLEAR)

## (undated) DEVICE — DRAPE LIGHT HANDLE COVER (BLUE)

## (undated) DEVICE — CANNULA PIVOT MEDICAL TRANSPORT LENGTH 7-8-9 8MM

## (undated) DEVICE — DRAPE SHOWER CURTAIN ISOLATION

## (undated) DEVICE — S&N HIP SPINALS

## (undated) DEVICE — SUT VICRYL 0 36" CT-1 UNDYED

## (undated) DEVICE — DRSG WEBRIL 6"

## (undated) DEVICE — PROBE TAC-S EFFLEX CBL

## (undated) DEVICE — DRILL BIT PIVOT NANOTACK FLEX 1.4MM

## (undated) DEVICE — MARKING PEN W RULER

## (undated) DEVICE — SOL IRR BAG NS 0.9% 3000ML

## (undated) DEVICE — PROBE ABLATOR EFLEX  CBL

## (undated) DEVICE — VENODYNE/SCD SLEEVE CALF MEDIUM

## (undated) DEVICE — CANNULA S&N CLEAR-TRAC HIP 7 X 110MM

## (undated) DEVICE — GLV 8.5 PROTEXIS (WHITE)

## (undated) DEVICE — SHAVER BLADE S&N FULL RADIUS 4.5MM LONG STRAIGHT (YELLOW)

## (undated) DEVICE — SUT PASSER ARTHROCARE ACCU-PASS DIRECT CRESCENT XL

## (undated) DEVICE — BUR S&N ABRADER LONG 5.5MM (BLACK)

## (undated) DEVICE — SUT VICRYL 2-0 27" CT-1 UNDYED

## (undated) DEVICE — PORTAL ENTRY KIT

## (undated) DEVICE — S&N WAND AMBIENT HIPVAC 50 DEGREE IFS 4.6MM

## (undated) DEVICE — DRAPE C ARM 41X74"

## (undated) DEVICE — CANNULA PIVOT MEDICAL FLOWPORT II KIT W OBTURATOR

## (undated) DEVICE — SHAVER BLADE S&N INCISOR PLUS ELITE 4.5MM LONG (SLATE)

## (undated) DEVICE — CUFF BP DURA-CUFF AD LG

## (undated) DEVICE — GLV 8 PROTEXIS (WHITE)

## (undated) DEVICE — PROBE LIGAMENT EFLEX CHISEL CBL

## (undated) DEVICE — STRYKER PIVOT NANOPASS REACH CRESCENT

## (undated) DEVICE — BLADE STRYKER PIVOT SAMURAI FULL RADIUS

## (undated) DEVICE — WARMING BLANKET UPPER ADULT

## (undated) DEVICE — STRYKER PIVOT SLINGSHOT 45 DEGREE UP

## (undated) DEVICE — CANNULA PIVOT MEDICAL TRANSPORT LENGTH 4-5-6-8MM

## (undated) DEVICE — BUR S&N ABRADER LONG 4MM (PURPLE)

## (undated) DEVICE — SHAVER BLADE S&N FULL RADIUS 4.5MM LONG CURVED (YELLOW)

## (undated) DEVICE — SUT ETHILON 3-0 18" PS-1

## (undated) DEVICE — PACK BASIC GOWN MAYO COVER

## (undated) DEVICE — DRAPE C ARM C-ARMOUR

## (undated) DEVICE — TUBING S&N DYONICS 25 INFLOW